# Patient Record
Sex: MALE | Race: WHITE | Employment: OTHER | URBAN - METROPOLITAN AREA
[De-identification: names, ages, dates, MRNs, and addresses within clinical notes are randomized per-mention and may not be internally consistent; named-entity substitution may affect disease eponyms.]

---

## 2020-01-01 ENCOUNTER — HOME CARE VISIT (OUTPATIENT)
Dept: HOSPICE | Facility: HOSPICE | Age: 80
End: 2020-01-01
Payer: MEDICARE

## 2020-01-01 ENCOUNTER — APPOINTMENT (OUTPATIENT)
Dept: GENERAL RADIOLOGY | Age: 80
DRG: 177 | End: 2020-01-01
Attending: HOSPITALIST
Payer: MEDICARE

## 2020-01-01 ENCOUNTER — HOSPICE ADMISSION (OUTPATIENT)
Dept: HOSPICE | Facility: HOSPICE | Age: 80
End: 2020-01-01
Payer: MEDICARE

## 2020-01-01 ENCOUNTER — APPOINTMENT (OUTPATIENT)
Dept: GENERAL RADIOLOGY | Age: 80
DRG: 177 | End: 2020-01-01
Attending: FAMILY MEDICINE
Payer: MEDICARE

## 2020-01-01 ENCOUNTER — APPOINTMENT (OUTPATIENT)
Dept: GENERAL RADIOLOGY | Age: 80
DRG: 177 | End: 2020-01-01
Attending: INTERNAL MEDICINE
Payer: MEDICARE

## 2020-01-01 ENCOUNTER — APPOINTMENT (OUTPATIENT)
Dept: NON INVASIVE DIAGNOSTICS | Age: 80
DRG: 177 | End: 2020-01-01
Attending: FAMILY MEDICINE
Payer: MEDICARE

## 2020-01-01 ENCOUNTER — APPOINTMENT (OUTPATIENT)
Dept: CT IMAGING | Age: 80
DRG: 177 | End: 2020-01-01
Attending: EMERGENCY MEDICINE
Payer: MEDICARE

## 2020-01-01 ENCOUNTER — HOME CARE VISIT (OUTPATIENT)
Dept: SCHEDULING | Facility: HOME HEALTH | Age: 80
End: 2020-01-01
Payer: MEDICARE

## 2020-01-01 ENCOUNTER — HOSPITAL ENCOUNTER (INPATIENT)
Dept: CT IMAGING | Age: 80
Discharge: HOME | DRG: 177 | End: 2020-09-30
Attending: INTERNAL MEDICINE
Payer: MEDICARE

## 2020-01-01 ENCOUNTER — HOSPITAL ENCOUNTER (INPATIENT)
Age: 80
LOS: 10 days | Discharge: HOSPICE | DRG: 177 | End: 2020-10-09
Attending: EMERGENCY MEDICINE | Admitting: FAMILY MEDICINE
Payer: MEDICARE

## 2020-01-01 ENCOUNTER — APPOINTMENT (OUTPATIENT)
Dept: GENERAL RADIOLOGY | Age: 80
DRG: 177 | End: 2020-01-01
Attending: EMERGENCY MEDICINE
Payer: MEDICARE

## 2020-01-01 VITALS
SYSTOLIC BLOOD PRESSURE: 98 MMHG | BODY MASS INDEX: 24.23 KG/M2 | TEMPERATURE: 97.9 F | HEART RATE: 83 BPM | WEIGHT: 159.9 LBS | DIASTOLIC BLOOD PRESSURE: 42 MMHG | HEIGHT: 68 IN | RESPIRATION RATE: 20 BRPM | OXYGEN SATURATION: 97 %

## 2020-01-01 VITALS
DIASTOLIC BLOOD PRESSURE: 77 MMHG | HEART RATE: 92 BPM | TEMPERATURE: 97.2 F | OXYGEN SATURATION: 98 % | SYSTOLIC BLOOD PRESSURE: 133 MMHG | RESPIRATION RATE: 20 BRPM

## 2020-01-01 VITALS
DIASTOLIC BLOOD PRESSURE: 80 MMHG | OXYGEN SATURATION: 95 % | WEIGHT: 153 LBS | RESPIRATION RATE: 20 BRPM | BODY MASS INDEX: 23.19 KG/M2 | TEMPERATURE: 97.7 F | HEART RATE: 114 BPM | HEIGHT: 68 IN | SYSTOLIC BLOOD PRESSURE: 150 MMHG

## 2020-01-01 VITALS
DIASTOLIC BLOOD PRESSURE: 87 MMHG | RESPIRATION RATE: 30 BRPM | SYSTOLIC BLOOD PRESSURE: 139 MMHG | TEMPERATURE: 99.8 F | HEART RATE: 93 BPM | OXYGEN SATURATION: 98 %

## 2020-01-01 VITALS
TEMPERATURE: 97 F | RESPIRATION RATE: 22 BRPM | SYSTOLIC BLOOD PRESSURE: 128 MMHG | DIASTOLIC BLOOD PRESSURE: 78 MMHG | OXYGEN SATURATION: 100 % | HEART RATE: 90 BPM

## 2020-01-01 DIAGNOSIS — F03.91 DEMENTIA WITH BEHAVIORAL DISTURBANCE, UNSPECIFIED DEMENTIA TYPE: ICD-10-CM

## 2020-01-01 DIAGNOSIS — J96.01 ACUTE RESPIRATORY FAILURE WITH HYPOXIA AND HYPERCAPNIA (HCC): ICD-10-CM

## 2020-01-01 DIAGNOSIS — Z20.822 SUSPECTED 2019 NOVEL CORONAVIRUS INFECTION: ICD-10-CM

## 2020-01-01 DIAGNOSIS — G93.40 ENCEPHALOPATHY ACUTE: ICD-10-CM

## 2020-01-01 DIAGNOSIS — J96.02 ACUTE RESPIRATORY FAILURE WITH HYPOXIA AND HYPERCAPNIA (HCC): ICD-10-CM

## 2020-01-01 DIAGNOSIS — Z71.89 ADVANCED CARE PLANNING/COUNSELING DISCUSSION: ICD-10-CM

## 2020-01-01 DIAGNOSIS — Z77.110 EXACERBATION OF COPD ASSOCIATED WITH VOLCANIC SMOG EXPOSURE (HCC): ICD-10-CM

## 2020-01-01 DIAGNOSIS — J44.1 EXACERBATION OF COPD ASSOCIATED WITH VOLCANIC SMOG EXPOSURE (HCC): ICD-10-CM

## 2020-01-01 DIAGNOSIS — R06.02 SOB (SHORTNESS OF BREATH): ICD-10-CM

## 2020-01-01 DIAGNOSIS — R09.02 HYPOXIA: ICD-10-CM

## 2020-01-01 DIAGNOSIS — J44.1 CHRONIC OBSTRUCTIVE PULMONARY DISEASE WITH ACUTE EXACERBATION (HCC): ICD-10-CM

## 2020-01-01 DIAGNOSIS — F10.10 ALCOHOL ABUSE: ICD-10-CM

## 2020-01-01 DIAGNOSIS — Z99.81 SUPPLEMENTAL OXYGEN DEPENDENT: ICD-10-CM

## 2020-01-01 DIAGNOSIS — F17.200 TOBACCO DEPENDENCE: ICD-10-CM

## 2020-01-01 DIAGNOSIS — R06.03 RESPIRATORY DISTRESS: Primary | ICD-10-CM

## 2020-01-01 LAB
ALBUMIN SERPL-MCNC: 3.5 G/DL (ref 3.4–5)
ALBUMIN SERPL-MCNC: 4.2 G/DL (ref 3.4–5)
ALBUMIN SERPL-MCNC: 4.9 G/DL (ref 3.4–5)
ALBUMIN SERPL-MCNC: 5 G/DL (ref 3.4–5)
ALBUMIN/GLOB SERPL: 0.9 {RATIO} (ref 0.8–1.7)
ALBUMIN/GLOB SERPL: 1.2 {RATIO} (ref 0.8–1.7)
ALBUMIN/GLOB SERPL: 1.6 {RATIO} (ref 0.8–1.7)
ALBUMIN/GLOB SERPL: 1.9 {RATIO} (ref 0.8–1.7)
ALP SERPL-CCNC: 113 U/L (ref 45–117)
ALP SERPL-CCNC: 60 U/L (ref 45–117)
ALP SERPL-CCNC: 71 U/L (ref 45–117)
ALP SERPL-CCNC: 85 U/L (ref 45–117)
ALT SERPL-CCNC: 14 U/L (ref 16–61)
ALT SERPL-CCNC: 16 U/L (ref 16–61)
ALT SERPL-CCNC: 17 U/L (ref 16–61)
ALT SERPL-CCNC: 19 U/L (ref 16–61)
AMPHET UR QL SCN: NEGATIVE
ANION GAP SERPL CALC-SCNC: 5 MMOL/L (ref 3–18)
ANION GAP SERPL CALC-SCNC: 7 MMOL/L (ref 3–18)
ANION GAP SERPL CALC-SCNC: 8 MMOL/L (ref 3–18)
ANION GAP SERPL CALC-SCNC: 9 MMOL/L (ref 3–18)
ANION GAP SERPL CALC-SCNC: 9 MMOL/L (ref 3–18)
APPEARANCE UR: CLEAR
APTT PPP: 33 SEC (ref 23–36.4)
ARTERIAL PATENCY WRIST A: ABNORMAL
ARTERIAL PATENCY WRIST A: YES
ARTERIAL PATENCY WRIST A: YES
AST SERPL-CCNC: 12 U/L (ref 10–38)
AST SERPL-CCNC: 14 U/L (ref 10–38)
AST SERPL-CCNC: 38 U/L (ref 10–38)
AST SERPL-CCNC: 44 U/L (ref 10–38)
ATRIAL RATE: 93 BPM
AV VELOCITY RATIO: 0.55
AV VTI RATIO: 0.6
BACTERIA SPEC CULT: NORMAL
BACTERIA URNS QL MICRO: 0 /HPF
BARBITURATES UR QL SCN: NEGATIVE
BASE EXCESS BLD CALC-SCNC: 1 MMOL/L
BASE EXCESS BLD CALC-SCNC: 1 MMOL/L
BASE EXCESS BLD CALC-SCNC: 6 MMOL/L
BASOPHILS # BLD: 0 K/UL (ref 0–0.1)
BASOPHILS NFR BLD: 0 % (ref 0–2)
BDY SITE: ABNORMAL
BENZODIAZ UR QL: NEGATIVE
BILIRUB SERPL-MCNC: 0.3 MG/DL (ref 0.2–1)
BILIRUB SERPL-MCNC: 0.5 MG/DL (ref 0.2–1)
BILIRUB SERPL-MCNC: 0.5 MG/DL (ref 0.2–1)
BILIRUB SERPL-MCNC: 0.8 MG/DL (ref 0.2–1)
BILIRUB UR QL: NEGATIVE
BNP SERPL-MCNC: 1571 PG/ML (ref 0–1800)
BODY TEMPERATURE: 98.1
BODY TEMPERATURE: 98.6
BUN SERPL-MCNC: 15 MG/DL (ref 7–18)
BUN SERPL-MCNC: 21 MG/DL (ref 7–18)
BUN SERPL-MCNC: 21 MG/DL (ref 7–18)
BUN SERPL-MCNC: 31 MG/DL (ref 7–18)
BUN SERPL-MCNC: 35 MG/DL (ref 7–18)
BUN SERPL-MCNC: 35 MG/DL (ref 7–18)
BUN SERPL-MCNC: 36 MG/DL (ref 7–18)
BUN SERPL-MCNC: 36 MG/DL (ref 7–18)
BUN SERPL-MCNC: 41 MG/DL (ref 7–18)
BUN/CREAT SERPL: 21 (ref 12–20)
BUN/CREAT SERPL: 25 (ref 12–20)
BUN/CREAT SERPL: 32 (ref 12–20)
BUN/CREAT SERPL: 37 (ref 12–20)
BUN/CREAT SERPL: 42 (ref 12–20)
BUN/CREAT SERPL: 42 (ref 12–20)
BUN/CREAT SERPL: 44 (ref 12–20)
BUN/CREAT SERPL: 45 (ref 12–20)
BUN/CREAT SERPL: 45 (ref 12–20)
CALCIUM SERPL-MCNC: 8.7 MG/DL (ref 8.5–10.1)
CALCIUM SERPL-MCNC: 8.8 MG/DL (ref 8.5–10.1)
CALCIUM SERPL-MCNC: 8.8 MG/DL (ref 8.5–10.1)
CALCIUM SERPL-MCNC: 8.9 MG/DL (ref 8.5–10.1)
CALCIUM SERPL-MCNC: 9.1 MG/DL (ref 8.5–10.1)
CALCIUM SERPL-MCNC: 9.1 MG/DL (ref 8.5–10.1)
CALCIUM SERPL-MCNC: 9.2 MG/DL (ref 8.5–10.1)
CALCIUM SERPL-MCNC: 9.4 MG/DL (ref 8.5–10.1)
CALCIUM SERPL-MCNC: 9.5 MG/DL (ref 8.5–10.1)
CALCULATED P AXIS, ECG09: 50 DEGREES
CALCULATED R AXIS, ECG10: -4 DEGREES
CALCULATED T AXIS, ECG11: 53 DEGREES
CANNABINOIDS UR QL SCN: NEGATIVE
CHLORIDE SERPL-SCNC: 105 MMOL/L (ref 100–111)
CHLORIDE SERPL-SCNC: 105 MMOL/L (ref 100–111)
CHLORIDE SERPL-SCNC: 107 MMOL/L (ref 100–111)
CHLORIDE SERPL-SCNC: 108 MMOL/L (ref 100–111)
CHLORIDE SERPL-SCNC: 108 MMOL/L (ref 100–111)
CHLORIDE SERPL-SCNC: 111 MMOL/L (ref 100–111)
CHLORIDE SERPL-SCNC: 116 MMOL/L (ref 100–111)
CHLORIDE SERPL-SCNC: 120 MMOL/L (ref 100–111)
CHLORIDE SERPL-SCNC: 121 MMOL/L (ref 100–111)
CK MB CFR SERPL CALC: 2.3 % (ref 0–4)
CK MB CFR SERPL CALC: 4.1 % (ref 0–4)
CK MB SERPL-MCNC: 2.6 NG/ML (ref 5–25)
CK MB SERPL-MCNC: 2.9 NG/ML (ref 5–25)
CK SERPL-CCNC: 112 U/L (ref 39–308)
CK SERPL-CCNC: 71 U/L (ref 39–308)
CO2 SERPL-SCNC: 27 MMOL/L (ref 21–32)
CO2 SERPL-SCNC: 28 MMOL/L (ref 21–32)
CO2 SERPL-SCNC: 28 MMOL/L (ref 21–32)
CO2 SERPL-SCNC: 29 MMOL/L (ref 21–32)
CO2 SERPL-SCNC: 30 MMOL/L (ref 21–32)
CO2 SERPL-SCNC: 30 MMOL/L (ref 21–32)
CO2 SERPL-SCNC: 31 MMOL/L (ref 21–32)
COCAINE UR QL SCN: NEGATIVE
COLOR UR: YELLOW
COVID-19 RAPID TEST, COVR: NOT DETECTED
CREAT SERPL-MCNC: 0.65 MG/DL (ref 0.6–1.3)
CREAT SERPL-MCNC: 0.72 MG/DL (ref 0.6–1.3)
CREAT SERPL-MCNC: 0.74 MG/DL (ref 0.6–1.3)
CREAT SERPL-MCNC: 0.78 MG/DL (ref 0.6–1.3)
CREAT SERPL-MCNC: 0.82 MG/DL (ref 0.6–1.3)
CREAT SERPL-MCNC: 0.83 MG/DL (ref 0.6–1.3)
CREAT SERPL-MCNC: 0.83 MG/DL (ref 0.6–1.3)
CREAT SERPL-MCNC: 0.91 MG/DL (ref 0.6–1.3)
CREAT SERPL-MCNC: 0.98 MG/DL (ref 0.6–1.3)
DIAGNOSIS, 93000: NORMAL
DIFFERENTIAL METHOD BLD: ABNORMAL
ECHO AO ASC DIAM: 0 CM
ECHO AV ANNULUS DIAM: 3.31 CM
ECHO AV AREA PEAK VELOCITY: 1.7 CM2
ECHO AV AREA VTI: 1.9 CM2
ECHO AV AREA/BSA PEAK VELOCITY: 0.9 CM2/M2
ECHO AV AREA/BSA VTI: 1 CM2/M2
ECHO AV MEAN GRADIENT: 3.8 MMHG
ECHO AV MEAN VELOCITY: 0.88 M/S
ECHO AV PEAK GRADIENT: 8.3 MMHG
ECHO AV PEAK VELOCITY: 143.69 CM/S
ECHO AV VTI: 22.74 CM
ECHO IVC PROX: 1.73 CM
ECHO LA AREA 4C: 13.1 CM2
ECHO LA MAJOR AXIS: 2.81 CM
ECHO LA MINOR AXIS: 1.46 CM
ECHO LA VOL 4C: 25.73 ML (ref 18–58)
ECHO LA VOLUME INDEX A4C: 13.39 ML/M2 (ref 16–28)
ECHO LV E' LATERAL VELOCITY: 9 CM/S
ECHO LV E' SEPTAL VELOCITY: 4 CM/S
ECHO LV EDV A4C: 106.7 ML
ECHO LV EDV INDEX A4C: 55.5 ML/M2
ECHO LV EDV TEICHHOLZ: 0.59 ML
ECHO LV EJECTION FRACTION A4C: 48 %
ECHO LV ESV A4C: 55.9 ML
ECHO LV ESV INDEX A4C: 29.1 ML/M2
ECHO LV ESV TEICHHOLZ: 0.31 ML
ECHO LV INTERNAL DIMENSION DIASTOLIC: 4.69 CM (ref 4.2–5.9)
ECHO LV INTERNAL DIMENSION SYSTOLIC: 3.59 CM
ECHO LV IVSD: 0.9 CM (ref 0.6–1)
ECHO LV MASS 2D: 142.2 G (ref 88–224)
ECHO LV MASS INDEX 2D: 74 G/M2 (ref 49–115)
ECHO LV POSTERIOR WALL DIASTOLIC: 0.9 CM (ref 0.6–1)
ECHO LV POSTERIOR WALL SYSTOLIC: 0 CM
ECHO LVOT CARDIAC OUTPUT: 4.2 L/MIN
ECHO LVOT DIAM: 2.01 CM
ECHO LVOT PEAK GRADIENT: 2.5 MMHG
ECHO LVOT PEAK VELOCITY: 79.04 CM/S
ECHO LVOT SV: 42.4 ML
ECHO LVOT VTI: 13.43 CM
ECHO MV A VELOCITY: 125 CM/S
ECHO MV E VELOCITY: 78 CM/S
ECHO MV E/A RATIO: 0.62
ECHO MV E/E' LATERAL: 8.67
ECHO MV E/E' RATIO (AVERAGED): 14.08
ECHO MV E/E' SEPTAL: 19.5
ECHO RV TAPSE: 1.87 CM (ref 1.5–2)
ECHO RVOT DIAMETER: 0 CM
EOSINOPHIL # BLD: 0 K/UL (ref 0–0.4)
EOSINOPHIL NFR BLD: 0 % (ref 0–5)
EPITH CASTS URNS QL MICRO: 0 /LPF (ref 0–5)
ERYTHROCYTE [DISTWIDTH] IN BLOOD BY AUTOMATED COUNT: 14.3 % (ref 11.6–14.5)
ERYTHROCYTE [DISTWIDTH] IN BLOOD BY AUTOMATED COUNT: 14.4 % (ref 11.6–14.5)
ERYTHROCYTE [DISTWIDTH] IN BLOOD BY AUTOMATED COUNT: 14.4 % (ref 11.6–14.5)
ERYTHROCYTE [DISTWIDTH] IN BLOOD BY AUTOMATED COUNT: 14.5 % (ref 11.6–14.5)
ERYTHROCYTE [DISTWIDTH] IN BLOOD BY AUTOMATED COUNT: 14.6 % (ref 11.6–14.5)
EST. AVERAGE GLUCOSE BLD GHB EST-MCNC: 151 MG/DL
GAS FLOW.O2 O2 DELIVERY SYS: ABNORMAL L/MIN
GAS FLOW.O2 SETTING OXYMISER: 14 L/M
GAS FLOW.O2 SETTING OXYMISER: 2 L/M
GAS FLOW.O2 SETTING OXYMISER: 5 L/M
GLOBULIN SER CALC-MCNC: 2.7 G/DL (ref 2–4)
GLOBULIN SER CALC-MCNC: 3 G/DL (ref 2–4)
GLOBULIN SER CALC-MCNC: 3.4 G/DL (ref 2–4)
GLOBULIN SER CALC-MCNC: 4.1 G/DL (ref 2–4)
GLUCOSE BLD STRIP.AUTO-MCNC: 101 MG/DL (ref 70–110)
GLUCOSE BLD STRIP.AUTO-MCNC: 104 MG/DL (ref 70–110)
GLUCOSE BLD STRIP.AUTO-MCNC: 112 MG/DL (ref 70–110)
GLUCOSE BLD STRIP.AUTO-MCNC: 112 MG/DL (ref 70–110)
GLUCOSE BLD STRIP.AUTO-MCNC: 113 MG/DL (ref 70–110)
GLUCOSE BLD STRIP.AUTO-MCNC: 113 MG/DL (ref 70–110)
GLUCOSE BLD STRIP.AUTO-MCNC: 114 MG/DL (ref 70–110)
GLUCOSE BLD STRIP.AUTO-MCNC: 114 MG/DL (ref 70–110)
GLUCOSE BLD STRIP.AUTO-MCNC: 121 MG/DL (ref 70–110)
GLUCOSE BLD STRIP.AUTO-MCNC: 122 MG/DL (ref 70–110)
GLUCOSE BLD STRIP.AUTO-MCNC: 125 MG/DL (ref 70–110)
GLUCOSE BLD STRIP.AUTO-MCNC: 127 MG/DL (ref 70–110)
GLUCOSE BLD STRIP.AUTO-MCNC: 128 MG/DL (ref 70–110)
GLUCOSE BLD STRIP.AUTO-MCNC: 135 MG/DL (ref 70–110)
GLUCOSE BLD STRIP.AUTO-MCNC: 138 MG/DL (ref 70–110)
GLUCOSE BLD STRIP.AUTO-MCNC: 149 MG/DL (ref 70–110)
GLUCOSE BLD STRIP.AUTO-MCNC: 153 MG/DL (ref 70–110)
GLUCOSE BLD STRIP.AUTO-MCNC: 159 MG/DL (ref 70–110)
GLUCOSE BLD STRIP.AUTO-MCNC: 160 MG/DL (ref 70–110)
GLUCOSE BLD STRIP.AUTO-MCNC: 162 MG/DL (ref 70–110)
GLUCOSE BLD STRIP.AUTO-MCNC: 166 MG/DL (ref 70–110)
GLUCOSE BLD STRIP.AUTO-MCNC: 169 MG/DL (ref 70–110)
GLUCOSE BLD STRIP.AUTO-MCNC: 178 MG/DL (ref 70–110)
GLUCOSE BLD STRIP.AUTO-MCNC: 179 MG/DL (ref 70–110)
GLUCOSE BLD STRIP.AUTO-MCNC: 207 MG/DL (ref 70–110)
GLUCOSE BLD STRIP.AUTO-MCNC: 307 MG/DL (ref 70–110)
GLUCOSE BLD STRIP.AUTO-MCNC: 82 MG/DL (ref 70–110)
GLUCOSE BLD STRIP.AUTO-MCNC: 90 MG/DL (ref 70–110)
GLUCOSE SERPL-MCNC: 119 MG/DL (ref 74–99)
GLUCOSE SERPL-MCNC: 129 MG/DL (ref 74–99)
GLUCOSE SERPL-MCNC: 139 MG/DL (ref 74–99)
GLUCOSE SERPL-MCNC: 160 MG/DL (ref 74–99)
GLUCOSE SERPL-MCNC: 163 MG/DL (ref 74–99)
GLUCOSE SERPL-MCNC: 189 MG/DL (ref 74–99)
GLUCOSE SERPL-MCNC: 94 MG/DL (ref 74–99)
GLUCOSE UR STRIP.AUTO-MCNC: NEGATIVE MG/DL
HBA1C MFR BLD: 6.9 % (ref 4.2–5.6)
HCO3 BLD-SCNC: 25.8 MMOL/L (ref 22–26)
HCO3 BLD-SCNC: 25.8 MMOL/L (ref 22–26)
HCO3 BLD-SCNC: 31.1 MMOL/L (ref 22–26)
HCT VFR BLD AUTO: 34.3 % (ref 36–48)
HCT VFR BLD AUTO: 37.3 % (ref 36–48)
HCT VFR BLD AUTO: 38.9 % (ref 36–48)
HCT VFR BLD AUTO: 39.9 % (ref 36–48)
HCT VFR BLD AUTO: 40.3 % (ref 36–48)
HCT VFR BLD AUTO: 41.1 % (ref 36–48)
HCT VFR BLD AUTO: 43 % (ref 36–48)
HCT VFR BLD AUTO: 43.9 % (ref 36–48)
HCT VFR BLD AUTO: 45.4 % (ref 36–48)
HDSCOM,HDSCOM: NORMAL
HGB BLD-MCNC: 10.8 G/DL (ref 13–16)
HGB BLD-MCNC: 11.9 G/DL (ref 13–16)
HGB BLD-MCNC: 12.3 G/DL (ref 13–16)
HGB BLD-MCNC: 12.5 G/DL (ref 13–16)
HGB BLD-MCNC: 13 G/DL (ref 13–16)
HGB BLD-MCNC: 13.2 G/DL (ref 13–16)
HGB BLD-MCNC: 13.3 G/DL (ref 13–16)
HGB BLD-MCNC: 13.7 G/DL (ref 13–16)
HGB BLD-MCNC: 14.1 G/DL (ref 13–16)
HGB UR QL STRIP: ABNORMAL
INR PPP: 1 (ref 0.8–1.2)
KETONES UR QL STRIP.AUTO: NEGATIVE MG/DL
LACTATE SERPL-SCNC: 0.8 MMOL/L (ref 0.4–2)
LEUKOCYTE ESTERASE UR QL STRIP.AUTO: NEGATIVE
LVFS 2D: 23.49 %
LVOT MG: 0.95 MMHG
LVOT MV: 0.42 CM/S
LVSV (MOD SINGLE 4C): 26.18 ML
LVSV (TEICH): 24.68 ML
LYMPHOCYTES # BLD: 1 K/UL (ref 0.9–3.6)
LYMPHOCYTES # BLD: 1.1 K/UL (ref 0.9–3.6)
LYMPHOCYTES # BLD: 1.2 K/UL (ref 0.9–3.6)
LYMPHOCYTES # BLD: 1.3 K/UL (ref 0.9–3.6)
LYMPHOCYTES # BLD: 1.4 K/UL (ref 0.9–3.6)
LYMPHOCYTES # BLD: 1.6 K/UL (ref 0.9–3.6)
LYMPHOCYTES NFR BLD: 10 % (ref 21–52)
LYMPHOCYTES NFR BLD: 10 % (ref 21–52)
LYMPHOCYTES NFR BLD: 11 % (ref 21–52)
LYMPHOCYTES NFR BLD: 6 % (ref 21–52)
LYMPHOCYTES NFR BLD: 8 % (ref 21–52)
LYMPHOCYTES NFR BLD: 8 % (ref 21–52)
MCH RBC QN AUTO: 29.8 PG (ref 24–34)
MCH RBC QN AUTO: 30 PG (ref 24–34)
MCH RBC QN AUTO: 30.1 PG (ref 24–34)
MCH RBC QN AUTO: 30.2 PG (ref 24–34)
MCH RBC QN AUTO: 30.2 PG (ref 24–34)
MCH RBC QN AUTO: 30.3 PG (ref 24–34)
MCH RBC QN AUTO: 30.6 PG (ref 24–34)
MCH RBC QN AUTO: 31 PG (ref 24–34)
MCH RBC QN AUTO: 31.4 PG (ref 24–34)
MCHC RBC AUTO-ENTMCNC: 30.4 G/DL (ref 31–37)
MCHC RBC AUTO-ENTMCNC: 30.7 G/DL (ref 31–37)
MCHC RBC AUTO-ENTMCNC: 31.1 G/DL (ref 31–37)
MCHC RBC AUTO-ENTMCNC: 31.2 G/DL (ref 31–37)
MCHC RBC AUTO-ENTMCNC: 31.5 G/DL (ref 31–37)
MCHC RBC AUTO-ENTMCNC: 31.6 G/DL (ref 31–37)
MCHC RBC AUTO-ENTMCNC: 31.9 G/DL (ref 31–37)
MCHC RBC AUTO-ENTMCNC: 32.3 G/DL (ref 31–37)
MCHC RBC AUTO-ENTMCNC: 33.3 G/DL (ref 31–37)
MCV RBC AUTO: 94.1 FL (ref 74–97)
MCV RBC AUTO: 95.1 FL (ref 74–97)
MCV RBC AUTO: 95.8 FL (ref 74–97)
MCV RBC AUTO: 95.9 FL (ref 74–97)
MCV RBC AUTO: 96.2 FL (ref 74–97)
MCV RBC AUTO: 96.7 FL (ref 74–97)
MCV RBC AUTO: 97.6 FL (ref 74–97)
MCV RBC AUTO: 97.7 FL (ref 74–97)
MCV RBC AUTO: 97.9 FL (ref 74–97)
METHADONE UR QL: NEGATIVE
MONOCYTES # BLD: 0.6 K/UL (ref 0.05–1.2)
MONOCYTES # BLD: 0.7 K/UL (ref 0.05–1.2)
MONOCYTES # BLD: 0.7 K/UL (ref 0.05–1.2)
MONOCYTES # BLD: 0.8 K/UL (ref 0.05–1.2)
MONOCYTES # BLD: 0.9 K/UL (ref 0.05–1.2)
MONOCYTES # BLD: 1.3 K/UL (ref 0.05–1.2)
MONOCYTES NFR BLD: 4 % (ref 3–10)
MONOCYTES NFR BLD: 5 % (ref 3–10)
MONOCYTES NFR BLD: 5 % (ref 3–10)
MONOCYTES NFR BLD: 7 % (ref 3–10)
MONOCYTES NFR BLD: 7 % (ref 3–10)
MONOCYTES NFR BLD: 8 % (ref 3–10)
NEUTS SEG # BLD: 11.2 K/UL (ref 1.8–8)
NEUTS SEG # BLD: 11.6 K/UL (ref 1.8–8)
NEUTS SEG # BLD: 12.9 K/UL (ref 1.8–8)
NEUTS SEG # BLD: 13.1 K/UL (ref 1.8–8)
NEUTS SEG # BLD: 13.5 K/UL (ref 1.8–8)
NEUTS SEG # BLD: 9.8 K/UL (ref 1.8–8)
NEUTS SEG NFR BLD: 82 % (ref 40–73)
NEUTS SEG NFR BLD: 82 % (ref 40–73)
NEUTS SEG NFR BLD: 83 % (ref 40–73)
NEUTS SEG NFR BLD: 87 % (ref 40–73)
NEUTS SEG NFR BLD: 88 % (ref 40–73)
NEUTS SEG NFR BLD: 89 % (ref 40–73)
NITRITE UR QL STRIP.AUTO: NEGATIVE
O2/TOTAL GAS SETTING VFR VENT: 0.28 %
O2/TOTAL GAS SETTING VFR VENT: 0.4 %
O2/TOTAL GAS SETTING VFR VENT: 100 %
OPIATES UR QL: NEGATIVE
P-R INTERVAL, ECG05: 144 MS
PCO2 BLD: 41.1 MMHG (ref 35–45)
PCO2 BLD: 43.4 MMHG (ref 35–45)
PCO2 BLD: 54 MMHG (ref 35–45)
PCP UR QL: NEGATIVE
PH BLD: 7.37 [PH] (ref 7.35–7.45)
PH BLD: 7.38 [PH] (ref 7.35–7.45)
PH BLD: 7.4 [PH] (ref 7.35–7.45)
PH UR STRIP: 7 [PH] (ref 5–8)
PLATELET # BLD AUTO: 266 K/UL (ref 135–420)
PLATELET # BLD AUTO: 266 K/UL (ref 135–420)
PLATELET # BLD AUTO: 273 K/UL (ref 135–420)
PLATELET # BLD AUTO: 293 K/UL (ref 135–420)
PLATELET # BLD AUTO: 316 K/UL (ref 135–420)
PLATELET # BLD AUTO: 319 K/UL (ref 135–420)
PLATELET # BLD AUTO: 334 K/UL (ref 135–420)
PLATELET # BLD AUTO: 356 K/UL (ref 135–420)
PLATELET # BLD AUTO: 374 K/UL (ref 135–420)
PMV BLD AUTO: 10 FL (ref 9.2–11.8)
PMV BLD AUTO: 10.4 FL (ref 9.2–11.8)
PMV BLD AUTO: 10.4 FL (ref 9.2–11.8)
PMV BLD AUTO: 8.8 FL (ref 9.2–11.8)
PMV BLD AUTO: 9 FL (ref 9.2–11.8)
PMV BLD AUTO: 9 FL (ref 9.2–11.8)
PMV BLD AUTO: 9.4 FL (ref 9.2–11.8)
PMV BLD AUTO: 9.6 FL (ref 9.2–11.8)
PMV BLD AUTO: 9.7 FL (ref 9.2–11.8)
PO2 BLD: 193 MMHG (ref 80–100)
PO2 BLD: 62 MMHG (ref 80–100)
PO2 BLD: 74 MMHG (ref 80–100)
POTASSIUM SERPL-SCNC: 3.3 MMOL/L (ref 3.5–5.5)
POTASSIUM SERPL-SCNC: 3.4 MMOL/L (ref 3.5–5.5)
POTASSIUM SERPL-SCNC: 3.5 MMOL/L (ref 3.5–5.5)
POTASSIUM SERPL-SCNC: 3.7 MMOL/L (ref 3.5–5.5)
POTASSIUM SERPL-SCNC: 3.7 MMOL/L (ref 3.5–5.5)
POTASSIUM SERPL-SCNC: 3.8 MMOL/L (ref 3.5–5.5)
POTASSIUM SERPL-SCNC: 3.8 MMOL/L (ref 3.5–5.5)
POTASSIUM SERPL-SCNC: 3.9 MMOL/L (ref 3.5–5.5)
POTASSIUM SERPL-SCNC: 4 MMOL/L (ref 3.5–5.5)
PROT SERPL-MCNC: 7.6 G/DL (ref 6.4–8.2)
PROT SERPL-MCNC: 7.6 G/DL (ref 6.4–8.2)
PROT SERPL-MCNC: 7.7 G/DL (ref 6.4–8.2)
PROT SERPL-MCNC: 7.9 G/DL (ref 6.4–8.2)
PROT UR STRIP-MCNC: NEGATIVE MG/DL
PROTHROMBIN TIME: 13.5 SEC (ref 11.5–15.2)
Q-T INTERVAL, ECG07: 368 MS
QRS DURATION, ECG06: 92 MS
QTC CALCULATION (BEZET), ECG08: 457 MS
RBC # BLD AUTO: 3.58 M/UL (ref 4.7–5.5)
RBC # BLD AUTO: 3.89 M/UL (ref 4.7–5.5)
RBC # BLD AUTO: 4.09 M/UL (ref 4.7–5.5)
RBC # BLD AUTO: 4.19 M/UL (ref 4.7–5.5)
RBC # BLD AUTO: 4.2 M/UL (ref 4.7–5.5)
RBC # BLD AUTO: 4.24 M/UL (ref 4.7–5.5)
RBC # BLD AUTO: 4.4 M/UL (ref 4.7–5.5)
RBC # BLD AUTO: 4.54 M/UL (ref 4.7–5.5)
RBC # BLD AUTO: 4.65 M/UL (ref 4.7–5.5)
RBC #/AREA URNS HPF: ABNORMAL /HPF (ref 0–5)
SAO2 % BLD: 100 % (ref 92–97)
SAO2 % BLD: 91 % (ref 92–97)
SAO2 % BLD: 95 % (ref 92–97)
SARS-COV-2, COV2NT: NOT DETECTED
SERVICE CMNT-IMP: ABNORMAL
SERVICE CMNT-IMP: NORMAL
SODIUM SERPL-SCNC: 141 MMOL/L (ref 136–145)
SODIUM SERPL-SCNC: 143 MMOL/L (ref 136–145)
SODIUM SERPL-SCNC: 144 MMOL/L (ref 136–145)
SODIUM SERPL-SCNC: 145 MMOL/L (ref 136–145)
SODIUM SERPL-SCNC: 146 MMOL/L (ref 136–145)
SODIUM SERPL-SCNC: 149 MMOL/L (ref 136–145)
SODIUM SERPL-SCNC: 152 MMOL/L (ref 136–145)
SODIUM SERPL-SCNC: 153 MMOL/L (ref 136–145)
SODIUM SERPL-SCNC: 153 MMOL/L (ref 136–145)
SOURCE, COVRS: NORMAL
SOURCE, COVRS: NORMAL
SP GR UR REFRACTOMETRY: 1.01 (ref 1–1.03)
SPECIMEN TYPE, XMCV1T: NORMAL
SPECIMEN TYPE, XMCV1T: NORMAL
SPECIMEN TYPE: ABNORMAL
TOTAL RESP. RATE, ITRR: 26
TOTAL RESP. RATE, ITRR: 28
TOTAL RESP. RATE, ITRR: 32
TROPONIN I SERPL-MCNC: 0.02 NG/ML (ref 0–0.04)
TROPONIN I SERPL-MCNC: 0.02 NG/ML (ref 0–0.04)
UROBILINOGEN UR QL STRIP.AUTO: 0.2 EU/DL (ref 0.2–1)
VENTRICULAR RATE, ECG03: 93 BPM
WBC # BLD AUTO: 12 K/UL (ref 4.6–13.2)
WBC # BLD AUTO: 13.3 K/UL (ref 4.6–13.2)
WBC # BLD AUTO: 13.5 K/UL (ref 4.6–13.2)
WBC # BLD AUTO: 14.1 K/UL (ref 4.6–13.2)
WBC # BLD AUTO: 15 K/UL (ref 4.6–13.2)
WBC # BLD AUTO: 15.2 K/UL (ref 4.6–13.2)
WBC # BLD AUTO: 15.7 K/UL (ref 4.6–13.2)
WBC # BLD AUTO: 16.1 K/UL (ref 4.6–13.2)
WBC # BLD AUTO: 18.1 K/UL (ref 4.6–13.2)
WBC URNS QL MICRO: ABNORMAL /HPF (ref 0–5)

## 2020-01-01 PROCEDURE — 74011250636 HC RX REV CODE- 250/636: Performed by: HOSPITALIST

## 2020-01-01 PROCEDURE — 77010033711 HC HIGH FLOW OXYGEN

## 2020-01-01 PROCEDURE — 99221 1ST HOSP IP/OBS SF/LOW 40: CPT | Performed by: NURSE PRACTITIONER

## 2020-01-01 PROCEDURE — 74011250637 HC RX REV CODE- 250/637: Performed by: FAMILY MEDICINE

## 2020-01-01 PROCEDURE — 74011250636 HC RX REV CODE- 250/636: Performed by: FAMILY MEDICINE

## 2020-01-01 PROCEDURE — 74011000250 HC RX REV CODE- 250: Performed by: FAMILY MEDICINE

## 2020-01-01 PROCEDURE — 80048 BASIC METABOLIC PNL TOTAL CA: CPT

## 2020-01-01 PROCEDURE — 74011000636 HC RX REV CODE- 636: Performed by: EMERGENCY MEDICINE

## 2020-01-01 PROCEDURE — 94640 AIRWAY INHALATION TREATMENT: CPT

## 2020-01-01 PROCEDURE — 82962 GLUCOSE BLOOD TEST: CPT

## 2020-01-01 PROCEDURE — 74011000258 HC RX REV CODE- 258: Performed by: HOSPITALIST

## 2020-01-01 PROCEDURE — 94760 N-INVAS EAR/PLS OXIMETRY 1: CPT

## 2020-01-01 PROCEDURE — 65660000000 HC RM CCU STEPDOWN

## 2020-01-01 PROCEDURE — 85610 PROTHROMBIN TIME: CPT

## 2020-01-01 PROCEDURE — 83880 ASSAY OF NATRIURETIC PEPTIDE: CPT

## 2020-01-01 PROCEDURE — 74011000250 HC RX REV CODE- 250: Performed by: INTERNAL MEDICINE

## 2020-01-01 PROCEDURE — 97161 PT EVAL LOW COMPLEX 20 MIN: CPT

## 2020-01-01 PROCEDURE — 85027 COMPLETE CBC AUTOMATED: CPT

## 2020-01-01 PROCEDURE — 80307 DRUG TEST PRSMV CHEM ANLYZR: CPT

## 2020-01-01 PROCEDURE — 92526 ORAL FUNCTION THERAPY: CPT

## 2020-01-01 PROCEDURE — 83036 HEMOGLOBIN GLYCOSYLATED A1C: CPT

## 2020-01-01 PROCEDURE — 36600 WITHDRAWAL OF ARTERIAL BLOOD: CPT

## 2020-01-01 PROCEDURE — 74011250637 HC RX REV CODE- 250/637: Performed by: NURSE PRACTITIONER

## 2020-01-01 PROCEDURE — 85730 THROMBOPLASTIN TIME PARTIAL: CPT

## 2020-01-01 PROCEDURE — 82803 BLOOD GASES ANY COMBINATION: CPT

## 2020-01-01 PROCEDURE — T4541 LARGE DISPOSABLE UNDERPAD: HCPCS

## 2020-01-01 PROCEDURE — 82550 ASSAY OF CK (CPK): CPT

## 2020-01-01 PROCEDURE — G0155 HHCP-SVS OF CSW,EA 15 MIN: HCPCS

## 2020-01-01 PROCEDURE — 74011250636 HC RX REV CODE- 250/636: Performed by: INTERNAL MEDICINE

## 2020-01-01 PROCEDURE — 99233 SBSQ HOSP IP/OBS HIGH 50: CPT | Performed by: NURSE PRACTITIONER

## 2020-01-01 PROCEDURE — 77010033678 HC OXYGEN DAILY

## 2020-01-01 PROCEDURE — 74011250636 HC RX REV CODE- 250/636

## 2020-01-01 PROCEDURE — 93005 ELECTROCARDIOGRAM TRACING: CPT

## 2020-01-01 PROCEDURE — 74011636637 HC RX REV CODE- 636/637: Performed by: HOSPITALIST

## 2020-01-01 PROCEDURE — C9113 INJ PANTOPRAZOLE SODIUM, VIA: HCPCS | Performed by: INTERNAL MEDICINE

## 2020-01-01 PROCEDURE — 96375 TX/PRO/DX INJ NEW DRUG ADDON: CPT

## 2020-01-01 PROCEDURE — 71275 CT ANGIOGRAPHY CHEST: CPT

## 2020-01-01 PROCEDURE — 0651 HSPC ROUTINE HOME CARE

## 2020-01-01 PROCEDURE — HOSPICE MEDICATION HC HH HOSPICE MEDICATION

## 2020-01-01 PROCEDURE — 99232 SBSQ HOSP IP/OBS MODERATE 35: CPT | Performed by: NURSE PRACTITIONER

## 2020-01-01 PROCEDURE — C8929 TTE W OR WO FOL WCON,DOPPLER: HCPCS

## 2020-01-01 PROCEDURE — 81001 URINALYSIS AUTO W/SCOPE: CPT

## 2020-01-01 PROCEDURE — 36415 COLL VENOUS BLD VENIPUNCTURE: CPT

## 2020-01-01 PROCEDURE — 70450 CT HEAD/BRAIN W/O DYE: CPT

## 2020-01-01 PROCEDURE — 97167 OT EVAL HIGH COMPLEX 60 MIN: CPT

## 2020-01-01 PROCEDURE — 80053 COMPREHEN METABOLIC PANEL: CPT

## 2020-01-01 PROCEDURE — 87635 SARS-COV-2 COVID-19 AMP PRB: CPT

## 2020-01-01 PROCEDURE — G0299 HHS/HOSPICE OF RN EA 15 MIN: HCPCS

## 2020-01-01 PROCEDURE — 99285 EMERGENCY DEPT VISIT HI MDM: CPT

## 2020-01-01 PROCEDURE — 97116 GAIT TRAINING THERAPY: CPT

## 2020-01-01 PROCEDURE — 65270000029 HC RM PRIVATE

## 2020-01-01 PROCEDURE — 76450000000

## 2020-01-01 PROCEDURE — 85025 COMPLETE CBC W/AUTO DIFF WBC: CPT

## 2020-01-01 PROCEDURE — P9047 ALBUMIN (HUMAN), 25%, 50ML: HCPCS | Performed by: FAMILY MEDICINE

## 2020-01-01 PROCEDURE — 74011000250 HC RX REV CODE- 250: Performed by: EMERGENCY MEDICINE

## 2020-01-01 PROCEDURE — A9270 NON-COVERED ITEM OR SERVICE: HCPCS

## 2020-01-01 PROCEDURE — 83605 ASSAY OF LACTIC ACID: CPT

## 2020-01-01 PROCEDURE — 97530 THERAPEUTIC ACTIVITIES: CPT

## 2020-01-01 PROCEDURE — 71045 X-RAY EXAM CHEST 1 VIEW: CPT

## 2020-01-01 PROCEDURE — 74011636637 HC RX REV CODE- 636/637: Performed by: INTERNAL MEDICINE

## 2020-01-01 PROCEDURE — 77030005513 HC CATH URETH FOL11 MDII -B

## 2020-01-01 PROCEDURE — 77030013140 HC MSK NEB VYRM -A

## 2020-01-01 PROCEDURE — 96365 THER/PROPH/DIAG IV INF INIT: CPT

## 2020-01-01 PROCEDURE — 92610 EVALUATE SWALLOWING FUNCTION: CPT

## 2020-01-01 PROCEDURE — 3336500001 HSPC ELECTION

## 2020-01-01 PROCEDURE — 3331090004 HSPC SERVICE INTENSITY ADD-ON

## 2020-01-01 PROCEDURE — 74011250636 HC RX REV CODE- 250/636: Performed by: EMERGENCY MEDICINE

## 2020-01-01 PROCEDURE — G0156 HHCP-SVS OF AIDE,EA 15 MIN: HCPCS

## 2020-01-01 PROCEDURE — 97535 SELF CARE MNGMENT TRAINING: CPT

## 2020-01-01 PROCEDURE — A9286 ANY HYGIENIC ITEM, DEVICE: HCPCS

## 2020-01-01 PROCEDURE — 94762 N-INVAS EAR/PLS OXIMTRY CONT: CPT

## 2020-01-01 PROCEDURE — 74011250637 HC RX REV CODE- 250/637: Performed by: INTERNAL MEDICINE

## 2020-01-01 PROCEDURE — 65610000006 HC RM INTENSIVE CARE

## 2020-01-01 PROCEDURE — 77030040831 HC BAG URINE DRNG MDII -A

## 2020-01-01 PROCEDURE — 87086 URINE CULTURE/COLONY COUNT: CPT

## 2020-01-01 RX ORDER — PANTOPRAZOLE SODIUM 40 MG/1
40 TABLET, DELAYED RELEASE ORAL DAILY
Status: DISCONTINUED | OUTPATIENT
Start: 2020-01-01 | End: 2020-01-01 | Stop reason: HOSPADM

## 2020-01-01 RX ORDER — SODIUM CHLORIDE 450 MG/100ML
50 INJECTION, SOLUTION INTRAVENOUS CONTINUOUS
Status: DISCONTINUED | OUTPATIENT
Start: 2020-01-01 | End: 2020-01-01

## 2020-01-01 RX ORDER — POTASSIUM CHLORIDE 750 MG/1
10 TABLET, EXTENDED RELEASE ORAL DAILY
COMMUNITY
End: 2020-01-01

## 2020-01-01 RX ORDER — LORAZEPAM 2 MG/ML
1 INJECTION INTRAMUSCULAR
Status: DISCONTINUED | OUTPATIENT
Start: 2020-01-01 | End: 2020-01-01

## 2020-01-01 RX ORDER — HALOPERIDOL 5 MG/ML
2 INJECTION INTRAMUSCULAR ONCE
Status: COMPLETED | OUTPATIENT
Start: 2020-01-01 | End: 2020-01-01

## 2020-01-01 RX ORDER — POTASSIUM CHLORIDE 7.45 MG/ML
10 INJECTION INTRAVENOUS
Status: DISPENSED | OUTPATIENT
Start: 2020-01-01 | End: 2020-01-01

## 2020-01-01 RX ORDER — IPRATROPIUM BROMIDE AND ALBUTEROL SULFATE 2.5; .5 MG/3ML; MG/3ML
3 SOLUTION RESPIRATORY (INHALATION)
Status: COMPLETED | OUTPATIENT
Start: 2020-01-01 | End: 2020-01-01

## 2020-01-01 RX ORDER — FUROSEMIDE 40 MG/1
40 TABLET ORAL DAILY
COMMUNITY
End: 2020-01-01

## 2020-01-01 RX ORDER — LORAZEPAM 2 MG/ML
0.5 CONCENTRATE ORAL
Status: DISCONTINUED | OUTPATIENT
Start: 2020-01-01 | End: 2020-01-01 | Stop reason: HOSPADM

## 2020-01-01 RX ORDER — ACETAMINOPHEN 325 MG/1
650 TABLET ORAL
Status: DISCONTINUED | OUTPATIENT
Start: 2020-01-01 | End: 2020-01-01 | Stop reason: HOSPADM

## 2020-01-01 RX ORDER — LORAZEPAM 1 MG/1
2 TABLET ORAL
Status: DISCONTINUED | OUTPATIENT
Start: 2020-01-01 | End: 2020-01-01

## 2020-01-01 RX ORDER — PHENYTOIN SODIUM 100 MG/1
100 CAPSULE, EXTENDED RELEASE ORAL 2 TIMES DAILY
Status: DISCONTINUED | OUTPATIENT
Start: 2020-01-01 | End: 2020-01-01 | Stop reason: ALTCHOICE

## 2020-01-01 RX ORDER — MAGNESIUM SULFATE HEPTAHYDRATE 40 MG/ML
2 INJECTION, SOLUTION INTRAVENOUS ONCE
Status: COMPLETED | OUTPATIENT
Start: 2020-01-01 | End: 2020-01-01

## 2020-01-01 RX ORDER — MORPHINE SULFATE 100 MG/5ML
5 SOLUTION ORAL
Status: DISCONTINUED | OUTPATIENT
Start: 2020-01-01 | End: 2020-01-01 | Stop reason: HOSPADM

## 2020-01-01 RX ORDER — ALBUTEROL SULFATE 90 UG/1
2 AEROSOL, METERED RESPIRATORY (INHALATION)
Status: DISCONTINUED | OUTPATIENT
Start: 2020-01-01 | End: 2020-01-01 | Stop reason: CLARIF

## 2020-01-01 RX ORDER — ALBUMIN HUMAN 250 G/1000ML
25 SOLUTION INTRAVENOUS EVERY 6 HOURS
Status: DISCONTINUED | OUTPATIENT
Start: 2020-01-01 | End: 2020-01-01

## 2020-01-01 RX ORDER — FUROSEMIDE 10 MG/ML
20 INJECTION INTRAMUSCULAR; INTRAVENOUS 2 TIMES DAILY
Status: COMPLETED | OUTPATIENT
Start: 2020-01-01 | End: 2020-01-01

## 2020-01-01 RX ORDER — DOXYCYCLINE 100 MG/1
100 TABLET ORAL 2 TIMES DAILY
COMMUNITY
End: 2020-01-01

## 2020-01-01 RX ORDER — SODIUM CHLORIDE 450 MG/100ML
100 INJECTION, SOLUTION INTRAVENOUS CONTINUOUS
Status: DISCONTINUED | OUTPATIENT
Start: 2020-01-01 | End: 2020-01-01

## 2020-01-01 RX ORDER — ALBUTEROL SULFATE 0.83 MG/ML
2.5 SOLUTION RESPIRATORY (INHALATION)
Status: DISCONTINUED | OUTPATIENT
Start: 2020-01-01 | End: 2020-01-01 | Stop reason: HOSPADM

## 2020-01-01 RX ORDER — LORAZEPAM 2 MG/ML
2 INJECTION INTRAMUSCULAR
Status: DISCONTINUED | OUTPATIENT
Start: 2020-01-01 | End: 2020-01-01

## 2020-01-01 RX ORDER — QUETIAPINE FUMARATE 25 MG/1
25 TABLET, FILM COATED ORAL
Status: DISCONTINUED | OUTPATIENT
Start: 2020-01-01 | End: 2020-01-01 | Stop reason: HOSPADM

## 2020-01-01 RX ORDER — POLYETHYLENE GLYCOL 3350 17 G/17G
17 POWDER, FOR SOLUTION ORAL DAILY PRN
Status: DISCONTINUED | OUTPATIENT
Start: 2020-01-01 | End: 2020-01-01 | Stop reason: HOSPADM

## 2020-01-01 RX ORDER — IPRATROPIUM BROMIDE 0.5 MG/2.5ML
0.5 SOLUTION RESPIRATORY (INHALATION)
Status: DISCONTINUED | OUTPATIENT
Start: 2020-01-01 | End: 2020-01-01 | Stop reason: HOSPADM

## 2020-01-01 RX ORDER — SODIUM CHLORIDE 0.9 % (FLUSH) 0.9 %
5-40 SYRINGE (ML) INJECTION EVERY 8 HOURS
Status: DISCONTINUED | OUTPATIENT
Start: 2020-01-01 | End: 2020-01-01

## 2020-01-01 RX ORDER — ONDANSETRON 2 MG/ML
4 INJECTION INTRAMUSCULAR; INTRAVENOUS
Status: DISCONTINUED | OUTPATIENT
Start: 2020-01-01 | End: 2020-01-01 | Stop reason: HOSPADM

## 2020-01-01 RX ORDER — ACETAMINOPHEN 650 MG/1
650 SUPPOSITORY RECTAL
Status: DISCONTINUED | OUTPATIENT
Start: 2020-01-01 | End: 2020-01-01 | Stop reason: HOSPADM

## 2020-01-01 RX ORDER — PHENYTOIN SODIUM 100 MG/1
100 CAPSULE, EXTENDED RELEASE ORAL 2 TIMES DAILY
COMMUNITY
Start: 2020-01-01

## 2020-01-01 RX ORDER — IBUPROFEN 200 MG
1 TABLET ORAL DAILY
Status: DISCONTINUED | OUTPATIENT
Start: 2020-01-01 | End: 2020-01-01 | Stop reason: HOSPADM

## 2020-01-01 RX ORDER — FUROSEMIDE 10 MG/ML
40 INJECTION INTRAMUSCULAR; INTRAVENOUS ONCE
Status: COMPLETED | OUTPATIENT
Start: 2020-01-01 | End: 2020-01-01

## 2020-01-01 RX ORDER — LORAZEPAM 1 MG/1
1 TABLET ORAL
Status: DISCONTINUED | OUTPATIENT
Start: 2020-01-01 | End: 2020-01-01

## 2020-01-01 RX ORDER — ENOXAPARIN SODIUM 100 MG/ML
40 INJECTION SUBCUTANEOUS DAILY
Status: DISCONTINUED | OUTPATIENT
Start: 2020-01-01 | End: 2020-01-01

## 2020-01-01 RX ORDER — DEXTROSE MONOHYDRATE 100 MG/ML
125-250 INJECTION, SOLUTION INTRAVENOUS AS NEEDED
Status: DISCONTINUED | OUTPATIENT
Start: 2020-01-01 | End: 2020-01-01

## 2020-01-01 RX ORDER — LORAZEPAM 2 MG/ML
2 INJECTION INTRAMUSCULAR ONCE
Status: COMPLETED | OUTPATIENT
Start: 2020-01-01 | End: 2020-01-01

## 2020-01-01 RX ORDER — INSULIN GLARGINE 100 [IU]/ML
10 INJECTION, SOLUTION SUBCUTANEOUS DAILY
Status: DISCONTINUED | OUTPATIENT
Start: 2020-01-01 | End: 2020-01-01

## 2020-01-01 RX ORDER — ATORVASTATIN CALCIUM 20 MG/1
40 TABLET, FILM COATED ORAL DAILY
Status: DISCONTINUED | OUTPATIENT
Start: 2020-01-01 | End: 2020-01-01

## 2020-01-01 RX ORDER — CLOPIDOGREL BISULFATE 75 MG/1
75 TABLET ORAL DAILY
COMMUNITY
Start: 2020-01-01

## 2020-01-01 RX ORDER — SODIUM CHLORIDE 0.9 % (FLUSH) 0.9 %
5-40 SYRINGE (ML) INJECTION AS NEEDED
Status: DISCONTINUED | OUTPATIENT
Start: 2020-01-01 | End: 2020-01-01

## 2020-01-01 RX ORDER — FUROSEMIDE 10 MG/ML
40 INJECTION INTRAMUSCULAR; INTRAVENOUS
Status: DISCONTINUED | OUTPATIENT
Start: 2020-01-01 | End: 2020-01-01

## 2020-01-01 RX ORDER — IPRATROPIUM BROMIDE AND ALBUTEROL SULFATE 2.5; .5 MG/3ML; MG/3ML
3 SOLUTION RESPIRATORY (INHALATION)
Status: DISCONTINUED | OUTPATIENT
Start: 2020-01-01 | End: 2020-01-01 | Stop reason: HOSPADM

## 2020-01-01 RX ORDER — MAGNESIUM SULFATE 100 %
4 CRYSTALS MISCELLANEOUS AS NEEDED
Status: DISCONTINUED | OUTPATIENT
Start: 2020-01-01 | End: 2020-01-01

## 2020-01-01 RX ORDER — FUROSEMIDE 10 MG/ML
20 INJECTION INTRAMUSCULAR; INTRAVENOUS ONCE
Status: COMPLETED | OUTPATIENT
Start: 2020-01-01 | End: 2020-01-01

## 2020-01-01 RX ORDER — PHENYTOIN SODIUM 50 MG/ML
100 INJECTION, SOLUTION INTRAMUSCULAR; INTRAVENOUS 2 TIMES DAILY
Status: DISCONTINUED | OUTPATIENT
Start: 2020-01-01 | End: 2020-01-01

## 2020-01-01 RX ORDER — TAMSULOSIN HYDROCHLORIDE 0.4 MG/1
0.4 CAPSULE ORAL DAILY
COMMUNITY
Start: 2020-01-01

## 2020-01-01 RX ORDER — TAMSULOSIN HYDROCHLORIDE 0.4 MG/1
0.4 CAPSULE ORAL DAILY
Status: DISCONTINUED | OUTPATIENT
Start: 2020-01-01 | End: 2020-01-01 | Stop reason: HOSPADM

## 2020-01-01 RX ORDER — HALOPERIDOL 5 MG/ML
3 INJECTION INTRAMUSCULAR
Status: DISCONTINUED | OUTPATIENT
Start: 2020-01-01 | End: 2020-01-01

## 2020-01-01 RX ORDER — CLOPIDOGREL BISULFATE 75 MG/1
75 TABLET ORAL DAILY
Status: DISCONTINUED | OUTPATIENT
Start: 2020-01-01 | End: 2020-01-01 | Stop reason: HOSPADM

## 2020-01-01 RX ORDER — LORAZEPAM 2 MG/ML
INJECTION INTRAMUSCULAR
Status: COMPLETED
Start: 2020-01-01 | End: 2020-01-01

## 2020-01-01 RX ORDER — LORAZEPAM 2 MG/ML
3 INJECTION INTRAMUSCULAR
Status: DISCONTINUED | OUTPATIENT
Start: 2020-01-01 | End: 2020-01-01

## 2020-01-01 RX ORDER — BUDESONIDE 0.5 MG/2ML
500 INHALANT ORAL
Status: DISCONTINUED | OUTPATIENT
Start: 2020-01-01 | End: 2020-01-01 | Stop reason: HOSPADM

## 2020-01-01 RX ORDER — PHENYTOIN SODIUM 100 MG/1
100 CAPSULE, EXTENDED RELEASE ORAL 2 TIMES DAILY
Status: DISCONTINUED | OUTPATIENT
Start: 2020-01-01 | End: 2020-01-01 | Stop reason: HOSPADM

## 2020-01-01 RX ORDER — DIPHENHYDRAMINE HYDROCHLORIDE 50 MG/ML
12.5 INJECTION, SOLUTION INTRAMUSCULAR; INTRAVENOUS
Status: DISCONTINUED | OUTPATIENT
Start: 2020-01-01 | End: 2020-01-01

## 2020-01-01 RX ORDER — INSULIN LISPRO 100 [IU]/ML
INJECTION, SOLUTION INTRAVENOUS; SUBCUTANEOUS EVERY 6 HOURS
Status: DISCONTINUED | OUTPATIENT
Start: 2020-01-01 | End: 2020-01-01

## 2020-01-01 RX ORDER — PROMETHAZINE HYDROCHLORIDE 25 MG/1
12.5 TABLET ORAL
Status: DISCONTINUED | OUTPATIENT
Start: 2020-01-01 | End: 2020-01-01 | Stop reason: HOSPADM

## 2020-01-01 RX ORDER — ALBUTEROL SULFATE 0.83 MG/ML
2.5 SOLUTION RESPIRATORY (INHALATION)
Status: COMPLETED | OUTPATIENT
Start: 2020-01-01 | End: 2020-01-01

## 2020-01-01 RX ADMIN — METHYLPREDNISOLONE SODIUM SUCCINATE 20 MG: 40 INJECTION, POWDER, FOR SOLUTION INTRAMUSCULAR; INTRAVENOUS at 23:39

## 2020-01-01 RX ADMIN — PHENYTOIN SODIUM 100 MG: 50 INJECTION INTRAMUSCULAR; INTRAVENOUS at 21:15

## 2020-01-01 RX ADMIN — PIPERACILLIN AND TAZOBACTAM 3.38 G: 3; .375 INJECTION, POWDER, LYOPHILIZED, FOR SOLUTION INTRAVENOUS at 05:37

## 2020-01-01 RX ADMIN — BUDESONIDE 500 MCG: 0.5 INHALANT RESPIRATORY (INHALATION) at 07:15

## 2020-01-01 RX ADMIN — FUROSEMIDE 20 MG: 10 INJECTION, SOLUTION INTRAMUSCULAR; INTRAVENOUS at 20:16

## 2020-01-01 RX ADMIN — BUDESONIDE 500 MCG: 0.5 INHALANT RESPIRATORY (INHALATION) at 20:10

## 2020-01-01 RX ADMIN — ALBUMIN (HUMAN) 25 G: 0.25 INJECTION, SOLUTION INTRAVENOUS at 19:35

## 2020-01-01 RX ADMIN — INSULIN LISPRO 8 UNITS: 100 INJECTION, SOLUTION INTRAVENOUS; SUBCUTANEOUS at 12:47

## 2020-01-01 RX ADMIN — PHENYTOIN SODIUM 100 MG: 50 INJECTION INTRAMUSCULAR; INTRAVENOUS at 22:32

## 2020-01-01 RX ADMIN — PIPERACILLIN AND TAZOBACTAM 3.38 G: 3; .375 INJECTION, POWDER, LYOPHILIZED, FOR SOLUTION INTRAVENOUS at 08:45

## 2020-01-01 RX ADMIN — HALOPERIDOL LACTATE 3 MG: 5 INJECTION, SOLUTION INTRAMUSCULAR at 06:30

## 2020-01-01 RX ADMIN — FUROSEMIDE 40 MG: 10 INJECTION, SOLUTION INTRAMUSCULAR; INTRAVENOUS at 08:23

## 2020-01-01 RX ADMIN — PHENYTOIN SODIUM 100 MG: 50 INJECTION INTRAMUSCULAR; INTRAVENOUS at 23:39

## 2020-01-01 RX ADMIN — INSULIN GLARGINE 10 UNITS: 100 INJECTION, SOLUTION SUBCUTANEOUS at 09:48

## 2020-01-01 RX ADMIN — Medication 10 ML: at 20:57

## 2020-01-01 RX ADMIN — PHENYTOIN SODIUM 100 MG: 50 INJECTION INTRAMUSCULAR; INTRAVENOUS at 12:59

## 2020-01-01 RX ADMIN — PIPERACILLIN AND TAZOBACTAM 3.38 G: 3; .375 INJECTION, POWDER, LYOPHILIZED, FOR SOLUTION INTRAVENOUS at 22:45

## 2020-01-01 RX ADMIN — METHYLPREDNISOLONE SODIUM SUCCINATE 125 MG: 125 INJECTION, POWDER, FOR SOLUTION INTRAMUSCULAR; INTRAVENOUS at 21:37

## 2020-01-01 RX ADMIN — Medication 10 ML: at 05:38

## 2020-01-01 RX ADMIN — IPRATROPIUM BROMIDE AND ALBUTEROL SULFATE 3 ML: .5; 3 SOLUTION RESPIRATORY (INHALATION) at 07:15

## 2020-01-01 RX ADMIN — PIPERACILLIN AND TAZOBACTAM 3.38 G: 3; .375 INJECTION, POWDER, LYOPHILIZED, FOR SOLUTION INTRAVENOUS at 17:24

## 2020-01-01 RX ADMIN — PIPERACILLIN AND TAZOBACTAM 3.38 G: 3; .375 INJECTION, POWDER, LYOPHILIZED, FOR SOLUTION INTRAVENOUS at 12:20

## 2020-01-01 RX ADMIN — INSULIN LISPRO 2 UNITS: 100 INJECTION, SOLUTION INTRAVENOUS; SUBCUTANEOUS at 05:22

## 2020-01-01 RX ADMIN — IPRATROPIUM BROMIDE AND ALBUTEROL SULFATE 3 ML: .5; 3 SOLUTION RESPIRATORY (INHALATION) at 07:19

## 2020-01-01 RX ADMIN — PIPERACILLIN AND TAZOBACTAM 3.38 G: 3; .375 INJECTION, POWDER, LYOPHILIZED, FOR SOLUTION INTRAVENOUS at 22:32

## 2020-01-01 RX ADMIN — NITROGLYCERIN 0.5 INCH: 20 OINTMENT TOPICAL at 09:44

## 2020-01-01 RX ADMIN — METHYLPREDNISOLONE SODIUM SUCCINATE 40 MG: 40 INJECTION, POWDER, FOR SOLUTION INTRAMUSCULAR; INTRAVENOUS at 06:01

## 2020-01-01 RX ADMIN — PHENYTOIN SODIUM 100 MG: 50 INJECTION INTRAMUSCULAR; INTRAVENOUS at 22:44

## 2020-01-01 RX ADMIN — INSULIN GLARGINE 10 UNITS: 100 INJECTION, SOLUTION SUBCUTANEOUS at 12:46

## 2020-01-01 RX ADMIN — FOLIC ACID: 5 INJECTION, SOLUTION INTRAMUSCULAR; INTRAVENOUS; SUBCUTANEOUS at 09:26

## 2020-01-01 RX ADMIN — PHENYTOIN SODIUM 100 MG: 50 INJECTION INTRAMUSCULAR; INTRAVENOUS at 13:00

## 2020-01-01 RX ADMIN — Medication 10 ML: at 18:02

## 2020-01-01 RX ADMIN — IPRATROPIUM BROMIDE AND ALBUTEROL SULFATE 3 ML: .5; 3 SOLUTION RESPIRATORY (INHALATION) at 16:29

## 2020-01-01 RX ADMIN — METHYLPREDNISOLONE SODIUM SUCCINATE 20 MG: 40 INJECTION, POWDER, FOR SOLUTION INTRAMUSCULAR; INTRAVENOUS at 22:34

## 2020-01-01 RX ADMIN — IPRATROPIUM BROMIDE AND ALBUTEROL SULFATE 3 ML: .5; 3 SOLUTION RESPIRATORY (INHALATION) at 07:53

## 2020-01-01 RX ADMIN — BUDESONIDE 500 MCG: 0.5 INHALANT RESPIRATORY (INHALATION) at 07:53

## 2020-01-01 RX ADMIN — METHYLPREDNISOLONE SODIUM SUCCINATE 20 MG: 40 INJECTION, POWDER, FOR SOLUTION INTRAMUSCULAR; INTRAVENOUS at 13:00

## 2020-01-01 RX ADMIN — PIPERACILLIN AND TAZOBACTAM 3.38 G: 3; .375 INJECTION, POWDER, LYOPHILIZED, FOR SOLUTION INTRAVENOUS at 04:32

## 2020-01-01 RX ADMIN — PIPERACILLIN AND TAZOBACTAM 3.38 G: 3; .375 INJECTION, POWDER, LYOPHILIZED, FOR SOLUTION INTRAVENOUS at 16:52

## 2020-01-01 RX ADMIN — METHYLPREDNISOLONE SODIUM SUCCINATE 40 MG: 40 INJECTION, POWDER, FOR SOLUTION INTRAMUSCULAR; INTRAVENOUS at 05:04

## 2020-01-01 RX ADMIN — HALOPERIDOL LACTATE 3 MG: 5 INJECTION, SOLUTION INTRAMUSCULAR at 20:56

## 2020-01-01 RX ADMIN — NITROGLYCERIN 0.5 INCH: 20 OINTMENT TOPICAL at 21:15

## 2020-01-01 RX ADMIN — ALBUMIN (HUMAN) 25 G: 0.25 INJECTION, SOLUTION INTRAVENOUS at 23:34

## 2020-01-01 RX ADMIN — PIPERACILLIN AND TAZOBACTAM 3.38 G: 3; .375 INJECTION, POWDER, LYOPHILIZED, FOR SOLUTION INTRAVENOUS at 16:19

## 2020-01-01 RX ADMIN — Medication 10 ML: at 05:49

## 2020-01-01 RX ADMIN — PIPERACILLIN AND TAZOBACTAM 3.38 G: 3; .375 INJECTION, POWDER, LYOPHILIZED, FOR SOLUTION INTRAVENOUS at 19:29

## 2020-01-01 RX ADMIN — METHYLPREDNISOLONE SODIUM SUCCINATE 20 MG: 40 INJECTION, POWDER, FOR SOLUTION INTRAMUSCULAR; INTRAVENOUS at 12:59

## 2020-01-01 RX ADMIN — IPRATROPIUM BROMIDE AND ALBUTEROL SULFATE 3 ML: .5; 3 SOLUTION RESPIRATORY (INHALATION) at 15:19

## 2020-01-01 RX ADMIN — CLOPIDOGREL BISULFATE 75 MG: 75 TABLET ORAL at 10:42

## 2020-01-01 RX ADMIN — INSULIN LISPRO 4 UNITS: 100 INJECTION, SOLUTION INTRAVENOUS; SUBCUTANEOUS at 06:00

## 2020-01-01 RX ADMIN — PIPERACILLIN AND TAZOBACTAM 3.38 G: 3; .375 INJECTION, POWDER, LYOPHILIZED, FOR SOLUTION INTRAVENOUS at 06:12

## 2020-01-01 RX ADMIN — PIPERACILLIN AND TAZOBACTAM 3.38 G: 3; .375 INJECTION, POWDER, LYOPHILIZED, FOR SOLUTION INTRAVENOUS at 17:20

## 2020-01-01 RX ADMIN — PIPERACILLIN AND TAZOBACTAM 3.38 G: 3; .375 INJECTION, POWDER, LYOPHILIZED, FOR SOLUTION INTRAVENOUS at 18:02

## 2020-01-01 RX ADMIN — ENOXAPARIN SODIUM 40 MG: 40 INJECTION SUBCUTANEOUS at 08:32

## 2020-01-01 RX ADMIN — HALOPERIDOL LACTATE 3 MG: 5 INJECTION, SOLUTION INTRAMUSCULAR at 08:35

## 2020-01-01 RX ADMIN — PIPERACILLIN AND TAZOBACTAM 3.38 G: 3; .375 INJECTION, POWDER, LYOPHILIZED, FOR SOLUTION INTRAVENOUS at 17:09

## 2020-01-01 RX ADMIN — SODIUM CHLORIDE 40 MG: 9 INJECTION INTRAMUSCULAR; INTRAVENOUS; SUBCUTANEOUS at 17:00

## 2020-01-01 RX ADMIN — LORAZEPAM 0.5 MG: 2 SOLUTION, CONCENTRATE ORAL at 23:41

## 2020-01-01 RX ADMIN — ALBUMIN (HUMAN) 25 G: 0.25 INJECTION, SOLUTION INTRAVENOUS at 05:56

## 2020-01-01 RX ADMIN — PHENYTOIN SODIUM 100 MG: 50 INJECTION INTRAMUSCULAR; INTRAVENOUS at 08:23

## 2020-01-01 RX ADMIN — SODIUM CHLORIDE 100 ML/HR: 450 INJECTION, SOLUTION INTRAVENOUS at 01:02

## 2020-01-01 RX ADMIN — PIPERACILLIN AND TAZOBACTAM 3.38 G: 3; .375 INJECTION, POWDER, LYOPHILIZED, FOR SOLUTION INTRAVENOUS at 00:03

## 2020-01-01 RX ADMIN — ALBUMIN (HUMAN) 25 G: 0.25 INJECTION, SOLUTION INTRAVENOUS at 18:06

## 2020-01-01 RX ADMIN — METHYLPREDNISOLONE SODIUM SUCCINATE 40 MG: 40 INJECTION, POWDER, FOR SOLUTION INTRAMUSCULAR; INTRAVENOUS at 08:22

## 2020-01-01 RX ADMIN — METHYLPREDNISOLONE SODIUM SUCCINATE 40 MG: 40 INJECTION, POWDER, FOR SOLUTION INTRAMUSCULAR; INTRAVENOUS at 22:44

## 2020-01-01 RX ADMIN — METHYLPREDNISOLONE SODIUM SUCCINATE 80 MG: 125 INJECTION, POWDER, FOR SOLUTION INTRAMUSCULAR; INTRAVENOUS at 18:06

## 2020-01-01 RX ADMIN — METHYLPREDNISOLONE SODIUM SUCCINATE 40 MG: 125 INJECTION, POWDER, FOR SOLUTION INTRAMUSCULAR; INTRAVENOUS at 12:48

## 2020-01-01 RX ADMIN — NITROGLYCERIN 0.5 INCH: 20 OINTMENT TOPICAL at 10:36

## 2020-01-01 RX ADMIN — PHENYTOIN SODIUM 100 MG: 50 INJECTION INTRAMUSCULAR; INTRAVENOUS at 20:12

## 2020-01-01 RX ADMIN — SODIUM CHLORIDE 40 MG: 9 INJECTION INTRAMUSCULAR; INTRAVENOUS; SUBCUTANEOUS at 19:28

## 2020-01-01 RX ADMIN — BUDESONIDE 500 MCG: 0.5 INHALANT RESPIRATORY (INHALATION) at 19:53

## 2020-01-01 RX ADMIN — INSULIN LISPRO 2 UNITS: 100 INJECTION, SOLUTION INTRAVENOUS; SUBCUTANEOUS at 12:38

## 2020-01-01 RX ADMIN — METHYLPREDNISOLONE SODIUM SUCCINATE 20 MG: 40 INJECTION, POWDER, FOR SOLUTION INTRAMUSCULAR; INTRAVENOUS at 22:30

## 2020-01-01 RX ADMIN — ENOXAPARIN SODIUM 40 MG: 40 INJECTION SUBCUTANEOUS at 08:25

## 2020-01-01 RX ADMIN — IPRATROPIUM BROMIDE AND ALBUTEROL SULFATE 3 ML: .5; 3 SOLUTION RESPIRATORY (INHALATION) at 21:37

## 2020-01-01 RX ADMIN — DIPHENHYDRAMINE HYDROCHLORIDE 12.5 MG: 50 INJECTION, SOLUTION INTRAMUSCULAR; INTRAVENOUS at 02:20

## 2020-01-01 RX ADMIN — BUDESONIDE 500 MCG: 0.5 INHALANT RESPIRATORY (INHALATION) at 07:23

## 2020-01-01 RX ADMIN — INSULIN LISPRO 2 UNITS: 100 INJECTION, SOLUTION INTRAVENOUS; SUBCUTANEOUS at 00:38

## 2020-01-01 RX ADMIN — Medication 10 ML: at 17:11

## 2020-01-01 RX ADMIN — IPRATROPIUM BROMIDE AND ALBUTEROL SULFATE 3 ML: .5; 3 SOLUTION RESPIRATORY (INHALATION) at 19:37

## 2020-01-01 RX ADMIN — LORAZEPAM 2 MG: 2 INJECTION INTRAMUSCULAR; INTRAVENOUS at 04:48

## 2020-01-01 RX ADMIN — INSULIN GLARGINE 10 UNITS: 100 INJECTION, SOLUTION SUBCUTANEOUS at 08:32

## 2020-01-01 RX ADMIN — NITROGLYCERIN 0.5 INCH: 20 OINTMENT TOPICAL at 20:56

## 2020-01-01 RX ADMIN — METHYLPREDNISOLONE SODIUM SUCCINATE 80 MG: 125 INJECTION, POWDER, FOR SOLUTION INTRAMUSCULAR; INTRAVENOUS at 05:56

## 2020-01-01 RX ADMIN — PIPERACILLIN AND TAZOBACTAM 3.38 G: 3; .375 INJECTION, POWDER, LYOPHILIZED, FOR SOLUTION INTRAVENOUS at 05:04

## 2020-01-01 RX ADMIN — PIPERACILLIN AND TAZOBACTAM 3.38 G: 3; .375 INJECTION, POWDER, LYOPHILIZED, FOR SOLUTION INTRAVENOUS at 11:03

## 2020-01-01 RX ADMIN — IPRATROPIUM BROMIDE AND ALBUTEROL SULFATE 3 ML: .5; 3 SOLUTION RESPIRATORY (INHALATION) at 07:32

## 2020-01-01 RX ADMIN — IPRATROPIUM BROMIDE AND ALBUTEROL SULFATE 3 ML: .5; 3 SOLUTION RESPIRATORY (INHALATION) at 11:15

## 2020-01-01 RX ADMIN — INSULIN GLARGINE 10 UNITS: 100 INJECTION, SOLUTION SUBCUTANEOUS at 08:23

## 2020-01-01 RX ADMIN — BUDESONIDE 500 MCG: 0.5 INHALANT RESPIRATORY (INHALATION) at 19:37

## 2020-01-01 RX ADMIN — PHENYTOIN SODIUM 100 MG: 50 INJECTION INTRAMUSCULAR; INTRAVENOUS at 12:11

## 2020-01-01 RX ADMIN — FOLIC ACID: 5 INJECTION, SOLUTION INTRAMUSCULAR; INTRAVENOUS; SUBCUTANEOUS at 10:54

## 2020-01-01 RX ADMIN — QUETIAPINE FUMARATE 25 MG: 25 TABLET ORAL at 22:00

## 2020-01-01 RX ADMIN — IPRATROPIUM BROMIDE AND ALBUTEROL SULFATE 3 ML: .5; 3 SOLUTION RESPIRATORY (INHALATION) at 11:07

## 2020-01-01 RX ADMIN — ALBUTEROL SULFATE 2 PUFF: 90 AEROSOL, METERED RESPIRATORY (INHALATION) at 03:01

## 2020-01-01 RX ADMIN — PHENYTOIN SODIUM 100 MG: 50 INJECTION INTRAMUSCULAR; INTRAVENOUS at 08:31

## 2020-01-01 RX ADMIN — DIPHENHYDRAMINE HYDROCHLORIDE 12.5 MG: 50 INJECTION, SOLUTION INTRAMUSCULAR; INTRAVENOUS at 19:58

## 2020-01-01 RX ADMIN — POTASSIUM CHLORIDE 10 MEQ: 10 INJECTION, SOLUTION INTRAVENOUS at 09:00

## 2020-01-01 RX ADMIN — LORAZEPAM 2 MG: 2 INJECTION INTRAMUSCULAR at 14:44

## 2020-01-01 RX ADMIN — ENOXAPARIN SODIUM 40 MG: 40 INJECTION SUBCUTANEOUS at 08:49

## 2020-01-01 RX ADMIN — DIPHENHYDRAMINE HYDROCHLORIDE 12.5 MG: 50 INJECTION, SOLUTION INTRAMUSCULAR; INTRAVENOUS at 04:44

## 2020-01-01 RX ADMIN — IPRATROPIUM BROMIDE AND ALBUTEROL SULFATE 3 ML: .5; 3 SOLUTION RESPIRATORY (INHALATION) at 16:12

## 2020-01-01 RX ADMIN — PIPERACILLIN AND TAZOBACTAM 3.38 G: 3; .375 INJECTION, POWDER, LYOPHILIZED, FOR SOLUTION INTRAVENOUS at 05:56

## 2020-01-01 RX ADMIN — QUETIAPINE FUMARATE 25 MG: 25 TABLET ORAL at 22:24

## 2020-01-01 RX ADMIN — PHENYTOIN SODIUM 100 MG: 100 CAPSULE ORAL at 22:29

## 2020-01-01 RX ADMIN — BUDESONIDE 500 MCG: 0.5 INHALANT RESPIRATORY (INHALATION) at 07:32

## 2020-01-01 RX ADMIN — PHENYTOIN SODIUM 100 MG: 100 CAPSULE ORAL at 08:51

## 2020-01-01 RX ADMIN — IPRATROPIUM BROMIDE AND ALBUTEROL SULFATE 3 ML: .5; 3 SOLUTION RESPIRATORY (INHALATION) at 02:40

## 2020-01-01 RX ADMIN — IPRATROPIUM BROMIDE AND ALBUTEROL SULFATE 3 ML: .5; 3 SOLUTION RESPIRATORY (INHALATION) at 15:21

## 2020-01-01 RX ADMIN — PIPERACILLIN AND TAZOBACTAM 3.38 G: 3; .375 INJECTION, POWDER, LYOPHILIZED, FOR SOLUTION INTRAVENOUS at 22:36

## 2020-01-01 RX ADMIN — IOPAMIDOL 80 ML: 755 INJECTION, SOLUTION INTRAVENOUS at 23:19

## 2020-01-01 RX ADMIN — IPRATROPIUM BROMIDE AND ALBUTEROL SULFATE 3 ML: .5; 3 SOLUTION RESPIRATORY (INHALATION) at 19:53

## 2020-01-01 RX ADMIN — METHYLPREDNISOLONE SODIUM SUCCINATE 80 MG: 125 INJECTION, POWDER, FOR SOLUTION INTRAMUSCULAR; INTRAVENOUS at 00:03

## 2020-01-01 RX ADMIN — MAGNESIUM SULFATE 2 G: 2 INJECTION INTRAVENOUS at 21:36

## 2020-01-01 RX ADMIN — BUDESONIDE 500 MCG: 0.5 INHALANT RESPIRATORY (INHALATION) at 20:29

## 2020-01-01 RX ADMIN — BUDESONIDE 500 MCG: 0.5 INHALANT RESPIRATORY (INHALATION) at 19:58

## 2020-01-01 RX ADMIN — ALBUTEROL SULFATE 2.5 MG: 2.5 SOLUTION RESPIRATORY (INHALATION) at 06:21

## 2020-01-01 RX ADMIN — IPRATROPIUM BROMIDE AND ALBUTEROL SULFATE 3 ML: .5; 3 SOLUTION RESPIRATORY (INHALATION) at 11:12

## 2020-01-01 RX ADMIN — TAMSULOSIN HYDROCHLORIDE 0.4 MG: 0.4 CAPSULE ORAL at 08:52

## 2020-01-01 RX ADMIN — AZITHROMYCIN MONOHYDRATE 500 MG: 500 INJECTION, POWDER, LYOPHILIZED, FOR SOLUTION INTRAVENOUS at 01:08

## 2020-01-01 RX ADMIN — FOLIC ACID: 5 INJECTION, SOLUTION INTRAMUSCULAR; INTRAVENOUS; SUBCUTANEOUS at 14:17

## 2020-01-01 RX ADMIN — ENOXAPARIN SODIUM 40 MG: 40 INJECTION SUBCUTANEOUS at 08:47

## 2020-01-01 RX ADMIN — IPRATROPIUM BROMIDE AND ALBUTEROL SULFATE 3 ML: .5; 3 SOLUTION RESPIRATORY (INHALATION) at 16:11

## 2020-01-01 RX ADMIN — HALOPERIDOL LACTATE 3 MG: 5 INJECTION, SOLUTION INTRAMUSCULAR at 22:15

## 2020-01-01 RX ADMIN — IPRATROPIUM BROMIDE AND ALBUTEROL SULFATE 3 ML: .5; 3 SOLUTION RESPIRATORY (INHALATION) at 11:21

## 2020-01-01 RX ADMIN — PIPERACILLIN AND TAZOBACTAM 3.38 G: 3; .375 INJECTION, POWDER, LYOPHILIZED, FOR SOLUTION INTRAVENOUS at 04:19

## 2020-01-01 RX ADMIN — FUROSEMIDE 20 MG: 10 INJECTION, SOLUTION INTRAMUSCULAR; INTRAVENOUS at 12:13

## 2020-01-01 RX ADMIN — PHENYTOIN SODIUM 100 MG: 50 INJECTION INTRAMUSCULAR; INTRAVENOUS at 22:25

## 2020-01-01 RX ADMIN — FUROSEMIDE 40 MG: 10 INJECTION, SOLUTION INTRAMUSCULAR; INTRAVENOUS at 03:05

## 2020-01-01 RX ADMIN — IPRATROPIUM BROMIDE AND ALBUTEROL SULFATE 3 ML: .5; 3 SOLUTION RESPIRATORY (INHALATION) at 07:27

## 2020-01-01 RX ADMIN — IPRATROPIUM BROMIDE AND ALBUTEROL SULFATE 3 ML: .5; 3 SOLUTION RESPIRATORY (INHALATION) at 20:16

## 2020-01-01 RX ADMIN — ALBUMIN (HUMAN) 25 G: 0.25 INJECTION, SOLUTION INTRAVENOUS at 12:38

## 2020-01-01 RX ADMIN — ACETAMINOPHEN 650 MG: 650 SUPPOSITORY RECTAL at 02:04

## 2020-01-01 RX ADMIN — IPRATROPIUM BROMIDE AND ALBUTEROL SULFATE 3 ML: .5; 3 SOLUTION RESPIRATORY (INHALATION) at 11:23

## 2020-01-01 RX ADMIN — CLOPIDOGREL BISULFATE 75 MG: 75 TABLET ORAL at 08:52

## 2020-01-01 RX ADMIN — PIPERACILLIN AND TAZOBACTAM 3.38 G: 3; .375 INJECTION, POWDER, LYOPHILIZED, FOR SOLUTION INTRAVENOUS at 22:49

## 2020-01-01 RX ADMIN — LORAZEPAM 2 MG: 2 INJECTION INTRAMUSCULAR at 22:25

## 2020-01-01 RX ADMIN — Medication 10 ML: at 06:00

## 2020-01-01 RX ADMIN — SODIUM CHLORIDE 50 ML/HR: 450 INJECTION, SOLUTION INTRAVENOUS at 10:54

## 2020-01-01 RX ADMIN — PIPERACILLIN AND TAZOBACTAM 3.38 G: 3; .375 INJECTION, POWDER, LYOPHILIZED, FOR SOLUTION INTRAVENOUS at 22:06

## 2020-01-01 RX ADMIN — IPRATROPIUM BROMIDE AND ALBUTEROL SULFATE 3 ML: .5; 3 SOLUTION RESPIRATORY (INHALATION) at 11:35

## 2020-01-01 RX ADMIN — ENOXAPARIN SODIUM 40 MG: 40 INJECTION SUBCUTANEOUS at 09:44

## 2020-01-01 RX ADMIN — Medication 10 ML: at 14:00

## 2020-01-01 RX ADMIN — PANTOPRAZOLE SODIUM 40 MG: 40 TABLET, DELAYED RELEASE ORAL at 08:52

## 2020-01-01 RX ADMIN — Medication 10 ML: at 17:24

## 2020-01-01 RX ADMIN — ALBUMIN (HUMAN) 25 G: 0.25 INJECTION, SOLUTION INTRAVENOUS at 02:21

## 2020-01-01 RX ADMIN — LORAZEPAM 1 MG: 2 INJECTION INTRAMUSCULAR at 16:27

## 2020-01-01 RX ADMIN — BUDESONIDE 500 MCG: 0.5 INHALANT RESPIRATORY (INHALATION) at 21:04

## 2020-01-01 RX ADMIN — IPRATROPIUM BROMIDE AND ALBUTEROL SULFATE 3 ML: .5; 3 SOLUTION RESPIRATORY (INHALATION) at 07:33

## 2020-01-01 RX ADMIN — BUDESONIDE 500 MCG: 0.5 INHALANT RESPIRATORY (INHALATION) at 07:19

## 2020-01-01 RX ADMIN — METHYLPREDNISOLONE SODIUM SUCCINATE 40 MG: 125 INJECTION, POWDER, FOR SOLUTION INTRAMUSCULAR; INTRAVENOUS at 06:44

## 2020-01-01 RX ADMIN — FUROSEMIDE 40 MG: 10 INJECTION, SOLUTION INTRAMUSCULAR; INTRAVENOUS at 06:08

## 2020-01-01 RX ADMIN — METHYLPREDNISOLONE SODIUM SUCCINATE 40 MG: 40 INJECTION, POWDER, FOR SOLUTION INTRAMUSCULAR; INTRAVENOUS at 16:52

## 2020-01-01 RX ADMIN — ALBUMIN (HUMAN) 25 G: 0.25 INJECTION, SOLUTION INTRAVENOUS at 12:56

## 2020-01-01 RX ADMIN — ENOXAPARIN SODIUM 40 MG: 40 INJECTION SUBCUTANEOUS at 10:32

## 2020-01-01 RX ADMIN — FOLIC ACID: 5 INJECTION, SOLUTION INTRAMUSCULAR; INTRAVENOUS; SUBCUTANEOUS at 12:59

## 2020-01-01 RX ADMIN — BUDESONIDE 500 MCG: 0.5 INHALANT RESPIRATORY (INHALATION) at 20:16

## 2020-01-01 RX ADMIN — IPRATROPIUM BROMIDE AND ALBUTEROL SULFATE 3 ML: .5; 3 SOLUTION RESPIRATORY (INHALATION) at 11:50

## 2020-01-01 RX ADMIN — METHYLPREDNISOLONE SODIUM SUCCINATE 40 MG: 125 INJECTION, POWDER, FOR SOLUTION INTRAMUSCULAR; INTRAVENOUS at 19:38

## 2020-01-01 RX ADMIN — HALOPERIDOL LACTATE 3 MG: 5 INJECTION, SOLUTION INTRAMUSCULAR at 19:44

## 2020-01-01 RX ADMIN — BUDESONIDE 500 MCG: 0.5 INHALANT RESPIRATORY (INHALATION) at 07:33

## 2020-01-01 RX ADMIN — IPRATROPIUM BROMIDE AND ALBUTEROL SULFATE 3 ML: .5; 3 SOLUTION RESPIRATORY (INHALATION) at 20:29

## 2020-01-01 RX ADMIN — IPRATROPIUM BROMIDE AND ALBUTEROL SULFATE 3 ML: .5; 3 SOLUTION RESPIRATORY (INHALATION) at 15:23

## 2020-01-01 RX ADMIN — FUROSEMIDE 20 MG: 10 INJECTION, SOLUTION INTRAMUSCULAR; INTRAVENOUS at 01:51

## 2020-01-01 RX ADMIN — IPRATROPIUM BROMIDE AND ALBUTEROL SULFATE 3 ML: .5; 3 SOLUTION RESPIRATORY (INHALATION) at 16:01

## 2020-01-01 RX ADMIN — ALBUTEROL SULFATE 2.5 MG: 2.5 SOLUTION RESPIRATORY (INHALATION) at 01:16

## 2020-01-01 RX ADMIN — HALOPERIDOL LACTATE 3 MG: 5 INJECTION, SOLUTION INTRAMUSCULAR at 23:55

## 2020-01-01 RX ADMIN — INSULIN LISPRO 2 UNITS: 100 INJECTION, SOLUTION INTRAVENOUS; SUBCUTANEOUS at 19:41

## 2020-01-01 RX ADMIN — ALBUMIN (HUMAN) 25 G: 0.25 INJECTION, SOLUTION INTRAVENOUS at 19:37

## 2020-01-01 RX ADMIN — IPRATROPIUM BROMIDE AND ALBUTEROL SULFATE 3 ML: .5; 3 SOLUTION RESPIRATORY (INHALATION) at 07:05

## 2020-01-01 RX ADMIN — PHENYTOIN SODIUM 100 MG: 50 INJECTION INTRAMUSCULAR; INTRAVENOUS at 10:31

## 2020-01-01 RX ADMIN — IPRATROPIUM BROMIDE AND ALBUTEROL SULFATE 3 ML: .5; 3 SOLUTION RESPIRATORY (INHALATION) at 21:03

## 2020-01-01 RX ADMIN — PHENYTOIN SODIUM 100 MG: 50 INJECTION INTRAMUSCULAR; INTRAVENOUS at 20:16

## 2020-01-01 RX ADMIN — METHYLPREDNISOLONE SODIUM SUCCINATE 40 MG: 40 INJECTION, POWDER, FOR SOLUTION INTRAMUSCULAR; INTRAVENOUS at 21:06

## 2020-01-01 RX ADMIN — NITROGLYCERIN 0.5 INCH: 20 OINTMENT TOPICAL at 22:45

## 2020-01-01 RX ADMIN — IPRATROPIUM BROMIDE AND ALBUTEROL SULFATE 3 ML: .5; 3 SOLUTION RESPIRATORY (INHALATION) at 19:58

## 2020-01-01 RX ADMIN — PIPERACILLIN AND TAZOBACTAM 3.38 G: 3; .375 INJECTION, POWDER, LYOPHILIZED, FOR SOLUTION INTRAVENOUS at 19:34

## 2020-01-01 RX ADMIN — HALOPERIDOL LACTATE 3 MG: 5 INJECTION, SOLUTION INTRAMUSCULAR at 00:23

## 2020-01-01 RX ADMIN — SODIUM CHLORIDE 40 MG: 9 INJECTION INTRAMUSCULAR; INTRAVENOUS; SUBCUTANEOUS at 18:02

## 2020-01-01 RX ADMIN — PIPERACILLIN AND TAZOBACTAM 3.38 G: 3; .375 INJECTION, POWDER, LYOPHILIZED, FOR SOLUTION INTRAVENOUS at 13:01

## 2020-01-01 RX ADMIN — BUDESONIDE 500 MCG: 0.5 INHALANT RESPIRATORY (INHALATION) at 07:27

## 2020-01-01 RX ADMIN — METHYLPREDNISOLONE SODIUM SUCCINATE 80 MG: 125 INJECTION, POWDER, FOR SOLUTION INTRAMUSCULAR; INTRAVENOUS at 12:30

## 2020-01-01 RX ADMIN — BUDESONIDE 500 MCG: 0.5 INHALANT RESPIRATORY (INHALATION) at 11:49

## 2020-01-01 RX ADMIN — IPRATROPIUM BROMIDE AND ALBUTEROL SULFATE 3 ML: .5; 3 SOLUTION RESPIRATORY (INHALATION) at 11:47

## 2020-01-01 RX ADMIN — Medication 10 ML: at 21:06

## 2020-01-01 RX ADMIN — FOLIC ACID: 5 INJECTION, SOLUTION INTRAMUSCULAR; INTRAVENOUS; SUBCUTANEOUS at 16:44

## 2020-01-01 RX ADMIN — SODIUM CHLORIDE 75 ML/HR: 450 INJECTION, SOLUTION INTRAVENOUS at 12:42

## 2020-01-01 RX ADMIN — LORAZEPAM 1 MG: 2 INJECTION INTRAMUSCULAR at 08:31

## 2020-01-01 RX ADMIN — ALBUMIN (HUMAN) 25 G: 0.25 INJECTION, SOLUTION INTRAVENOUS at 06:35

## 2020-01-01 RX ADMIN — FUROSEMIDE 20 MG: 10 INJECTION, SOLUTION INTRAMUSCULAR; INTRAVENOUS at 08:32

## 2020-01-01 RX ADMIN — IPRATROPIUM BROMIDE AND ALBUTEROL SULFATE 3 ML: .5; 3 SOLUTION RESPIRATORY (INHALATION) at 15:55

## 2020-01-01 RX ADMIN — IPRATROPIUM BROMIDE AND ALBUTEROL SULFATE 3 ML: .5; 3 SOLUTION RESPIRATORY (INHALATION) at 07:23

## 2020-01-01 RX ADMIN — NITROGLYCERIN 0.5 INCH: 20 OINTMENT TOPICAL at 10:32

## 2020-01-01 RX ADMIN — Medication 10 ML: at 21:18

## 2020-01-01 RX ADMIN — METHYLPREDNISOLONE SODIUM SUCCINATE 40 MG: 125 INJECTION, POWDER, FOR SOLUTION INTRAMUSCULAR; INTRAVENOUS at 23:34

## 2020-01-01 RX ADMIN — FUROSEMIDE 20 MG: 10 INJECTION, SOLUTION INTRAMUSCULAR; INTRAVENOUS at 20:12

## 2020-01-01 RX ADMIN — IPRATROPIUM BROMIDE AND ALBUTEROL SULFATE 3 ML: .5; 3 SOLUTION RESPIRATORY (INHALATION) at 15:28

## 2020-01-01 RX ADMIN — PIPERACILLIN AND TAZOBACTAM 3.38 G: 3; .375 INJECTION, POWDER, LYOPHILIZED, FOR SOLUTION INTRAVENOUS at 04:13

## 2020-01-01 RX ADMIN — Medication 10 ML: at 14:18

## 2020-01-01 RX ADMIN — PIPERACILLIN AND TAZOBACTAM 3.38 G: 3; .375 INJECTION, POWDER, LYOPHILIZED, FOR SOLUTION INTRAVENOUS at 10:30

## 2020-01-01 RX ADMIN — METHYLPREDNISOLONE SODIUM SUCCINATE 125 MG: 125 INJECTION, POWDER, FOR SOLUTION INTRAMUSCULAR; INTRAVENOUS at 06:41

## 2020-01-01 RX ADMIN — ALBUMIN (HUMAN) 25 G: 0.25 INJECTION, SOLUTION INTRAVENOUS at 12:29

## 2020-01-01 RX ADMIN — POTASSIUM CHLORIDE 10 MEQ: 10 INJECTION, SOLUTION INTRAVENOUS at 09:39

## 2020-01-01 RX ADMIN — NITROGLYCERIN 0.5 INCH: 20 OINTMENT TOPICAL at 08:48

## 2020-01-01 RX ADMIN — DIPHENHYDRAMINE HYDROCHLORIDE 12.5 MG: 50 INJECTION, SOLUTION INTRAMUSCULAR; INTRAVENOUS at 22:00

## 2020-01-01 RX ADMIN — METHYLPREDNISOLONE SODIUM SUCCINATE 40 MG: 40 INJECTION, POWDER, FOR SOLUTION INTRAMUSCULAR; INTRAVENOUS at 15:54

## 2020-01-01 RX ADMIN — PIPERACILLIN AND TAZOBACTAM 3.38 G: 3; .375 INJECTION, POWDER, LYOPHILIZED, FOR SOLUTION INTRAVENOUS at 08:47

## 2020-01-01 RX ADMIN — HALOPERIDOL LACTATE 2 MG: 5 INJECTION, SOLUTION INTRAMUSCULAR at 01:51

## 2020-01-01 RX ADMIN — LORAZEPAM 2 MG: 2 INJECTION INTRAMUSCULAR at 04:48

## 2020-01-01 RX ADMIN — ALBUTEROL SULFATE 2.5 MG: 2.5 SOLUTION RESPIRATORY (INHALATION) at 05:52

## 2020-01-01 RX ADMIN — LORAZEPAM 1 MG: 2 INJECTION INTRAMUSCULAR at 04:04

## 2020-01-01 RX ADMIN — METHYLPREDNISOLONE SODIUM SUCCINATE 40 MG: 125 INJECTION, POWDER, FOR SOLUTION INTRAMUSCULAR; INTRAVENOUS at 12:38

## 2020-01-01 RX ADMIN — PERFLUTREN 1 ML: 6.52 INJECTION, SUSPENSION INTRAVENOUS at 14:05

## 2020-01-01 RX ADMIN — FOLIC ACID: 5 INJECTION, SOLUTION INTRAMUSCULAR; INTRAVENOUS; SUBCUTANEOUS at 09:00

## 2020-01-01 RX ADMIN — CEFTRIAXONE 1 G: 1 INJECTION, POWDER, FOR SOLUTION INTRAMUSCULAR; INTRAVENOUS at 03:14

## 2020-01-01 RX ADMIN — IPRATROPIUM BROMIDE AND ALBUTEROL SULFATE 3 ML: .5; 3 SOLUTION RESPIRATORY (INHALATION) at 11:16

## 2020-01-01 RX ADMIN — LORAZEPAM 1 MG: 2 INJECTION INTRAMUSCULAR at 15:40

## 2020-01-01 RX ADMIN — PIPERACILLIN AND TAZOBACTAM 3.38 G: 3; .375 INJECTION, POWDER, LYOPHILIZED, FOR SOLUTION INTRAVENOUS at 12:00

## 2020-01-01 RX ADMIN — ALBUMIN (HUMAN) 25 G: 0.25 INJECTION, SOLUTION INTRAVENOUS at 05:55

## 2020-01-01 RX ADMIN — PIPERACILLIN AND TAZOBACTAM 3.38 G: 3; .375 INJECTION, POWDER, LYOPHILIZED, FOR SOLUTION INTRAVENOUS at 23:40

## 2020-01-01 RX ADMIN — ENOXAPARIN SODIUM 40 MG: 40 INJECTION SUBCUTANEOUS at 10:36

## 2020-01-01 RX ADMIN — ALBUMIN (HUMAN) 25 G: 0.25 INJECTION, SOLUTION INTRAVENOUS at 00:03

## 2020-01-01 RX ADMIN — IPRATROPIUM BROMIDE AND ALBUTEROL SULFATE 3 ML: .5; 3 SOLUTION RESPIRATORY (INHALATION) at 11:13

## 2020-01-01 RX ADMIN — BUDESONIDE 500 MCG: 0.5 INHALANT RESPIRATORY (INHALATION) at 07:05

## 2020-01-01 RX ADMIN — IPRATROPIUM BROMIDE AND ALBUTEROL SULFATE 3 ML: .5; 3 SOLUTION RESPIRATORY (INHALATION) at 20:09

## 2020-09-28 PROBLEM — F17.200 TOBACCO DEPENDENCE: Status: ACTIVE | Noted: 2020-01-01

## 2020-09-28 PROBLEM — J44.1 CHRONIC OBSTRUCTIVE PULMONARY DISEASE WITH ACUTE EXACERBATION (HCC): Status: ACTIVE | Noted: 2020-01-01

## 2020-09-29 PROBLEM — Z20.822 SUSPECTED 2019 NOVEL CORONAVIRUS INFECTION: Status: ACTIVE | Noted: 2020-01-01

## 2020-09-29 PROBLEM — J44.1 COPD EXACERBATION (HCC): Status: ACTIVE | Noted: 2020-01-01

## 2020-09-29 PROBLEM — F03.90 DEMENTIA (HCC): Status: ACTIVE | Noted: 2020-01-01

## 2020-09-29 PROBLEM — I25.10 CAD (CORONARY ARTERY DISEASE): Status: ACTIVE | Noted: 2020-01-01

## 2020-09-29 PROBLEM — R06.03 ACUTE RESPIRATORY DISTRESS: Status: ACTIVE | Noted: 2020-01-01

## 2020-09-29 PROBLEM — R46.89 COMBATIVE BEHAVIOR: Status: ACTIVE | Noted: 2020-01-01

## 2020-09-29 PROBLEM — R06.03 RESPIRATORY DISTRESS: Status: ACTIVE | Noted: 2020-01-01

## 2020-09-29 PROBLEM — J96.01 ACUTE RESPIRATORY FAILURE WITH HYPOXIA AND HYPERCAPNIA (HCC): Status: ACTIVE | Noted: 2020-01-01

## 2020-09-29 PROBLEM — G47.33 OSA (OBSTRUCTIVE SLEEP APNEA): Status: ACTIVE | Noted: 2020-01-01

## 2020-09-29 PROBLEM — J96.02 ACUTE RESPIRATORY FAILURE WITH HYPOXIA AND HYPERCAPNIA (HCC): Status: ACTIVE | Noted: 2020-01-01

## 2020-09-29 PROBLEM — Z99.81 SUPPLEMENTAL OXYGEN DEPENDENT: Status: ACTIVE | Noted: 2020-01-01

## 2020-09-29 PROBLEM — R41.82 ALTERED MENTAL STATUS: Status: ACTIVE | Noted: 2020-01-01

## 2020-09-29 PROBLEM — R09.02 HYPOXIA: Status: ACTIVE | Noted: 2020-01-01

## 2020-09-29 PROBLEM — F10.10 ALCOHOL ABUSE: Status: ACTIVE | Noted: 2020-01-01

## 2020-09-29 NOTE — ED NOTES
Able to place pulse Ox on patients left small toe, O2 saturations were 87% on 2 liters. O2 increased to 3 Liters raising pulse Ox to 93%.

## 2020-09-29 NOTE — CONSULTS
Pulmonary Specialists Pulmonary, Critical Care, and Sleep Medicine Name: Sherren Lea MRN: [id number] : 1940 Hospital: [facility] [place] Date: 2020  Room: 103/01 [facility] Note Consult requesting physician: Dr. Gris Schultz Reason for Consult: COPD exacerbation IMPRESSION:  
Acute respiratory failure with hypoxia and hypercapnia (HCC) J96.01, J96.02 Chronic obstructive pulmonary disease with acute exacerbation (HCC) J44.1 Altered mental status R41.82 Dementia ([facility] [address] F03.90 · · Patient Active Problem List  
Diagnosis Code  Tobacco dependence F17.200  Chronic obstructive pulmonary disease with acute exacerbation (HCC) J44.1  Supplemental oxygen dependent Z99.81  
 Suspected 2019 novel coronavirus infection Z20.828  
 Hypoxia R09.02  
 CAD (coronary artery disease) I25.10  Combative behavior R46.89  
 Alcohol abuse F10.10  [facility] F03.90  
 BRANDEN (obstructive sleep apnea) G47.33  
 Acute respiratory distress R06.03  
 Acute respiratory failure with hypoxia and hypercapnia (HCC) J96.01, J96.02  
 Altered mental status R41.82  
 
 
 
· Code status: DNR/DNI  
  
RECOMMENDATIONS:  
Respiratory: Hx COPD, chronic hypoxic respiratory failure on home O2, active smoker, BRANDEN intolerant to CPAP. Admitted with acute on chronic hypoxic and hypercapnic respiratory failure, COPD exacerbation. CTA chest: Negative for PE. Bronchial wall thickening and possible basilar atelectasis versus hypoinflation. Patient son made him DNR/DNI. Continue HF NC to keep SPO2 92 to 93%. Could not tolerate BiPAP in ER due to agitation/dementia. At high risk for aspiration. Keep n.p.o.  HOB more than 30 degrees all the time. Keep SPO2 >=92%. HOB 30 degree elevation all the time. Aggressive pulmonary toileting. Aspiration precautions. Incentive spirometry. CVS: Hx MI in 03/2020, not a candidate for CABG, 6 stent placed. Continue Plavix, Nitro-Bid. Start statin if able to take p.o. Check serial cardiac enzymes. Echocardiogram pending. ID:  
Rapid COVID19 negative. COVID19 9/29/2020: Pending. CTA chest with possible bronchitis. Lactic acid normal.  No fever or leukocytosis. Antibiotic: Zosyn for bronchitis. Deescalate antibiotic when appropriate. Hematology/Oncology: No acute issues. Renal: Normal creatinine. Monitor electrolytes, renal function and urine output. GI/: On Flomax for BPH. Endocrine: Monitor BS. Monitor for any hypoglycemia. Neurology: Hx dementia. Admitted with severe agitation, could be due to COPD exacerbation with hypoxic and hypercapnic respiratory failure or worsening dementia. Received Haldol in ER. Can use PRN. If severe agitation, may consider Seroquel. Consider CT head when stable and cooperates. Toxicology: Hx alcohol abuse. Banana bag daily. Continue to follow CIWA scoring and management. Pain/Sedation: No acute issues. Skin/Wound: No acute issues. Electrolytes: Replace electrolytes per ICU electrolyte replacement protocol. IVF: None. Nutrition: N.p.o.  Strict aspiration precautions. Prophylaxis: DVT Prophylaxis: SCD/Lovenox. GI Prophylaxis: Low risk for stress ulcer. Thomasena Dilling Restraints: none Lines/Tubes: PIV Fitzgerald: Placed in ER 9/29/2020. Urine analysis and urine culture ordered. (Medically necessary for strict input/output monitoring in critically ill patient, will remove it when not needed. Fitzgerald bundle followed). Advance Directive/Palliative Care: consulted, discussed with palliative care team as well Will defer respective systems problem management to primary and other respective consultant and follow patient in ICU with primary and other medical team. 
Further recommendations will be based on the patient's response to recommended treatment and results of the investigation ordered. Quality Care: PPI, DVT prophylaxis, HOB elevated, Infection control all reviewed and addressed. Care of plan d/w hospitalist team, RN, RT, MDR. 
D/w patient's son Joao Ulloa at bedside and on the phone, poor prognosis discussed and answered all questions to his satisfaction. High complexity decision making was performed during the evaluation of this patient at high risk for decompensation with multiple organ involvement. Total critical care time spent rendering care exclusive of procedures/family discussion/coordination of care: 65 minutes. Subjective/History of Present Illness:  
 
Patient is a 68 y.o. male with PMHx significant for dementia, CAD, alcohol abuse, tobacco dependence/smoker, BRANDEN intolerant with CPAP, chronic hypoxic respiratory failure on home O2, COPD; admitted with combative behavior likely due to dementia, COPD exacerbation, acute hypoxic and hypercapnic respiratory failure. Patient used to live other state, not able to take care of him and so recently moved to live with his son in [place]. He had MRI in 03/2020, not a candidate for CABG, had 6 stent placed, per history. He still an active smoker and alcohol abuser. Hx BRANDEN but intolerant to CPAP and not using CPAP. He is chronic hypoxic respiratory failure on home O2, but an active smoker. Due to severe agitation and combative behavior with breathing problems, son brought him to the hospital. 
In ER, patient appeared to be in acute COPD exacerbation with hypoxic and hypercapnic respiratory failure on ABG. Initially was placed on BiPAP, which she could not tolerate and so it was changed to HF NC. Repeat COVID19 in ER negative. When I evaluated patient in ER room 1, patient already had received Haldol and was almost unresponsive with respiratory distress and prolonged expiration, on HF NC with SPO2 in 85 to 87% range. Hemodynamically stable. No fever in ER. I called and spoke with son at length from ER and got the detailed medical history. I also discussed with him that to manage patient's care, he ideally needs to be intubated and placed on a ventilator, sedated. Discussed that if he continued to have respiratory distress, he will have respiratory muscle fatigue leading to further respiratory failure, aspiration etc. and can lead to urgent intubation and ventilator need. Also discussed he could have cardiac arrest with respiratory arrest. 
Patient son [facility] wanted to come and visit him before we can make any decision. Patient was transferred to ICU. Son [facility] arrived to ICU where I discussed with him personally at bedside and updated with all above medical condition. Patient's wife  about 18 months ago at home. Patient son mentioned that patient never would like to accept poor quality of life or to live with nursing home. Discussed with son that if patient survives, he may have deconditioning and require at least rehab placement depending on his dementia/agitation and mental status and physical condition at the time of discharge. Patient stated that all his father wanted to probably do is to go home and keep smoking. [facility] also spoke with his brother on the way to hospital, both of them had made decision to make DNR/DNI. [facility] would like to continue current care and give him a chance to see if he turns around/condition improves. If not then he would like to further discuss about goal of care. Palliative care consulted. 2020: 
Patient admitted to ICU in altered mental status/severe agitation along with acute hypoxic and hypercapnic respiratory failure with COPD exacerbation, on HF NC. Patient again seen in ICU room #3 with son at bedside. Patient calm but somnolent. Moving all 4 extremities. Hemodynamically stable. On HF NC with SPO2 improved to 95%. No fever. [facility] was called for this admission in the late morning. I/O last 24 hrs: Intake/Output Summary (Last 24 hours) at 9/29/2020 1106 Last data filed at 9/29/2020 1000 Gross per 24 hour Intake 150 ml Output 1200 ml Net -1050 ml The patient is critically ill and can not provide additional history due to Unable to comprehend History taken from patient's son Brynn Briggs and  EMR Review of Systems: 
ROS not obtained due to patient factor. No Known Allergies Past Medical History:  
Diagnosis Date  CAD (coronary artery disease)  Chronic obstructive pulmonary disease (HCC)   
 on 2 liters at home  Dementia ([address]  BRANDEN (obstructive sleep apnea) Past Surgical History:  
Procedure Laterality Date  CARDIAC SURG PROCEDURE UNLIST    
 stents x6 Social History Tobacco Use  Smoking status: Current Every Day Smoker Packs/day: 2.00  Smokeless tobacco: Never Used Substance Use Topics  Alcohol use: Yes Alcohol/week: 7.0 standard drinks Types: 7 Cans of beer per week Family History Problem Relation Age of Onset  Heart Disease Father Prior to Admission medications Medication Sig Start Date End Date Taking? Authorizing Provider  
clopidogreL (Plavix) 75 mg tab Take 75 mg by mouth daily. Indications: stent placement   Yes Kiko, MD Evelin  
potassium chloride (KLOR-CON) 10 mEq tablet Take 10 mEq by mouth daily. Yes Kiko, MD Evelin  
tamsulosin (Flomax) 0.4 mg capsule Take 0.4 mg by mouth. Indications: enlarged prostate with urination problem   Yes Kiko, MD Evelin  
doxycycline (ADOXA) 100 mg tablet Take 100 mg by mouth two (2) times a day. Yes Evelin Hoover MD  
phenytoin ER (Dilantin Extended) 100 mg ER capsule Take 100 mg by mouth two (2) times a day. Indications: epilepsy   Yes Kiko, MD Evelin  
furosemide (Lasix) 40 mg tablet Take 40 mg by mouth daily. Indications: visible water retention   Yes Kiko, MD Evelin  
 
Current Facility-Administered Medications Medication Dose Route Frequency  sodium chloride (NS) flush 5-40 mL  5-40 mL IntraVENous Q8H  
 enoxaparin (LOVENOX) injection 40 mg  40 mg SubCUTAneous DAILY  nitroglycerin (NITROBID) 2 % ointment 0.5 Inch  0.5 Inch Topical BID  clopidogreL (PLAVIX) tablet 75 mg  75 mg Oral DAILY  phenytoin ER (DILANTIN ER) ER capsule 100 mg  100 mg Oral BID  tamsulosin (FLOMAX) capsule 0.4 mg  0.4 mg Oral DAILY  nicotine (NICODERM CQ) 21 mg/24 hr patch 1 Patch  1 Patch TransDERmal DAILY  albumin human 25% (BUMINATE) solution 25 g  25 g IntraVENous Q6H  
 methylPREDNISolone (PF) (Solu-MEDROL) injection 80 mg  80 mg IntraVENous Q6H  
 0.9% sodium chloride 1,000 mL with mvi, adult no. 4 with vit K 10 mL, thiamine 936 mg, folic acid 1 mg infusion   IntraVENous DAILY  piperacillin-tazobactam (ZOSYN) 3.375 g in 0.9% sodium chloride (MBP/ADV) 100 mL MBP  3.375 g IntraVENous Q8H Objective:  
Vital Signs:   
Visit Vitals BP (!) 149/76 Pulse 100 Temp 97.8 °F (36.6 °C) Resp 26 Ht 5' 8\" (1.727 m) Wt 78.7 kg (173 lb 9.6 oz) SpO2 97% BMI 26.40 kg/m² O2 Device: Heated, Hi flow nasal cannula O2 Flow Rate (L/min): 50 l/min Temp (24hrs), Av.7 °F (36.5 °C), Min:97.3 °F (36.3 °C), Max:98.1 °F (36.7 °C) Intake/Output:  
Last shift:      701 - 1900 In: 100 [I.V.:100] Out: 1200 [Urine:1200] Last 3 shifts: 1901 -  0700 In: 48 [I.V.:50] Out: - Intake/Output Summary (Last 24 hours) at 2020 1106 Last data filed at 2020 1000 Gross per 24 hour Intake 150 ml Output 1200 ml Net -1050 ml Last 3 Recorded Weights in this Encounter  
 20 Weight: 78.7 kg (173 lb 9.6 oz) Recent Labs  
  20 
0756 20 
2137 PHI 7.37 7.40 PCO2I 54.0* 41.1 PO2I 193* 74* HCO3I 31.1* 25.8 FIO2I 100 0.28 Physical Exam:  
 
General/Neurology: Agitated, somnolent. Moving all 4 extremities. Mildly prolonged expiration and respiratory distress. Head:   Normocephalic, without obvious abnormality, atraumatic. Eye:   No scleral icterus, no pallor, no cyanosis. Nose:   No sinus tenderness Throat:  Lips, mucosa, and tongue normal. No oral thrush. Neck:   Supple, symmetric. No lymphadenopathy. Trachea midline Lung:   Reduced air entry bilaterally equal.  Prolonged expiration and accessory muscle use. No wheezing, rhonchi or rales. Heart:   S1 S2 present. No murmur. No JVD. Abdomen:  Soft. NT. ND. +BS. No masses. Extremities:  No pedal edema. No cyanosis. No clubbing. Pulses: 2+ and symmetric in DP. Capillary refill: normal 
Lymphatic:  No cervical or supraclavicular palpable lymphadenopathy. Musculoskeletal: No joint swelling. No tenderness. Skin:   Color, texture, turgor normal. No rashes or lesions. Data:  
   
Recent Results (from the past 24 hour(s)) EKG, 12 LEAD, INITIAL Collection Time: 09/28/20  8:55 PM  
Result Value Ref Range Ventricular Rate 93 BPM  
 Atrial Rate 93 BPM  
 P-R Interval 144 ms QRS Duration 92 ms Q-T Interval 368 ms QTC Calculation (Bezet) 457 ms Calculated P Axis 50 degrees Calculated R Axis -4 degrees Calculated T Axis 53 degrees Diagnosis Poor data quality, interpretation may be adversely affected Normal sinus rhythm Low voltage QRS Cannot rule out Anterior infarct , age undetermined Abnormal ECG No previous ECGs available Confirmed by Hetal Diaz MD. ([id number]) on 9/28/2020 9:54:49 PM 
  
CBC WITH AUTOMATED DIFF Collection Time: 09/28/20  9:02 PM  
Result Value Ref Range WBC 12.0 4.6 - 13.2 K/uL  
 RBC 4.24 (L) 4.70 - 5.50 M/uL  
 HGB 13.3 13.0 - 16.0 g/dL HCT 39.9 36.0 - 48.0 % MCV 94.1 74.0 - 97.0 FL  
 MCH 31.4 24.0 - 34.0 PG  
 MCHC 33.3 31.0 - 37.0 g/dL  
 RDW 14.5 11.6 - 14.5 % PLATELET 482 560 - 553 K/uL MPV 8.8 (L) 9.2 - 11.8 FL  
 NEUTROPHILS 82 (H) 40 - 73 % LYMPHOCYTES 11 (L) 21 - 52 % MONOCYTES 7 3 - 10 % EOSINOPHILS 0 0 - 5 % BASOPHILS 0 0 - 2 %  
 ABS. NEUTROPHILS 9.8 (H) 1.8 - 8.0 K/UL  
 ABS. LYMPHOCYTES 1.3 0.9 - 3.6 K/UL  
 ABS. MONOCYTES 0.8 0.05 - 1.2 K/UL  
 ABS. EOSINOPHILS 0.0 0.0 - 0.4 K/UL  
 ABS. BASOPHILS 0.0 0.0 - 0.1 K/UL  
 DF AUTOMATED PROTHROMBIN TIME + INR Collection Time: 09/28/20  9:02 PM  
Result Value Ref Range Prothrombin time 13.5 11.5 - 15.2 sec INR 1.0 0.8 - 1.2 PTT Collection Time: 09/28/20  9:02 PM  
Result Value Ref Range aPTT 33.0 23.0 - 36.4 SEC METABOLIC PANEL, COMPREHENSIVE Collection Time: 09/28/20  9:02 PM  
Result Value Ref Range Sodium 141 136 - 145 mmol/L Potassium 3.8 3.5 - 5.5 mmol/L Chloride 105 100 - 111 mmol/L  
 CO2 31 21 - 32 mmol/L Anion gap 5 3.0 - 18 mmol/L Glucose 94 74 - 99 mg/dL BUN 15 7.0 - 18 MG/DL Creatinine 0.72 0.6 - 1.3 MG/DL  
 BUN/Creatinine ratio 21 (H) 12 - 20 GFR est AA >60 >60 ml/min/1.73m2 GFR est non-AA >60 >60 ml/min/1.73m2 Calcium 8.8 8.5 - 10.1 MG/DL Bilirubin, total 0.3 0.2 - 1.0 MG/DL  
 ALT (SGPT) 16 16 - 61 U/L  
 AST (SGOT) 12 10 - 38 U/L Alk. phosphatase 113 45 - 117 U/L Protein, total 7.6 6.4 - 8.2 g/dL Albumin 3.5 3.4 - 5.0 g/dL Globulin 4.1 (H) 2.0 - 4.0 g/dL A-G Ratio 0.9 0.8 - 1.7 CARDIAC PANEL,(CK, CKMB & TROPONIN) Collection Time: 09/28/20  9:02 PM  
Result Value Ref Range CK - MB 2.9 <3.6 ng/ml CK-MB Index 4.1 (H) 0.0 - 4.0 % CK 71 39 - 308 U/L Troponin-I, QT 0.02 0.0 - 0.045 NG/ML  
NT-PRO BNP Collection Time: 09/28/20  9:02 PM  
Result Value Ref Range NT pro-BNP 1,571 0 - 1,800 PG/ML  
POC G3 Collection Time: 09/28/20  9:37 PM  
Result Value Ref Range Device: NASAL CANNULA Flow rate (POC) 2 L/M  
 FIO2 (POC) 0.28 % pH (POC) 7.40 7.35 - 7.45    
 pCO2 (POC) 41.1 35.0 - 45.0 MMHG  
 pO2 (POC) 74 (L) 80 - 100 MMHG  
 HCO3 (POC) 25.8 22 - 26 MMOL/L  
 sO2 (POC) 95 92 - 97 % Base excess (POC) 1 mmol/L  Allens test (POC) N/A    
 Total resp. rate 32 Site RIGHT RADIAL Patient temp. 98.1 Specimen type (POC) ARTERIAL Performed by Lexis Martins SARS-COV-2 Collection Time: 09/29/20  1:15 AM  
Result Value Ref Range SARS-CoV-2 PENDING Specimen source Nasopharyngeal    
 Specimen type NP Swab LACTIC ACID Collection Time: 09/29/20  7:41 AM  
Result Value Ref Range Lactic acid 0.8 0.4 - 2.0 MMOL/L  
POC G3 Collection Time: 09/29/20  7:56 AM  
Result Value Ref Range Device: Non rebreather Flow rate (POC) 14 L/M  
 FIO2 (POC) 100 % pH (POC) 7.37 7.35 - 7.45    
 pCO2 (POC) 54.0 (H) 35.0 - 45.0 MMHG  
 pO2 (POC) 193 (H) 80 - 100 MMHG  
 HCO3 (POC) 31.1 (H) 22 - 26 MMOL/L  
 sO2 (POC) 100 (H) 92 - 97 % Base excess (POC) 6 mmol/L Allens test (POC) YES Total resp. rate 26 Site RIGHT RADIAL Specimen type (POC) ARTERIAL Performed by Omar Ni SARS-COV-2 Collection Time: 09/29/20  8:04 AM  
Result Value Ref Range Specimen source Nasopharyngeal    
 COVID-19 rapid test Not detected NOTD Specimen type NP Swab DRUG SCREEN, URINE Collection Time: 09/29/20  8:56 AM  
Result Value Ref Range BENZODIAZEPINES Negative NEG    
 BARBITURATES Negative NEG    
 THC (TH-CANNABINOL) Negative NEG    
 OPIATES Negative NEG    
 PCP(PHENCYCLIDINE) Negative NEG    
 COCAINE Negative NEG    
 AMPHETAMINES Negative NEG METHADONE Negative NEG HDSCOM (NOTE) Chemistry Recent Labs  
  09/28/20 
2102 GLU 94   
K 3.8  CO2 31 BUN 15  
CREA 0.72  
CA 8.8 AGAP 5  
BUCR 21*   
TP 7.6 ALB 3.5 GLOB 4.1* AGRAT 0.9 Lactic Acid Lactic acid Date Value Ref Range Status 09/29/2020 0.8 0.4 - 2.0 MMOL/L Final  
 
Recent Labs  
  09/29/20 
0741 LAC 0.8 Liver Enzymes Protein, total  
Date Value Ref Range Status 09/28/2020 7.6 6.4 - 8.2 g/dL Final  
 
Albumin Date Value Ref Range Status 09/28/2020 3.5 3.4 - 5.0 g/dL Final  
 
Globulin Date Value Ref Range Status 09/28/2020 4.1 (H) 2.0 - 4.0 g/dL Final  
 
A-G Ratio Date Value Ref Range Status 09/28/2020 0.9 0.8 - 1.7   Final  
 
Alk. phosphatase Date Value Ref Range Status 09/28/2020 113 45 - 117 U/L Final  
 
Recent Labs  
  09/28/20 2102 TP 7.6 ALB 3.5 GLOB 4.1* AGRAT 0.9  CBC w/Diff Recent Labs  
  09/28/20 2102 WBC 12.0  
RBC 4.24* HGB 13.3 HCT 39.9  GRANS 82* LYMPH 11* EOS 0 Cardiac Enzymes Lab Results Component Value Date/Time CPK 71 09/28/2020 09:02 PM  
 CKMB 2.9 09/28/2020 09:02 PM  
 CKND1 4.1 (H) 09/28/2020 09:02 PM  
 TROIQ 0.02 09/28/2020 09:02 PM  
  
 
BNP No results found for: BNP, BNPP, XBNPT Coagulation Recent Labs  
  09/28/20 2102 PTP 13.5 INR 1.0 APTT 33.0 Thyroid  No results found for: T4, T3U, TSH, TSHEXT, TSHEXT No results found for: T4  
 
Urinalysis No results found for: COLOR, APPRN, SPGRU, REFSG, NIKKI, PROTU, GLUCU, KETU, BILU, UROU, GLORIA, LEUKU, GLUKE, EPSU, BACTU, WBCU, RBCU, CASTS, UCRY Micro  No results for input(s): SDES, CULT in the last 72 hours. No results for input(s): CULT in the last 72 hours. Culture data during this hospitalization. All Micro Results None Images report reviewed by me: 
CT (Most Recent) (CT chest reviewed by me) Results from [facility] [place] [place] Encounter encounter on 09/28/20 CTA CHEST W OR W WO CONT Narrative EXAM: CTA chest 
 
INDICATION: Respiratory distress. COMPARISON: None TECHNIQUE: Axial CT imaging from the thoracic inlet through the diaphragm with 
intravenous contrast. Coronal and sagittal MIP reformats were generated. Dose 
reduction techniques used: automated exposure control, adjustment of the mAs 
and/or kVp according to patient size, and iterative reconstruction techniques. Digital imaging and communications in Medicine (DICOM) format image data are available to nonaffiliated external healthcare facilities or entities on a 
secure, media free, reciprocally searchable basis with patient authorization for 
at least 12 months after this study. _______________ FINDINGS: 
 
EXAM QUALITY: Non-diagnostic/Adequate/Excellent PULMONARY ARTERIES: No evidence of pulmonary embolism. MEDIASTINUM: Normal heart size. No evidence of right heart strain. Aorta is 
unremarkable. No pericardial effusion. LUNGS: There is bilateral bronchial thickening and lower lung field interstitial 
coarsening and faint lower lung field opacities. PLEURA: Normal. 
 
AIRWAY: Normal. 
 
LYMPH NODES: No enlarged nodes. UPPER ABDOMEN: There is bilateral adrenal gland thickening. OTHER: No acute or aggressive osseous abnormalities identified. _______________ Impression IMPRESSION: 
 
1. No evidence of pulmonary embolism. 2.  Bilateral bronchial thickening possibly related to bronchitis. 3. There is also bilateral lower lung field interstitial coarsening and faint 
lung opacities which may be related to hypoaeration. Edema or developing 
infiltrates considered less likely. CXR reviewed by me: 
XR (Most Recent). CXR  reviewed by me and compared with previous CXR [place] [facility] [place]20 XR CHEST PORT Narrative EXAM:  AP Portable Chest X-ray 1 view INDICATION: Shortness of breath COMPARISON: None 
 
_______________ FINDINGS:  Heart size appears enlarged partly due to AP magnification and 
mediastinal contours are within normal limits for portable radiograph. There are 
increased interstitial lung markings suggesting mild pulmonary edema with 
prominence of the central pulmonary vasculature. There are no pleural effusions. No acute osseous findings. ________________  Impression IMPRESSION: Increased interstitial lung markings and prominence of the central 
 pulmonary vasculature suggesting mild pulmonary edema. Stewart Guy MD 
9/29/2020

## 2020-09-29 NOTE — ED PROVIDER NOTES
EMERGENCY DEPARTMENT HISTORY AND PHYSICAL EXAM 
 
Date: 9/28/2020 Patient Name: Bernadette Beebe History of Presenting Illness Chief Complaint Patient presents with  Shortness of Breath History Provided By: Patient and Patient's Son 
 
Bernadette Beebe is a 68 y.o. male who presents to the emergency department C/O shortness of breath. Patient presents with his son who provides most of the history as the patient is having difficulty in breathing and unable to provide most information. States that the patient recently moved from [place] to this region about a month ago as he was becoming unable to care for himself at home. States that the patient has a history of COPD as well as lifelong smoker currently still smoking. States that the patient does wear 2 L of oxygen at home but only as needed. He also notes a history of some heart problems with 4 stents placed many years ago and is currently on Plavix. Denies any history of congestive heart failure to his knowledge but does note that he will take 40 mg of Lasix if he develops any swelling in his legs. Denies any recent travel or contact with a COVID-19 patient. Denies any cough or fever. States that the shortness of breath is been going on for last 2 days and is worse on exertion and with lying down. States that he has been using his rescue inhaler at home without relief. Lakeshia Robledo PCP: Evelin Hoover MD 
 
Current Facility-Administered Medications Medication Dose Route Frequency Provider Last Rate Last Dose  azithromycin (ZITHROMAX) 500 mg in 0.9% sodium chloride 250 mL (VIAL-MATE)  500 mg IntraVENous NOW Jeffery Jose, DO      
 albuterol (PROVENTIL VENTOLIN) nebulizer solution 2.5 mg  2.5 mg Nebulization NOW John Purvis, DO      
 
Current Outpatient Medications Medication Sig Dispense Refill  clopidogreL (Plavix) 75 mg tab Take 75 mg by mouth daily. Indications: stent placement  potassium chloride (KLOR-CON) 10 mEq tablet Take 10 mEq by mouth daily.  tamsulosin (Flomax) 0.4 mg capsule Take 0.4 mg by mouth. Indications: enlarged prostate with urination problem  doxycycline (ADOXA) 100 mg tablet Take 100 mg by mouth two (2) times a day.  phenytoin ER (Dilantin Extended) 100 mg ER capsule Take 100 mg by mouth two (2) times a day. Indications: epilepsy  furosemide (Lasix) 40 mg tablet Take 40 mg by mouth daily. Indications: visible water retention Past History Past Medical History: 
Past Medical History:  
Diagnosis Date  Chronic obstructive pulmonary disease (HCC)   
 on 2 liters at home Past Surgical History: 
Past Surgical History:  
Procedure Laterality Date  CARDIAC SURG PROCEDURE UNLIST    
 stents x2 Family History: 
History reviewed. No pertinent family history. Social History: 
Social History Tobacco Use  Smoking status: Current Every Day Smoker Packs/day: 2.00  Smokeless tobacco: Never Used Substance Use Topics  Alcohol use: Yes Alcohol/week: 7.0 standard drinks Types: 7 Cans of beer per week  Drug use: Not on file Allergies No Known Allergies Review of Systems Review of Systems Unable to perform ROS: Severe respiratory distress Constitutional: Negative for fever. Respiratory: Positive for chest tightness, shortness of breath and wheezing. Cardiovascular: Negative for chest pain. Gastrointestinal: Negative for abdominal pain, nausea and vomiting. All other systems reviewed and are negative. All other systems reviewed and are negative. Physical Exam  
 
Vitals:  
 09/28/20 2215 09/28/20 2245 09/28/20 2330 09/29/20 0000 BP: (!) 143/74 (!) 142/80 (!) 149/78 (!) 171/87 Pulse: 98 (!) 104 (!) 104 (!) 103 Resp: 27 25 28 29 Temp:      
SpO2:  97%  (!) 89% Weight:      
Height:      
 
Physical Exam 
 
Nursing notes and vital signs reviewed Airway: intact, speaking normally Breathing: Patient in  moderate distress, no cyanosis Circulation: Peripheral pulses equal 
 
Constitutional: Chronically ill-appearing in moderate distress appearing stated age HEENT: 
Head: Normocephalic, Atraumatic Eyes: PERRL, EOMI, No conjunctival injection Ears: external ears normal 
Nose: No rhinorrhea, external nose normal 
Throat: mucous membranes moist 
Neck: symmetric, trachea midline, no obvious swelling, no JVD Cardiovascular: Regular rate and rhythm, no murmurs Lungs: End expiratory wheezes in the entire lung fields bilaterally, no stridor, increased work of breathing with abdominal breathing and accessory muscle use, increased chest excursion bilaterally Abdomen: Soft, non tender, non distended, normoactive bowel sounds, No rigidity, no peritoneal signs Musculoskeletal:  No evidence of obvious deformity to the back, neck or extremities, no LE edema Skin: Warm, dry, No obvious rashes Neuro: Alert and oriented x 3, CN 2-12 intact, normal speech but patient is only able to speak in 1 or 2 word sentences before becoming too short of breath, strength and sensation full and symmetric bilaterally Psychiatric: Normal mood and affect Diagnostic Study Results Labs - Recent Results (from the past 72 hour(s)) EKG, 12 LEAD, INITIAL Collection Time: 09/28/20  8:55 PM  
Result Value Ref Range Ventricular Rate 93 BPM  
 Atrial Rate 93 BPM  
 P-R Interval 144 ms QRS Duration 92 ms Q-T Interval 368 ms QTC Calculation (Bezet) 457 ms Calculated P Axis 50 degrees Calculated R Axis -4 degrees Calculated T Axis 53 degrees Diagnosis Poor data quality, interpretation may be adversely affected Normal sinus rhythm Low voltage QRS Cannot rule out Anterior infarct , age undetermined Abnormal ECG No previous ECGs available Confirmed by Criss Sanz MD. ([id number]) on 9/28/2020 9:54:49 PM 
  
CBC WITH AUTOMATED DIFF  
 Collection Time: 09/28/20  9:02 PM  
Result Value Ref Range WBC 12.0 4.6 - 13.2 K/uL  
 RBC 4.24 (L) 4.70 - 5.50 M/uL  
 HGB 13.3 13.0 - 16.0 g/dL HCT 39.9 36.0 - 48.0 % MCV 94.1 74.0 - 97.0 FL  
 MCH 31.4 24.0 - 34.0 PG  
 MCHC 33.3 31.0 - 37.0 g/dL  
 RDW 14.5 11.6 - 14.5 % PLATELET 611 187 - 264 K/uL MPV 8.8 (L) 9.2 - 11.8 FL  
 NEUTROPHILS 82 (H) 40 - 73 % LYMPHOCYTES 11 (L) 21 - 52 % MONOCYTES 7 3 - 10 % EOSINOPHILS 0 0 - 5 % BASOPHILS 0 0 - 2 %  
 ABS. NEUTROPHILS 9.8 (H) 1.8 - 8.0 K/UL  
 ABS. LYMPHOCYTES 1.3 0.9 - 3.6 K/UL  
 ABS. MONOCYTES 0.8 0.05 - 1.2 K/UL  
 ABS. EOSINOPHILS 0.0 0.0 - 0.4 K/UL  
 ABS. BASOPHILS 0.0 0.0 - 0.1 K/UL  
 DF AUTOMATED PROTHROMBIN TIME + INR Collection Time: 09/28/20  9:02 PM  
Result Value Ref Range Prothrombin time 13.5 11.5 - 15.2 sec INR 1.0 0.8 - 1.2 PTT Collection Time: 09/28/20  9:02 PM  
Result Value Ref Range aPTT 33.0 23.0 - 36.4 SEC METABOLIC PANEL, COMPREHENSIVE Collection Time: 09/28/20  9:02 PM  
Result Value Ref Range Sodium 141 136 - 145 mmol/L Potassium 3.8 3.5 - 5.5 mmol/L Chloride 105 100 - 111 mmol/L  
 CO2 31 21 - 32 mmol/L Anion gap 5 3.0 - 18 mmol/L Glucose 94 74 - 99 mg/dL BUN 15 7.0 - 18 MG/DL Creatinine 0.72 0.6 - 1.3 MG/DL  
 BUN/Creatinine ratio 21 (H) 12 - 20 GFR est AA >60 >60 ml/min/1.73m2 GFR est non-AA >60 >60 ml/min/1.73m2 Calcium 8.8 8.5 - 10.1 MG/DL Bilirubin, total 0.3 0.2 - 1.0 MG/DL  
 ALT (SGPT) 16 16 - 61 U/L  
 AST (SGOT) 12 10 - 38 U/L Alk. phosphatase 113 45 - 117 U/L Protein, total 7.6 6.4 - 8.2 g/dL Albumin 3.5 3.4 - 5.0 g/dL Globulin 4.1 (H) 2.0 - 4.0 g/dL A-G Ratio 0.9 0.8 - 1.7 CARDIAC PANEL,(CK, CKMB & TROPONIN) Collection Time: 09/28/20  9:02 PM  
Result Value Ref Range CK - MB 2.9 <3.6 ng/ml CK-MB Index 4.1 (H) 0.0 - 4.0 % CK 71 39 - 308 U/L  Troponin-I, QT 0.02 0.0 - 0.045 NG/ML  
POC G3  
 Collection Time: 09/28/20  9:37 PM  
Result Value Ref Range Device: NASAL CANNULA Flow rate (POC) 2 L/M  
 FIO2 (POC) 0.28 % pH (POC) 7.40 7.35 - 7.45    
 pCO2 (POC) 41.1 35.0 - 45.0 MMHG  
 pO2 (POC) 74 (L) 80 - 100 MMHG  
 HCO3 (POC) 25.8 22 - 26 MMOL/L  
 sO2 (POC) 95 92 - 97 % Base excess (POC) 1 mmol/L Allens test (POC) N/A Total resp. rate 32 Site RIGHT RADIAL Patient temp. 98.1 Specimen type (POC) ARTERIAL Performed by [facility] Radiologic Studies -  
CTA CHEST W OR W WO CONT Final Result IMPRESSION:  
  
1. No evidence of pulmonary embolism. 2.  Bilateral bronchial thickening possibly related to bronchitis. 3. There is also bilateral lower lung field interstitial coarsening and faint  
lung opacities which may be related to hypoaeration. Edema or developing  
infiltrates considered less likely. XR CHEST PORT Final Result IMPRESSION: Increased interstitial lung markings and prominence of the central  
pulmonary vasculature suggesting mild pulmonary edema. CT Results  (Last 48 hours) 09/28/20 2334  CTA [address] Final result Impression:  IMPRESSION:  
   
1. No evidence of pulmonary embolism. 2.  Bilateral bronchial thickening possibly related to bronchitis. 3. There is also bilateral lower lung field interstitial coarsening and faint  
lung opacities which may be related to hypoaeration. Edema or developing  
infiltrates considered less likely. Narrative:  EXAM: CTA chest  
   
INDICATION: Respiratory distress. COMPARISON: None TECHNIQUE: Axial CT imaging from the thoracic inlet through the diaphragm with  
intravenous contrast. Coronal and sagittal MIP reformats were generated. Dose  
reduction techniques used: automated exposure control, adjustment of the mAs  
and/or kVp according to patient size, and iterative reconstruction techniques. Digital imaging and communications in Medicine (DICOM) format image data are  
available to nonaffiliated external healthcare facilities or entities on a  
secure, media free, reciprocally searchable basis with patient authorization for  
at least 12 months after this study. _______________ FINDINGS:  
   
EXAM QUALITY: Non-diagnostic/Adequate/Excellent PULMONARY ARTERIES: No evidence of pulmonary embolism. MEDIASTINUM: Normal heart size. No evidence of right heart strain. Aorta is  
unremarkable. No pericardial effusion. LUNGS: There is bilateral bronchial thickening and lower lung field interstitial  
coarsening and faint lower lung field opacities. PLEURA: Normal.  
   
AIRWAY: Normal.  
   
LYMPH NODES: No enlarged nodes. UPPER ABDOMEN: There is bilateral adrenal gland thickening. OTHER: No acute or aggressive osseous abnormalities identified. _______________ CXR Results  (Last 48 hours) 09/28/20 2123  XR CHEST PORT Final result Impression:  IMPRESSION: Increased interstitial lung markings and prominence of the central  
pulmonary vasculature suggesting mild pulmonary edema. Narrative:  EXAM:  AP Portable Chest X-ray 1 view INDICATION: Shortness of breath COMPARISON: None  
   
_______________ FINDINGS:  Heart size appears enlarged partly due to AP magnification and  
mediastinal contours are within normal limits for portable radiograph. There are  
increased interstitial lung markings suggesting mild pulmonary edema with  
prominence of the central pulmonary vasculature. There are no pleural effusions. No acute osseous findings. ________________ Medications given in the ED- Medications  
albuterol (PROVENTIL VENTOLIN) nebulizer solution 2.5 mg (has no administration in time range) azithromycin (ZITHROMAX) 500 mg in 0.9% sodium chloride 250 mL (VIAL-MATE) (has no administration in time range)  
albuterol-ipratropium (DUO-NEB) 2.5 MG-0.5 MG/3 ML (3 mL Nebulization Given 9/28/20 2137)  
magnesium sulfate 2 g/50 ml IVPB (premix or compounded) (0 g IntraVENous IV Completed 9/28/20 2230) methylPREDNISolone (PF) (Solu-MEDROL) injection 125 mg (125 mg IntraVENous Given 9/28/20 2137) iopamidoL (ISOVUE-370) 76 % injection  mL (80 mL IntraVENous Given 9/28/20 2319) Medical Decision Making I reviewed the vital signs, available nursing notes, past medical history, past surgical history, family history and social history. Vital Signs interpretation- I have reviewed the patient's vital signs. Pulse Oximetry interpretation - 90% on Room air Cardiac Monitor interpretation: 
Rate: 94 bpm 
Rhythm: sinus EKG interpretation: (Preliminary) EKG interpretation by Dr. Claire Meek 9:16 PM  
rate 93 normal sinus rhythm, left axis deviation,No ST changes, baseline wandering secondary to the patient's work of breathing Records Reviewed: Nursing Notes and Old Medical Records Procedures: 
Procedures ED Course & MDM:  
Risk stratification: Patient appears in significant distress, his symptoms most likely appear as a COPD exacerbation versus potential development of pulmonary edema, versus infection. Will treat his COPD with DuoNeb, magnesium and Solu-Medrol. Will obtain blood work and chest x-ray and apply supplemental oxygen Data review: Patient continuing to have difficulty in his breathing with increased accessory muscle use. Will give another albuterol treatment. Question if his symptoms are due to development of pulmonary edema as his chest x-ray appears as he could be developing some interstitial edema. Although his clinical appearance does not look like CHF as he has no swelling in his lower extremities.   A CTA of his chest was done that showed bilateral bronchial thickening questioning potential infiltrate although no obvious pneumonia or edema. We will give IV azithromycin for treatment of his COPD and test for coronavirus Problem follow up: I discussed with the patient and son at bedside the results of his laboratory findings and imaging studies. I recommended admission For continued treatment of his respiratory distress. They are agreeable to plan. CONSULT NOTE:  
Dr. Wilfredo Deal spoke with Dr. Papa Handley Specialty: internal medicine Discussed pt's hx, disposition, and available diagnostic and imaging results over the telephone. Reviewed care plans. Is agreeable with admission and requests COVID-19 testing. Diagnosis and Disposition Critical Care: 12:31 AM 
I have spent 60 minutes of critical care time involved in lab review, consultations with specialist, family decision-making, and documentation. During this entire length of time I was immediately available to the patient. Critical Care: The reason for providing this level of medical care for this critically ill patient was due a critical illness that impaired one or more vital organ systems such that there was a high probability of imminent or life threatening deterioration in the patients condition. This care involved high complexity decision making to assess, manipulate, and support vital system functions, to treat this degreee vital organ system failure and to prevent further life threatening deterioration of the patients condition. Core Measures: 
For Hospitalized Patients: 
 
1. Hospitalization Decision Time: The decision to hospitalize the patient was made by Wilfredo Deal DO at 12:31 AM on 9/28/2020 2. Aspirin: Aspirin was not given because the patient did not present with a stroke at the time of their Emergency Department evaluation 12:31 AM  Patient is being admitted to the hospital by Dr. Papa Handley.  The results of their tests and reasons for their admission have been discussed with them and/or available family. They convey agreement and understanding for the need to be admitted and for their admission diagnosis. CONDITIONS ON ADMISSION: 
Sepsis is not present at the time of admission. Deep Vein Thrombosis is not present at the time of admission. Thrombosis is not present at the time of admission. Urinary Tract Infection is not present at the time of admission. Pneumonia is not present at the time of admission. MRSA is not present at the time of admission. Wound infection is not present at the time of admission. Pressure Ulcer is not present at the time of admission. CLINICAL IMPRESSION: 
 
1. Respiratory distress 2. Tobacco dependence 3. Chronic obstructive pulmonary disease with acute exacerbation ([facility] [address] 4. Supplemental oxygen dependent 5. Suspected 2019 novel coronavirus infection Please note that this dictation was completed with LetMeGo, the computer voice recognition software. Quite often unanticipated grammatical, syntax, homophones, and other interpretive errors are inadvertently transcribed by the computer software. Please disregard these errors. Please excuse any errors that have escaped final proofreading.

## 2020-09-29 NOTE — ED NOTES
Dr. Loida Lr at bedside called patients adri Barbour Courser laury who patinet lives with to disucss code status [phone]

## 2020-09-29 NOTE — PROGRESS NOTES
contacted by ED for voluntary inpt psych placement. If at any time Patient becomes involuntary- ED will need to contact Police for a paperless ECO (Emergency Custody Order) who will then call [facility] directly to assist with TDO as needed.  will contact all facilities and they will contact ED directly for questions or acceptances. CM does not need to be notified by staff once bed confirmed to ED by facility. Once all facilities have been contacted should a bed not be available through today. CM will resume search in the morning and continue to work toward transition of care. CM contacted and provided MRN  to [facility], for review CM contacted and provided MRN  To [facility], [facility] for review CM faxed clinical information to [facility] for review CM faxed clinical information to [facility] for review CM faxed clinical information to [facility] for review CM faxed clinical information to [facility]  for review CM faxed clinical information to [facility]  for review CM faxed clinical information to [facility] for review CM faxed clinical information to [facility]  for review CM faxed clinical information to [address] [place] [address] for review CM faxed clinical information to Sahil Rawls [address] [place] [address], [address] [place] [address], [facility], Mark Bland  for review CM faxed clinical information to [facility] for review CM faxed clinical information to [facility]  for review CM faxed clinical information to [facility]  for review CM faxed clinical information to  [facility] for review CM faxed clinical information to [facility]   for review CM faxed clinical information to [facility] for review CM faxed clinical information to [facility]  for review CM faxed clinical information to [facility]  for review CM faxed clinical information to [facility] for review CM faxed clinical information to [facility] for review CM faxed clinical information to [facility] for review CM faxed clinical information to [facility] for review CM faxed clinical information to [facility]  for review

## 2020-09-29 NOTE — CONSULTS
Palliative Medicine Consult [facility]: [phone] ([phone]) [facility]: [phone] [facility]: [phone] Patient Name: Karyna Salas YOB: 1940 Date of Initial Consult: 9/29/2020 Reason for Consult: care decisions Requesting Provider: Meliton Leger MD 
Primary Care Physician: Other, MD Evelin 
  
 SUMMARY:  
Karyna Salas is a 68 y.o. male with a past history of MI, CAD with stents, BRANDEN, COPD, chronic respiratory failure with home oxygen, alcohol use syndrome and tobacco dependence who was admitted on 9/28/2020 from home with a diagnosis of exacerbation of COPD. Current medical issues leading to Palliative Medicine involvement include: COPD, dementia and goals of care. PALLIATIVE DIAGNOSES:  
1. Advanced care decisions 2. Exacerbation of COPD 3. Acute on chronic respiratory failure 4. Alcohol abuse 5. Dementia PLAN:  
1. Advanced care decisions: Palliative care team including Saintclair Rand, MSW and this NP met with patient at bedside. He is currently resting on HFNC in the ICU. Patient's son, Carlos A William, had just left after conversing with the team. Patient lives with Carlos A William and his wife. Per conversations with the admitting physician, Carlos A William is the [place]. Will need to obtain copies of the AMD. Carlos A William has also instructed the physicians regarding patient's code status. Will follow for improvement vs decline in health for additional conversations and to obtain POST. GOALS OF CARE: DNR/DNI, no intubation for respiratory distress. 2. Exacerbation of COPD: presented with 2 day history of increasing shortness of breath refractory to rescue inhaler. IV antibiotics & steroids initiated, currently on HFNC. Primary team managing. 3. Acute on chronic respiratory failure: on home oxygen of 2 LPM baseline. Refuses CPAP for BRANDEN and uses supplemental oxygen per concentrator. 4. Alcohol abuse: per history daily beer drinker. CIWA scale observation. 5. Dementia: per history complicated by severe agitation and combative behavior. 6. Initial consult note routed to primary continuity provider 7. Communicated plan of care with: Palliative IDT 
 
GOALS OF CARE: 
Patient/Health Care Proxy Stated Goals: Prolong life TREATMENT PREFERENCES:  
Code Status: DNR/DNI Advance Care Planning: No flowsheet data found. Medical Interventions: Limited additional interventions Other: As far as possible, the palliative care team has discussed with patient/health care proxy about goals of care/treatment preferences for patient. HISTORY:  
 
History obtained from: chart CHIEF COMPLAINT: altered mental status HPI/SUBJECTIVE: The patient is:  
[] Verbal and participatory [x] Non-participatory due to: Altered mental state Clinical Pain Assessment (nonverbal scale for nonverbal patients): Clinical Pain Assessment Severity: 0 Activity (Movement): Lying quietly, normal position Duration: for how long has pt been experiencing pain (e.g., 2 days, 1 month, years) Frequency: how often pain is an issue (e.g., several times per day, once every few days, constant) FUNCTIONAL ASSESSMENT:  
 
Palliative Performance Scale (PPS): PPS: 40 ECOG 
ECOG Status : Completely disabled PSYCHOSOCIAL/SPIRITUAL SCREENING:  
  
Any spiritual / Anabaptist concerns: 
[] Yes /  [x] No 
 
Caregiver Burnout: 
[] Yes /  [] No /  [x] No Caregiver Present Anticipatory grief assessment:  
[x] Normal  / [] Maladaptive REVIEW OF SYSTEMS:  
 
Positive and pertinent negative findings in ROS are noted above in HPI. The following systems were [x] reviewed / [] unable to be reviewed as noted in HPI Other findings are noted below. Systems: constitutional, ears/nose/mouth/throat, respiratory, gastrointestinal, genitourinary, musculoskeletal, integumentary, neurologic, psychiatric, endocrine. Positive findings noted below. Modified ESAS Completed by: provider Pain: 0 PHYSICAL EXAM:  
 
Wt Readings from Last 3 Encounters:  
09/28/20 78.7 kg (173 lb 9.6 oz) Blood pressure (!) 151/69, pulse (!) 103, temperature 98.2 °F (36.8 °C), resp. rate 26, height 5' 8\" (1.727 m), weight 78.7 kg (173 lb 9.6 oz), SpO2 99 %. Pain: 
  
  
  
  
  
  
  
 
 
Constitutional: Elderly male, lying in bed in no apparent distress. Eyes: closed. Respiratory: breathing unlabored Musculoskeletal: no deformity Skin: warm, dry HISTORY:  
 
Principal Problem: 
  Chronic obstructive pulmonary disease with acute exacerbation ([address] (9/28/2020) Active Problems: 
  Tobacco dependence (9/28/2020) Supplemental oxygen dependent (9/29/2020) Suspected 2019 novel coronavirus infection (9/29/2020) Hypoxia (9/29/2020) CAD (coronary artery disease) (9/29/2020) Combative behavior (9/29/2020) Alcohol abuse (9/29/2020) Dementia ([address] (9/29/2020) BRANDEN (obstructive sleep apnea) (9/29/2020) Acute respiratory distress (9/29/2020) Acute respiratory failure with hypoxia and hypercapnia ([address] (9/29/2020) Altered mental status (9/29/2020) Past Medical History:  
Diagnosis Date  CAD (coronary artery disease)  Chronic obstructive pulmonary disease (HCC)   
 on 2 liters at home  Dementia ([address]  BRANDEN (obstructive sleep apnea) Past Surgical History:  
Procedure Laterality Date  CARDIAC SURG PROCEDURE UNLIST    
 stents x6 Family History Problem Relation Age of Onset  Heart Disease Father History reviewed, no pertinent family history. Social History Tobacco Use  Smoking status: Current Every Day Smoker Packs/day: 2.00  Smokeless tobacco: Never Used Substance Use Topics  Alcohol use: Yes Alcohol/week: 7.0 standard drinks Types: 7 Cans of beer per week No Known Allergies Current Facility-Administered Medications Medication Dose Route Frequency  sodium chloride (NS) flush 5-40 mL  5-40 mL IntraVENous Q8H  
 acetaminophen (TYLENOL) tablet 650 mg  650 mg Oral Q6H PRN Or  
 acetaminophen (TYLENOL) suppository 650 mg  650 mg Rectal Q6H PRN  polyethylene glycol (MIRALAX) packet 17 g  17 g Oral DAILY PRN  promethazine (PHENERGAN) tablet 12.5 mg  12.5 mg Oral Q6H PRN Or  
 ondansetron (ZOFRAN) injection 4 mg  4 mg IntraVENous Q6H PRN  
 enoxaparin (LOVENOX) injection 40 mg  40 mg SubCUTAneous DAILY  nitroglycerin (NITROBID) 2 % ointment 0.5 Inch  0.5 Inch Topical BID  clopidogreL (PLAVIX) tablet 75 mg  75 mg Oral DAILY  phenytoin ER (DILANTIN ER) ER capsule 100 mg  100 mg Oral BID  tamsulosin (FLOMAX) capsule 0.4 mg  0.4 mg Oral DAILY  diphenhydrAMINE (BENADRYL) injection 12.5 mg  12.5 mg IntraVENous Q6H PRN  
 albuterol (PROVENTIL HFA, VENTOLIN HFA, PROAIR HFA) inhaler (Keep In Patient Room)  2 Puff Inhalation Q4H PRN  
 ipratropium (ATROVENT HFA) 17 mcg inhaler (Keep In Patient)  2 Puff Inhalation Q4H PRN  
 LORazepam (ATIVAN) tablet 1 mg  1 mg Oral Q1H PRN Or  
 LORazepam (ATIVAN) injection 1 mg  1 mg IntraVENous Q1H PRN  
 LORazepam (ATIVAN) tablet 2 mg  2 mg Oral Q1H PRN Or  
 LORazepam (ATIVAN) injection 2 mg  2 mg IntraVENous Q1H PRN  
 LORazepam (ATIVAN) injection 3 mg  3 mg IntraVENous Q15MIN PRN  
 nicotine (NICODERM CQ) 21 mg/24 hr patch 1 Patch  1 Patch TransDERmal DAILY  albumin human 25% (BUMINATE) solution 25 g  25 g IntraVENous Q6H  
 methylPREDNISolone (PF) (Solu-MEDROL) injection 80 mg  80 mg IntraVENous Q6H  
 0.9% sodium chloride 1,000 mL with mvi, adult no. 4 with vit K 10 mL, thiamine 018 mg, folic acid 1 mg infusion   IntraVENous DAILY  haloperidol lactate (HALDOL) injection 3 mg  3 mg IntraVENous Q4H PRN  piperacillin-tazobactam (ZOSYN) 3.375 g in 0.9% sodium chloride (MBP/ADV) 100 mL MBP  3.375 g IntraVENous Q8H  
 [START ON 9/30/2020] atorvastatin (LIPITOR) tablet 40 mg  40 mg Oral DAILY  
 
 
 LAB AND IMAGING FINDINGS:  
 
Lab Results Component Value Date/Time WBC 12.0 09/28/2020 09:02 PM  
 HGB 13.3 09/28/2020 09:02 PM  
 PLATELET 960 29/84/7884 09:02 PM  
 
Lab Results Component Value Date/Time Sodium 141 09/28/2020 09:02 PM  
 Potassium 3.8 09/28/2020 09:02 PM  
 Chloride 105 09/28/2020 09:02 PM  
 CO2 31 09/28/2020 09:02 PM  
 BUN 15 09/28/2020 09:02 PM  
 Creatinine 0.72 09/28/2020 09:02 PM  
 Calcium 8.8 09/28/2020 09:02 PM  
  
Lab Results Component Value Date/Time Alk. phosphatase 113 09/28/2020 09:02 PM  
 Protein, total 7.6 09/28/2020 09:02 PM  
 Albumin 3.5 09/28/2020 09:02 PM  
 Globulin 4.1 (H) 09/28/2020 09:02 PM  
 
Lab Results Component Value Date/Time INR 1.0 09/28/2020 09:02 PM  
 Prothrombin time 13.5 09/28/2020 09:02 PM  
 aPTT 33.0 09/28/2020 09:02 PM  
  
No results found for: IRON, FE, TIBC, IBCT, PSAT, FERR No results found for: PH, PCO2, PO2 No components found for: [place] Lab Results Component Value Date/Time CK 71 09/28/2020 09:02 PM  
 CK - MB 2.9 09/28/2020 09:02 PM  
  
 
   
 
Total time: 30 minutes Counseling / coordination time, spent as noted above > 50% counseling / coordination: 20 minutes with patient, RN Prolonged service was provided for  []30 min   []75 min in face to face time in the presence of the patient, spent as noted above. Time Start:  
Time End:  
Note: this can only be billed with 82621 (initial) or 90976 (follow up). If multiple start / stop times, list each separately.

## 2020-09-29 NOTE — H&P
History & Physical 
 
Patient: Juani William MRN: [id number]  CSN: [id number] YOB: 1940  Age: 68 y.o. Sex: male DOA: 9/28/2020 Primary Care Provider:  Kiko, MD Evelin 
 
 
Assessment/Plan Patient Active Problem List  
Diagnosis Code  Tobacco dependence F17.200  Chronic obstructive pulmonary disease with acute exacerbation (HCC) J44.1  Supplemental oxygen dependent Z99.81  
 Suspected 2019 novel coronavirus infection Z20.828  
 Hypoxia R09.02  
 CAD (coronary artery disease) I25.10  Combative behavior R46.89  
 Alcohol abuse F10.10  Dementia ([facility] F03.90  
 BRANDEN (obstructive sleep apnea) G47.33  
 
67 y/o male with COPD (2L supplemental oxygen dependent), BRANDEN, CAD, alcohol abuse, and tobacco dependence is admitted with a COPD exacerbation, hypoxia, and possible COVID-19 infection. He is also very combative in the hospital and requires large amounts of sedatives. COPD exacerbation with hypoxia -albuterol and atrovent HFA every 4 hours as needed 
-titrate supplemental oxygen to maintain O2 sats between 92-96% 
-rocephin and azithromycin to treat possible community acquired pneumonia 
-solumedrol 125 mg every 8 hours Combative behavior likely due to dementia-very problematic in the hospital 
-will need rapid COVID test so he can have a 1:1 sitter 
-will need placement in a nursing home as his son/wife cannot take care of him Suspected COVID-19-doubt that he has it 
-requesting rapid test as he cannot be in the [facility] safely due to agitation/combative behavior BRANDEN-refuses CPAP and has an oxygen concentrator for nighttime Supplemental oxygen dependent-on 2L at home (baseline) CAD-6 stents placed 
-echocardiogram 
-trend troponins -albumin for IV hydration in case he may have CHF 
-lasix 20 mg IV x1 to see if that improves respiratory status Alcohol abuse 
-CIWA scale in place Tobacco dependence 
-nicotine patch ordered Full code-I discussed the option of making him DNR with his son (HARRIET) Bright Camejo. He wishes to think about it. Palliative care consult placed. Prophylaxis: SCDs, protonix IV, heparin SQ Estimated length of stay : 3-4 nights Sadia Perez MD 
Nocturnist 
---------------------------------------------------------------------------------------------------------------------------------------------------------------------------- 
CC: shortness of breath HPI:  
 
Keny Swanson is a 68 y.o. male with COPD on 2L home oxygen, BRANDEN (on oxygen concentrator at night) and severe CAD with 6 stents placed in March 2020 who presents with worsening shortness of breath for 2 days that is refractory to his rescue inhaler. His son is at bedside and provides all of the history as the patient is tachypneic but also has dementia and can't provide any history. His son reports that he was living in [place] until last month when he moved in with him and his wife. He had a heart attack in March and was not a candidate for a CABG but 6 stents were placed. He was hospitalized for an extended period of time which was complicated by severe agitation and very combative behavior that has continued since then. His son says that he does not do well in hospitals at all and is very difficult to sedate. He and his wife want to place him in a nursing home following this hospitalization because they can't take care of him in this state. He says that he urinates all over the house and is agitated and wakes up at night frequently. He currently smokes 2 packs a day. His son denies any known CHF but he is taking lasix. Past Medical History:  
Diagnosis Date  CAD (coronary artery disease)  Chronic obstructive pulmonary disease (HCC)   
 on 2 liters at home  Dementia [address] [address] [facility]  BRANDEN (obstructive sleep apnea) Past Surgical History:  
Procedure Laterality Date  CARDIAC SURG PROCEDURE UNLIST    
 stents x6 Family History Problem Relation Age of Onset  Heart Disease Father Social History Socioeconomic History  Marital status:  Spouse name: Not on file  Number of children: Not on file  Years of education: Not on file  Highest education level: Not on file Tobacco Use  Smoking status: Current Every Day Smoker Packs/day: 2.00  Smokeless tobacco: Never Used Substance and Sexual Activity  Alcohol use: Yes Alcohol/week: 7.0 standard drinks Types: 7 Cans of beer per week Other Topics Concern Prior to Admission medications Medication Sig Start Date End Date Taking? Authorizing Provider  
clopidogreL (Plavix) 75 mg tab Take 75 mg by mouth daily. Indications: stent placement   Yes Other, MD Evelin  
potassium chloride (KLOR-CON) 10 mEq tablet Take 10 mEq by mouth daily. Yes Other, MD Evelin  
tamsulosin (Flomax) 0.4 mg capsule Take 0.4 mg by mouth. Indications: enlarged prostate with urination problem   Yes Other, MD Evelin  
doxycycline (ADOXA) 100 mg tablet Take 100 mg by mouth two (2) times a day. Yes Other, MD Evelin  
phenytoin ER (Dilantin Extended) 100 mg ER capsule Take 100 mg by mouth two (2) times a day. Indications: epilepsy   Yes Other, MD Evelin  
furosemide (Lasix) 40 mg tablet Take 40 mg by mouth daily. Indications: visible water retention   Yes Other, MD Evelin  
 
 
No Known Allergies Review of Systems Unable to obtain due to pt condition Physical Exam:  
 
Physical Exam: 
Visit Vitals /70 Pulse (!) 113 Temp 97.3 °F (36.3 °C) Resp (!) 33 Ht 5' 8\" (1.727 m) Wt 78.7 kg (173 lb 9.6 oz) SpO2 (!) 87% BMI 26.40 kg/m² O2 Flow Rate (L/min): 2 l/min O2 Device: Nasal cannula Temp (24hrs), Av.7 °F (36.5 °C), Min:97.3 °F (36.3 °C), Max:98.1 °F (36.7 °C) 
  1901 -  0700 In: 48 [I.V.:50] Out: -    No intake/output data recorded.  
 
General:  Awake, agitated and combative, tachypneic and using accessory muscles to breathe. A&Ox2 (person, year) Head:  Normocephalic, without obvious abnormality, atraumatic. Eyes:  Conjunctivae/corneas clear, sclera anicteric. Neck: Supple, symmetrical, trachea midline. Lungs:   Slight wheezing in all lung fields Heart:  Regular rate and rhythm, S1, S2 normal, no murmur, click, rub or gallop. Abdomen: Soft, non-tender. Bowel sounds normal. No masses,  No organomegaly. Extremities: Extremities normal, atraumatic, no cyanosis or edema. Capillary refill normal.  
Pulses: 2+ and symmetric all extremities. Skin: Skin color pink, turgor normal. No rashes or lesions Neurologic: No focal motor or sensory deficit. Labs Reviewed: All lab results for the last 24 hours reviewed. Recent Results (from the past 24 hour(s)) EKG, 12 LEAD, INITIAL Collection Time: 09/28/20  8:55 PM  
Result Value Ref Range Ventricular Rate 93 BPM  
 Atrial Rate 93 BPM  
 P-R Interval 144 ms QRS Duration 92 ms Q-T Interval 368 ms QTC Calculation (Bezet) 457 ms Calculated P Axis 50 degrees Calculated R Axis -4 degrees Calculated T Axis 53 degrees Diagnosis Poor data quality, interpretation may be adversely affected Normal sinus rhythm Low voltage QRS Cannot rule out Anterior infarct , age undetermined Abnormal ECG No previous ECGs available Confirmed by Abril Prabhakar MD. ([id number]) on 9/28/2020 9:54:49 PM 
  
CBC WITH AUTOMATED DIFF Collection Time: 09/28/20  9:02 PM  
Result Value Ref Range WBC 12.0 4.6 - 13.2 K/uL  
 RBC 4.24 (L) 4.70 - 5.50 M/uL  
 HGB 13.3 13.0 - 16.0 g/dL HCT 39.9 36.0 - 48.0 % MCV 94.1 74.0 - 97.0 FL  
 MCH 31.4 24.0 - 34.0 PG  
 MCHC 33.3 31.0 - 37.0 g/dL  
 RDW 14.5 11.6 - 14.5 % PLATELET 594 904 - 585 K/uL MPV 8.8 (L) 9.2 - 11.8 FL  
 NEUTROPHILS 82 (H) 40 - 73 % LYMPHOCYTES 11 (L) 21 - 52 % MONOCYTES 7 3 - 10 % EOSINOPHILS 0 0 - 5 % BASOPHILS 0 0 - 2 %  
 ABS. NEUTROPHILS 9.8 (H) 1.8 - 8.0 K/UL ABS. LYMPHOCYTES 1.3 0.9 - 3.6 K/UL  
 ABS. MONOCYTES 0.8 0.05 - 1.2 K/UL  
 ABS. EOSINOPHILS 0.0 0.0 - 0.4 K/UL  
 ABS. BASOPHILS 0.0 0.0 - 0.1 K/UL  
 DF AUTOMATED PROTHROMBIN TIME + INR Collection Time: 09/28/20  9:02 PM  
Result Value Ref Range Prothrombin time 13.5 11.5 - 15.2 sec INR 1.0 0.8 - 1.2 PTT Collection Time: 09/28/20  9:02 PM  
Result Value Ref Range aPTT 33.0 23.0 - 36.4 SEC METABOLIC PANEL, COMPREHENSIVE Collection Time: 09/28/20  9:02 PM  
Result Value Ref Range Sodium 141 136 - 145 mmol/L Potassium 3.8 3.5 - 5.5 mmol/L Chloride 105 100 - 111 mmol/L  
 CO2 31 21 - 32 mmol/L Anion gap 5 3.0 - 18 mmol/L Glucose 94 74 - 99 mg/dL BUN 15 7.0 - 18 MG/DL Creatinine 0.72 0.6 - 1.3 MG/DL  
 BUN/Creatinine ratio 21 (H) 12 - 20 GFR est AA >60 >60 ml/min/1.73m2 GFR est non-AA >60 >60 ml/min/1.73m2 Calcium 8.8 8.5 - 10.1 MG/DL Bilirubin, total 0.3 0.2 - 1.0 MG/DL  
 ALT (SGPT) 16 16 - 61 U/L  
 AST (SGOT) 12 10 - 38 U/L Alk. phosphatase 113 45 - 117 U/L Protein, total 7.6 6.4 - 8.2 g/dL Albumin 3.5 3.4 - 5.0 g/dL Globulin 4.1 (H) 2.0 - 4.0 g/dL A-G Ratio 0.9 0.8 - 1.7 CARDIAC PANEL,(CK, CKMB & TROPONIN) Collection Time: 09/28/20  9:02 PM  
Result Value Ref Range CK - MB 2.9 <3.6 ng/ml CK-MB Index 4.1 (H) 0.0 - 4.0 % CK 71 39 - 308 U/L Troponin-I, QT 0.02 0.0 - 0.045 NG/ML  
NT-PRO BNP Collection Time: 09/28/20  9:02 PM  
Result Value Ref Range NT pro-BNP 1,571 0 - 1,800 PG/ML  
POC G3 Collection Time: 09/28/20  9:37 PM  
Result Value Ref Range Device: NASAL CANNULA Flow rate (POC) 2 L/M  
 FIO2 (POC) 0.28 % pH (POC) 7.40 7.35 - 7.45    
 pCO2 (POC) 41.1 35.0 - 45.0 MMHG  
 pO2 (POC) 74 (L) 80 - 100 MMHG  
 HCO3 (POC) 25.8 22 - 26 MMOL/L  
 sO2 (POC) 95 92 - 97 % Base excess (POC) 1 mmol/L Allens test (POC) N/A Total resp. rate 32 Site RIGHT RADIAL Patient temp. 98.1 Specimen type (POC) ARTERIAL Performed by Kathrin Chappell SARS-COV-2 Collection Time: 09/29/20  1:15 AM  
Result Value Ref Range SARS-CoV-2 PENDING Specimen source Nasopharyngeal    
 Specimen type NP Swab Results CTA CHEST W OR W WO CONT (Accession [id number]) (Order [id number]) Allergies     
No Known Allergies Exam Information Status  Exam Begun   Exam Ended Final [99]  9/28/2020  23:17  9/28/2020  23:34 Result Information Status: Final result (Exam End: 9/28/2020 23:34)  Provider Status: Open Study Result EXAM: CTA chest 
  
INDICATION: Respiratory distress. 
  
COMPARISON: None 
  
TECHNIQUE: Axial CT imaging from the thoracic inlet through the diaphragm with 
intravenous contrast. Coronal and sagittal MIP reformats were generated. Dose 
reduction techniques used: automated exposure control, adjustment of the mAs 
and/or kVp according to patient size, and iterative reconstruction techniques. Digital imaging and communications in Medicine (DICOM) format image data are 
available to nonaffiliated external healthcare facilities or entities on a 
secure, media free, reciprocally searchable basis with patient authorization for 
at least 12 months after this study. 
  
_______________ 
  
FINDINGS: 
  
EXAM QUALITY: Non-diagnostic/Adequate/Excellent 
  
PULMONARY ARTERIES: No evidence of pulmonary embolism. 
  
MEDIASTINUM: Normal heart size. No evidence of right heart strain. Aorta is 
unremarkable. No pericardial effusion. 
  
LUNGS: There is bilateral bronchial thickening and lower lung field interstitial 
coarsening and faint lower lung field opacities. 
  
PLEURA: Normal. 
  
AIRWAY: Normal. 
  
LYMPH NODES: No enlarged nodes. 
  
UPPER ABDOMEN: There is bilateral adrenal gland thickening. 
  
OTHER: No acute or aggressive osseous abnormalities identified. 
  
_______________ 
  
IMPRESSION IMPRESSION: 
  
1. No evidence of pulmonary embolism. 2.  Bilateral bronchial thickening possibly related to bronchitis. 3. There is also bilateral lower lung field interstitial coarsening and faint 
lung opacities which may be related to hypoaeration. Edema or developing 
infiltrates considered less likely. 
   
 
Results XR CHEST PORT (Accession [id number]) (Order [id number]) Allergies     
No Known Allergies Exam Information Status  Exam Begun   Exam Ended Final [99]  9/28/2020  21:07  9/28/2020  21:23 Result Information Status: Final result (Exam End: 9/28/2020 21:23)  Provider Status: Open Study Result EXAM:  AP Portable Chest X-ray 1 view  
  INDICATION: Shortness of breath 
  
COMPARISON: None 
  
_______________ 
  
FINDINGS:  Heart size appears enlarged partly due to AP magnification and 
mediastinal contours are within normal limits for portable radiograph. There are 
increased interstitial lung markings suggesting mild pulmonary edema with 
prominence of the central pulmonary vasculature. There are no pleural effusions. No acute osseous findings. 
  
________________ 
  
  
IMPRESSION IMPRESSION: Increased interstitial lung markings and prominence of the central 
pulmonary vasculature suggesting mild pulmonary edema.

## 2020-09-29 NOTE — ED NOTES
Pt was agitated (sundowning) and removed his nasal cannula, refused to leave oxygen in until he calmed down about 4 minutes later when it was placed back on and accepted by the patient

## 2020-09-29 NOTE — PROGRESS NOTES
Reason for Admission:   C/o SOB 
               
RUR Score:  15 PCP: First and Last name:  Non listed on Facesheet Name of Practice:  
 Are you a current patient: Yes/No:  
 Approximate date of last visit:  
 Can you participate in a virtual visit if needed: Do you (patient/family) have any concerns for transition/discharge? Plan for utilizing home health:    
 
Current Advanced Directive/Advance Care Plan:  Pt listed as DNR Transition of Care Plan:  Chart reviewed, per ED notes pt arrived to ED with c/o SOB, Son reports pt recently moved to area from [place], pt uses home 02 @2L prn, while in ED RRT was called pt was placed on High Flow 02,pt admitted to ICU for medical management  Hx includes COPD,MI,CAD,BRANDEN,alcohol abuse,dementia, palliative on case, noted cm consult for LTC placement, at this time pt not medically stable cm will cont to review for improvement,will allow family time to take everything in and will meet at another time but will remain available if needed contact number Mable Moise. Care Management Interventions PCP Verified by CM: Yes 
Palliative Care Criteria Met (RRAT>21 & CHF Dx)?: Yes Current Support Network: Other

## 2020-09-29 NOTE — ED NOTES
Dr. Gris Schultz and rapid response called.  at bedside and respiratory placed patient on high flow o2.

## 2020-09-29 NOTE — PROGRESS NOTES
[id number] patient arrived from ED Via stretcher 
 
0945 Bedside shift change report received from ALEXANDRO Caban RN. Report included the following information SBAR, ED Summary, MAR, Recent Results and Cardiac Rhythm SA.  
 
 
1800 patient still AMS, unable to complete required admission documentation

## 2020-09-29 NOTE — PROGRESS NOTES
Called by nurse and ER doctor that patient deteriorating Arrived at bedside He is delerious and wheezing Elderly WM with hypertension but sats on %, not cyanotic, arouses to voice and moves all extremities Received sedatives for combativeness already ABG done, Pc02 54, 7.34 PH Ordered second dose lasix, as CXR had shown edema, give steroids, and rapid covid approved/done Admit to ICU, nursing made aware, they have beds, and also intensivist/pulmonologist consult-notified High flow in place for him, low threshold for intubation Send UDS, haldol PRN Restraints to protect lines and 02 RRT cancelled by me as appropriate nursing and resp therapist in room, bloodwork just sent in ER, and ABG being done On tele monitor Fitzgerald insertion Discussed with nursing in ER where he is holding

## 2020-09-29 NOTE — ED NOTES
Renetta Weinstein Ser cell [phone], work is [phone] and ask for Hugo Rich when calling work number

## 2020-09-29 NOTE — ED NOTES
This RN went into room to obtain lactic acid. Patient noted to be tachypneic with increased work of breathing. Patient with decreased responsiveness with sternal rub. Patient noted to have nasal cannula in mouth. Patient repositioned in the bed and nasal cannula repositioned. Patient agitated upon touching patient. O2 sat noted to be 84% and pulse ox applied to the finger.

## 2020-09-29 NOTE — PROGRESS NOTES
Speech-Language Pathology 0910, 1150 ST attempt x2 to complete b/s swallow evaluation. Patient is on high flow NC at this time, and is not arousable. RR currently at 33. D/w RN, Angel Hernández, patient is not safe/appropriate for PO attempts at this time to complete exam.  ST f/u as patient able. Thank you. MILTON Morgan.Ed, [id number] [facility]

## 2020-09-30 NOTE — PROGRESS NOTES
9110- Bedside and Verbal shift change report given to Dung Quinn RN (oncoming nurse) by Claude Bond RN (offgoing nurse). Report included the following information SBAR, Kardex, MAR and Recent Results. 1015- Patient to stat CT of head without contrast  
 
1635- TRANSFER - OUT REPORT: 
 
Verbal report given to (name) on uLcie Wynn  being transferred to (unit) for routine progression of care Report consisted of patients Situation, Background, Assessment and  
Recommendations(SBAR). Information from the following report(s) SBAR, Kardex, [facility] and Recent Results was reviewed with the receiving nurse. Lines:  
Peripheral IV 09/28/20 Right Antecubital (Active) Site Assessment Clean, dry, & intact 09/30/20 0349 Phlebitis Assessment 0 09/30/20 0349 Infiltration Assessment 0 09/30/20 0349 Dressing Status Clean, dry, & intact 09/30/20 0349 Dressing Type Transparent 09/30/20 0494 Hub Color/Line Status Capped 09/30/20 0349 Action Taken Open ports on tubing capped 09/29/20 1000 Alcohol Cap Used Yes 09/29/20 1000 Peripheral IV 09/29/20 Left;Upper Arm (Active) Site Assessment Clean, dry, & intact 09/30/20 0349 Phlebitis Assessment 0 09/30/20 0349 Infiltration Assessment 0 09/30/20 0349 Dressing Status Clean, dry, & intact 09/30/20 0349 Dressing Type Transparent 09/30/20 4290 Hub Color/Line Status Infusing 09/30/20 0349 Opportunity for questions and clarification was provided. Patient transported with: 
 Monitor Registered Nurse

## 2020-09-30 NOTE — PROGRESS NOTES
Problem: Self Care Deficits Care Plan (Adult) Goal: *Acute Goals and Plan of Care (Insert Text) Description: Initial Occupational Therapy Goals (9/30/2020) Within 7 day(s): 1. Patient will perform grooming seated EOB with setup x 10 minutes for increased independence with ADLs. 2. Patient will perform UB dressing with setup/Abdulaziz for increased independence with ADLs. 3. Patient will perform LB dressing with Abdulaziz & A/E PRN for increased independence with ADLs. 4. Patient will perform all aspects of toileting with minimal A for increased independence in ADLs 5. Patient will independently apply energy conservation techniques with 1 verbal cue(s) for increased independence with ADLs. 6. Patient will utilize good body mechanics during ADLs with 1 verbal cue(s). 7. Patient will perform functional transfer with minimal assist in preparation for ADLs. Outcome: Progressing Towards Goal 
  
OCCUPATIONAL THERAPY EVALUATION Patient: Kita Oneal (50 y.o. male) Date: 9/30/2020 Primary Diagnosis: COPD exacerbation ([facility] [address] [J44.1] Acute respiratory distress [R06.03] Precautions:  Aspiration, Fall, DNR 
PLOF: Patient reports independent w/o AD, but pt is not fully oriented; chart review reports pt living w/ son and recently moving here from [place]; using 2L home O2 
 
ASSESSMENT : 
Based on the objective data described below, the patient presents with significant decline. Pt was aggressive last night w/ nursing staff w/ h/o dementia and ETOH abuse, but no notations of CIWA. Pt was pleasant and cooperative. Pt requiring increased time to process commands. Reports he is in the hospital. Pt soft-spoken. Pt able to wash face and assist w/ sock donning, but limited by decreased ROM. S/p gentle ROM, improvement in functional reach to BLE's. Early termination d/t pt being transported to CT.  
 
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 Atrophy with chronic ischemic changes] Education: Patient instructed on home safety, body mechanics for optimal respiratory effort, importance of asking for assist w/o attempts to get OOB unassisted, Energy Conservation/Work Simplification Techniques, adaptive strategies and adaptive dressing techniques including clothing modifications with patient verbalizing understanding at this time. Patient will benefit from skilled intervention to address the above impairments. Patient's rehabilitation potential is considered to be Good Factors which may influence rehabilitation potential include:  
[]             None noted [x]             Mental ability/status [x]             Medical condition []             Home/family situation and support systems [x]             Safety awareness []             Pain tolerance/management 
[]             Other: PLAN : 
Recommendations and Planned Interventions:  
[x]               Self Care Training                  [x]      Therapeutic Activities [x]               Functional Mobility Training   [x]      Cognitive Retraining 
[x]               Therapeutic Exercises           [x]      Endurance Activities [x]               Balance Training                    [x]      Neuromuscular Re-Education [x]               Visual/Perceptual Training     [x]      Home Safety Training 
[x]               Patient Education                   [x]      Family Training/Education []               Other (comment): Frequency/Duration: Patient will be followed by occupational therapy 1-2 times per day/2-4 days per week to address goals. Discharge Recommendations: Rehab Further Equipment Recommendations for Discharge: To Be Determined (TBD) at next level of care SUBJECTIVE:  
Patient stated hospital. OBJECTIVE DATA SUMMARY:  
 
Past Medical History:  
Diagnosis Date  CAD (coronary artery disease)  Chronic obstructive pulmonary disease (HCC)   
 on 2 liters at home  Dementia ([facility] [address]  BRANDEN (obstructive sleep apnea) Past Surgical History:  
Procedure Laterality Date  CARDIAC SURG PROCEDURE UNLIST    
 stents x6 Barriers to Learning/Limitations: yes;  cognitive and sensory deficits-vision/hearing/speech Compensate with: visual, verbal, tactile, kinesthetic cues/model Home Situation:  
Home Situation Living Alone: No 
Support Systems: Child(noreen)(son) Current DME Used/Available at Home: Oxygen, portable(2L) [x]  Right hand dominant   []  Left hand dominant Cognitive/Behavioral Status: 
Neurologic State: Alert;Confused Orientation Level: Disoriented to situation;Disoriented to time;Oriented to person;Oriented to place Cognition: Decreased attention/concentration; Follows commands;Memory loss;Poor safety awareness Safety/Judgement: Awareness of environment;Decreased awareness of need for assistance;Decreased awareness of need for safety;Decreased insight into deficits Skin: thin, dry Edema: minimal foot edema Vision/Perceptual:   
   Appears grossly [facility] Coordination: BUE Coordination: Generally decreased, functional 
Fine Motor Skills-Upper: Left Intact; Right Intact Gross Motor Skills-Upper: Left Intact; Right Intact Balance: 
Sitting: Impaired Strength: BUE Strength: Generally decreased, functional 
 
Tone & Sensation: BUE Tone: Normal 
Sensation: Intact Range of Motion: BUE 
AROM: Generally decreased, functional 
PROM: Generally decreased, functional 
 
Functional Mobility and Transfers for ADLs: 
Bed Mobility: 
Supine to Sit: Moderate assistance ADL Assessment:  
Oral Facial Hygiene/Grooming: Contact guard assistance;Minimum assistance; Additional time Bathing: Maximum assistance Upper Body Dressing: Moderate assistance Lower Body Dressing: Maximum assistance; Additional time Toileting: Maximum assistance; Total assistance ADL Intervention: Grooming Washing Face: Contact guard assistance;Minimum assistance Lower Body Dressing Assistance Socks: Maximum assistance; Compensatory technique training Cognitive Retraining Problem Solving: Inductive reason; Identifying the task; Identifying the problem;General alternative solution;Deductive reason; Awareness of environment Executive Functions: Executing cognitive plans;Managing time;Regulating behavior Organizing/Sequencing: Breaking task down;Prioritizing Attention to Task: Distractibility; Single task Following Commands: Awareness of environment Safety/Judgement: Awareness of environment;Decreased awareness of need for assistance;Decreased awareness of need for safety;Decreased insight into deficits Cues: Tactile cues provided;Verbal cues provided;Visual cues provided Therapeutic Exercise: 
Gentle PROM in prep for functional reach for LE dressing Pain: 
Pain level pre-treatment: 2/10  FACES Pain level post-treatment: 2/10 Pain Intervention(s): Medication provided by Nursing (see MAR); Rest, Ice, Repositioning Response to intervention: Nurse notified, See doc flow sheet Please refer to the flowsheet for vital signs taken during this treatment. After treatment:  
[] Patient left in no apparent distress sitting up in chair 
[x] Patient left in no apparent distress in bed 
[x] Call bell left within reach [x] Nursing notified/Yuridia [x] Caregiver present/transport 
[] Bed alarm activated COMMUNICATION/EDUCATION:  
[x] Role of Occupational Therapy in the acute care setting 
[x] Home safety education was provided and the patient/caregiver indicated understanding. [x] Patient/family have participated as able in goal setting and plan of care. [x] Patient/family agree to work toward stated goals and plan of care. [] Patient understands intent and goals of therapy, but is neutral about his/her participation. [] Patient is unable to participate in goal setting and plan of care. Thank you for this referral. 
Milagros Martins Serum, OTR/L, CSRS, CDCS, CFPS Time Calculation: 14 mins Eval Complexity: History: HIGH Complexity : Extensive review of history including physical, cognitive and psychosocial history ; Examination: HIGH Complexity : 5 or more performance deficits relating to physical, cognitive , or psychosocial skils that result in activity limitations and / or participation restrictions; Decision Making:HIGH Complexity : Patient presents with comorbidities that affect occupational performance. Signifigant modification of tasks or assistance (eg, physical or verbal) with assessment (s) is necessary to enable patient to complete evaluation

## 2020-09-30 NOTE — PROGRESS NOTES
Hospitalist Progress Note Patient: Twylla Severe MRN: [id number]  CSN: [id number] YOB: 1940  Age: 68 y.o. Sex: male DOA: 9/28/2020 LOS:  LOS: 1 day Assessment/Plan Patient Active Problem List  
Diagnosis Code  Tobacco dependence F17.200  Chronic obstructive pulmonary disease with acute exacerbation (HCC) J44.1  Supplemental oxygen dependent Z99.81  
 Suspected 2019 novel coronavirus infection Z20.828  
 Hypoxia R09.02  
 CAD (coronary artery disease) I25.10  Combative behavior R46.89  
 Alcohol abuse F10.10  Dementia ([facility] [address] F03.90  
 BRANDEN (obstructive sleep apnea) G47.33  
 Acute respiratory distress R06.03  
 Acute respiratory failure with hypoxia and hypercapnia (HCC) J96.01, J96.02  
 Altered mental status R41.82  
  
 
 
 
67 y/o male with COPD (2L supplemental oxygen dependent), BRANDEN, CAD, alcohol abuse, and tobacco dependence is admitted with a COPD exacerbation, hypoxia. He required to be started on high flow oxygen and ICU admission. He is now on oxygen by nasal cannula, answering to questions. COVID 19 rapid test negative. Acute on chronic respiratory failure with hypoxia- 
Secondary to COPD exacerbation, now improved, he is on oxygen by nasal cannula. CTA chest negative for PE, bronchial wall thickening and possible basilar atelectasis versus hypoinflation. Could not tolerate BiPAP in ER due to sedation/dementia. COPD exacerbation- 
New with IV Solu-Medrol, duo nebs, Pulmicort. Continue with empiric antibiotics. Dysphagia- 
Seen by SLP, recommend n.p.o., for MBS. CAD- No chest pain, history of stent placement. Echo with EF of 55 to 63%, grade 1 diastolic dysfunction. Medications on hold secondary to dysphagia and n.p.o. Alcohol use- 
Continue with CIWA protocol and banana bag. Tobacco abuse- 
Nicotine patch Continue with Dilantin, change dose to IV. Elevated blood sugar secondary to steroid use, continue with sliding scale insulin. DVT prophylaxis with Lovenox, Disposition : TBD Review of systems General: No fevers or chills. Cardiovascular: No chest pain or pressure. No palpitations. Pulmonary: see above. Gastrointestinal: No nausea, vomiting. Physical Exam: 
General: Awake, cooperative, no acute distress   
HEENT: NC, Atraumatic. PERRLA, anicteric sclerae. Lungs: CTA Bilaterally. No Wheezing/Rhonchi/Rales. Heart:  S1 S2,  No murmur, No Rubs, No Gallops Abdomen: Soft, Non distended, Non tender.  +Bowel sounds, Extremities: No c/c/e Psych:   Not anxious or agitated. Neurologic:  No acute neurological deficit. Vital signs/Intake and Output: 
Visit Vitals BP (!) 144/75 (BP 1 Location: Left arm, BP Patient Position: At rest) Pulse 93 Temp 97.6 °F (36.4 °C) Resp 24 Ht 5' 8\" (1.727 m) Wt 78.5 kg (173 lb) SpO2 97% BMI 26.30 kg/m² Current Shift:  No intake/output data recorded. Last three shifts:  09/29 0701 - 09/30 1900 In: 1060 [I.V.:1060] Out: 2750 [Urine:2750] Labs: Results:  
   
Chemistry Recent Labs  
  09/30/20 0445 09/28/20 2102 * 94  141  
K 4.0 3.8  105 CO2 30 31 BUN 21* 15  
CREA 0.83 0.72  
CA 8.8 8.8 AGAP 8 5 BUCR 25* 21* AP 85 113  
TP 7.6 7.6 ALB 4.2 3.5 GLOB 3.4 4.1* AGRAT 1.2 0.9  
  
CBC w/Diff Recent Labs  
  09/30/20 0445 09/28/20 2102 WBC 16.1* 12.0  
RBC 4.09* 4.24* HGB 12.3* 13.3 HCT 38.9 39.9  374 GRANS  --  82* LYMPH  --  11* EOS  --  0 Cardiac Enzymes Recent Labs  
  09/29/20 
1504 09/28/20 2102  71 CKND1 2.3 4.1* Coagulation Recent Labs  
  09/28/20 
2102 PTP 13.5 INR 1.0 APTT 33.0 Lipid Panel No results found for: CHOL, CHOLPOCT, CHOLX, CHLST, CHOLV, 251016, HDL, HDLP, LDL, LDLC, DLDLP, [id number], VLDLC, VLDL, TGLX, TRIGL, TRIGP, TGLPOCT, CHHD, CHHDX BNP No results for input(s): BNPP in the last 72 hours. Liver Enzymes Recent Labs  
  09/30/20 
0445 TP 7.6 ALB 4.2 AP 85 Thyroid Studies No results found for: T4, T3U, TSH, TSHEXT, TSHEXT Procedures/imaging: see electronic medical records for all procedures/Xrays and details which were not copied into this note but were reviewed prior to creation of Plan

## 2020-09-30 NOTE — PROGRESS NOTES
Problem: Dysphagia (Adult) Goal: *Acute Goals and Plan of Care (Insert Text) Description: Recommendations: 
Diet: NPO Meds: Non-oral 
Aspiration Precautions Oral Care TID Goals:  Patient will: 1. Tolerate PO trials with 0 s/s overt distress in 4/5 trials 2. Utilize compensatory swallow strategies/maneuvers (decrease bite/sip, size/rate, alt. liq/sol) with min cues in 4/5 trials 3. Perform oral-motor/laryngeal exercises to increase oropharyngeal swallow function with min cues 4. Complete an objective swallow study (i.e., MBSS) to assess swallow integrity, r/o aspiration, and determine of safest LRD, min A Outcome: Progressing Towards Goal 
 
SPEECH LANGUAGE PATHOLOGY BEDSIDE SWALLOW EVALUATION Patient: Neelima Irvin (12 y.o. male) Date: 9/30/2020 Primary Diagnosis: COPD exacerbation ([facility] [address] [J44.1] Acute respiratory distress [R06.03] Precautions:  Aspiration, Fall, DNR 
PLOF: Independent ASSESSMENT : 
Based on the objective data described below, the patient presents with mild-moderate oral and suspected severe pharyngeal dysphagia. A&Ox2. Oral-motor exam revealed pt edentulous and with lingual and labial weakness. Patient presented with ice chip trials x2; demo delayed oral bolus manipulation, decreased hyolaryngeal elevation, immediate wet cough, facial redness, and increased RR. No further trials presented due to high risk of aspiration and current respiratory status. Recommend patient remain NPO with strict aspiration precautions. Consider pleasure feeding vs alternate source of nutrition/hydration. D/w RN, Denia Gillespie and Dr. Madison Patricio. SLP will continue to follow as indicated. Patient will benefit from skilled intervention to address the above impairments. Patient's rehabilitation potential is considered to be Guarded Factors which may influence rehabilitation potential include:  
[]            None noted [x]            Mental ability/status [x]            Medical condition []            Home/family situation and support systems [x]            Safety awareness 
[]            Pain tolerance/management []            Other: PLAN : 
Recommendations and Planned Interventions: NPO Frequency/Duration: Patient will be followed by speech-language pathology 1-2 times per day/4-7 days per week to address goals. Discharge Recommendations: To Be Determined SUBJECTIVE:  
Patient stated Come back soon. OBJECTIVE:  
 
Past Medical History:  
Diagnosis Date CAD (coronary artery disease) Chronic obstructive pulmonary disease (HCC)   
 on 2 liters at home Dementia ([address] BRANDEN (obstructive sleep apnea) Past Surgical History:  
Procedure Laterality Date CARDIAC SURG PROCEDURE UNLIST    
 stents x6 Home Situation:  
Home Situation Living Alone: No 
Support Systems: Child(noreen)(son) Current DME Used/Available at Home: Oxygen, portable(2L) Diet prior to admission: Regular/thin Current Diet:  NPO Cognitive and Communication Status: 
Neurologic State: Alert Orientation Level: Oriented to person, Oriented to place Cognition: Follows commands Perception: Appears intact Perseveration: No perseveration noted Safety/Judgement: Awareness of environment Oral Assessment: 
Oral Assessment Labial: No impairment Dentition: Edentulous Oral Hygiene: [address] Lingual: Decreased rate;Decreased strength Velum: No impairment Mandible: No impairment P.O. Trials: 
Patient Position: TBN 50 Vocal quality prior to P.O.: Low volume Consistency Presented: Ice chips How Presented: SLP-fed/presented;Spoon Bolus Acceptance: No impairment Bolus Formation/Control: Impaired Type of Impairment: Delayed;Mastication Propulsion: Delayed (# of seconds) Oral Residue: None Initiation of Swallow: Delayed (# of seconds) Laryngeal Elevation: Weak Aspiration Signs/Symptoms: Change vocal quality; Increase in RR;Strong cough; Facial redness Pharyngeal Phase Characteristics: Altered vocal quality; Audible swallow; Poor endurance Effective Modifications: None Cues for Modifications: Moderate Oral Phase Severity: Mild-moderate Pharyngeal Phase Severity : Severe PAIN: 
Pain level pre-treatment: 0/10 Pain level post-treatment: 0/10 After treatment:  
[]            Patient left in no apparent distress sitting up in chair 
[x]            Patient left in no apparent distress in bed 
[x]            Call bell left within reach [x]            Nursing notified []            Family present 
[]            Caregiver present 
[]            Bed alarm activated COMMUNICATION/EDUCATION:  
[x]            Aspiration precautions; swallow safety; compensatory techniques. [x]            Patient/family have participated as able in goal setting and plan of care. []            Patient/family agree to work toward stated goals and plan of care. []            Patient understands intent and goals of therapy; neutral about participation. []            Patient unable to participate in goal setting/plan of care; educ ongoing with interdisciplinary staff 
[]         Posted safety precautions in patient's room. Thank you for this referral, Jonathan Han SLP Time Calculation: 11 mins

## 2020-09-30 NOTE — PROGRESS NOTES
2002- Bedside and Verbal shift change report given to [address] (oncoming nurse) by Janice Bateman (offgoing nurse). Report included the following information SBAR, Kardex, Intake/Output, MAR and Recent Results. 0030- attempted to apply condom catheter unsuccessfully due to inability to stay secure 
 
0209- Pt states \"I am having difficulty breathing\", pt O2 sat 88%, lungs crackles and diminished bilaterally, labored breathing, HOB at 30 degrees and on NC @ 4 L. NC titrated to 5L w, O2 sat now 91%. Respiratory paged for PRN Duo-Neb tx 
 
0473- Duo-Neb tx completed. Pt exhibiting labored breathing, lungs crackles and diminished, resp rate 35, O2 91% on 5L via NC. Pt states \"It helped a little\". Dr. Pop Madera notified 
 
4178- Orders received for ABG draw, portable chest XR, 40 mg Lasix IV, and to transfer pt to tele.

## 2020-09-30 NOTE — PROGRESS NOTES
1930- Report and care received, assessment completed per flow sheet. Drowsy, confused to situation and time. Soft bilateral wrist restraints in place to protect integrity of lines/tubes, flow sheet in progress. Tolerating high flow nasal cannula. 2056- Agitated, throwing pillow and legs over the rail. Haldol administered per orders. 2130- NAD, resting with eyes closed. 0000- Reassessment completed and without change. NAD. 9456- Reassessment completed and without change.

## 2020-09-30 NOTE — PROGRESS NOTES
Pulmonary Specialists Pulmonary, Critical Care, and Sleep Medicine Name: Megan Miur MRN: [id number] : 1940 Hospital: [facility] [place] Date: 2020  Room: 103/[address] [place] [address] Note Consult requesting physician: Dr. Bea Chapman Reason for Consult: COPD exacerbation IMPRESSION:  
Acute respiratory failure with hypoxia and hypercapnia (HCC) J96.01, J96.02 Chronic obstructive pulmonary disease with acute exacerbation (HCC) J44.1 Altered mental status R41.82 Dementia ([facility] [address] F03.90 · · Patient Active Problem List  
Diagnosis Code  Tobacco dependence F17.200  Chronic obstructive pulmonary disease with acute exacerbation (HCC) J44.1  Supplemental oxygen dependent Z99.81  
 Suspected 2019 novel coronavirus infection Z20.828  
 Hypoxia R09.02  
 CAD (coronary artery disease) I25.10  Combative behavior R46.89  
 Alcohol abuse F10.10  [facility] F03.90  
 BRANDEN (obstructive sleep apnea) G47.33  
 Acute respiratory distress R06.03  
 Acute respiratory failure with hypoxia and hypercapnia (HCC) J96.01, J96.02  
 Altered mental status R41.82  
 
 
 
· Code status: DNR/DNI  
  
RECOMMENDATIONS:  
Respiratory: Hx COPD, chronic hypoxic respiratory failure on home O2, active smoker, BRANDEN intolerant to CPAP. Admitted with acute on chronic hypoxic and hypercapnic respiratory failure, COPD exacerbation. CTA chest: Negative for PE. Bronchial wall thickening and possible basilar atelectasis versus hypoinflation. Could not tolerate BiPAP in ER due to agitation/dementia. Initially required HF NC. 
DNR/DNI status. HFNC changed to now 4 LPM NC. Reduce Solu-Medrol to 40 mg IV every 6 hours. Continue nicotine patch. Linville dilators: DuoNeb 4 times daily, Pulmicort nebs twice daily, DuoNeb as needed. At high risk for aspiration. Keep n.p.o. and HOB elevated all the time. Keep SPO2 >=92%. HOB 30 degree elevation all the time. Aggressive pulmonary toileting. Aspiration precautions. Incentive spirometry. CVS: Hx MI in 03/2020, not a candidate for CABG, 6 stent placed. Echo 9/29/2020: LVEF 55 to 60%. Grade 1 DD. Continue Plavix, Nitro-Bid, Lipitor. ID:  
Rapid COVID19 negative. COVID19 9/29/2020: Pending. CTA chest with possible bronchitis. UA negative. Urine culture pending. Not able to collect the sputum sample. Lactic acid normal.  No fever or leukocytosis. Antibiotic: Continue Zosyn for acute bronchitis. Deescalate antibiotic when appropriate. Hematology/Oncology: No acute issues. Renal: Normal creatinine. Monitor electrolytes, renal function and urine output. GI/: Continue Flomax for BPH. Will DC Fitzgerald catheter and place condom catheter. Endocrine: Monitor BS. Monitor for any hypoglycemia. Neurology: Hx dementia. Admitted with severe agitation. Required Haldol in ER and last night. Start Seroquel 25 mg p.o. nightly. Check Dilantin level and change Dilantin to IV equivalent dose, by pharmacist. 
CT head ordered. Toxicology: Hx alcohol abuse. Banana bag daily. Continue to follow CIWA scoring and management. Pain/Sedation: No acute issues. Skin/Wound: No acute issues. Electrolytes: Replace electrolytes per ICU electrolyte replacement protocol. IVF: None. Nutrition: N.p.o.  Strict aspiration precautions. ST evaluation was not completed due to mental status yesterday. Speech therapist to follow. Prophylaxis: DVT Prophylaxis: SCD/Lovenox. GI Prophylaxis: Low risk for stress ulcer. [facility] Restraints: none Lines/Tubes: PIV Fitzgerald: Placed in ER 9/29/2020 -to be discontinued 9/30/2020 after placing condom catheter. Advance Directive/Palliative Care: consulted, discussed with palliative care team as well Will defer respective systems problem management to primary and other respective consultant and follow patient in ICU with primary and other medical team. 
Further recommendations will be based on the patient's response to recommended treatment and results of the investigation ordered. Quality Care: PPI, DVT prophylaxis, HOB elevated, Infection control all reviewed and addressed. Care of plan d/w hospitalist team, RN, RT, MDR. Waiting for son Lory Carmona to arrive at bedside later this morning. Will update him. High complexity decision making was performed during the evaluation of this patient at high risk for decompensation with multiple organ involvement. Total critical care time spent rendering care exclusive of procedures/family discussion/coordination of care: 35 minutes. Subjective/History of Present Illness:  
 
Patient is a 68 y.o. male with PMHx significant for dementia, CAD, alcohol abuse, tobacco dependence/smoker, BRANDEN intolerant with CPAP, chronic hypoxic respiratory failure on home O2, COPD; admitted with combative behavior likely due to dementia, COPD exacerbation, acute hypoxic and hypercapnic respiratory failure. Patient used to live other state, not able to take care of him and so recently moved to live with his son in [place]. He had MRI in 03/2020, not a candidate for CABG, had 6 stent placed, per history. He still an active smoker and alcohol abuser. Hx BRANDEN but intolerant to CPAP and not using CPAP. He is chronic hypoxic respiratory failure on home O2, but an active smoker. Patient admitted to ICU in altered mental status/severe agitation along with acute hypoxic and hypercapnic respiratory failure with COPD exacerbation, on HF NC. 
 
9/30/2020: 
Patient remained in ICU. HFNC changed to 4 LPM NC this morning. No fever or leukocytosis. Mental status improved. He is alert and awake and responsive to verbal commands. Moving all 4 extremities. Required 3 Haldol doses overnight for severe agitation. Hemodynamic stable. No cough noted. Respiratory status improved.   No dyspnea, prolonged expiration or wheezing. [facility] was not called for any issues overnight. No other overnight issues reported. I/O last 24 hrs: Intake/Output Summary (Last 24 hours) at 9/30/2020 0909 Last data filed at 9/30/2020 0630 Gross per 24 hour Intake 960 ml Output 2550 ml Net -1590 ml The patient is critically ill and can not provide additional history due to Unable to comprehend History taken from patient's son Aaliyah Gilbert and  EMR Review of Systems: 
ROS not obtained due to patient factor. No Known Allergies Past Medical History:  
Diagnosis Date  CAD (coronary artery disease)  Chronic obstructive pulmonary disease (HCC)   
 on 2 liters at home  Dementia ([address]  BRANDEN (obstructive sleep apnea) Past Surgical History:  
Procedure Laterality Date  CARDIAC SURG PROCEDURE UNLIST    
 stents x6 Social History Tobacco Use  Smoking status: Current Every Day Smoker Packs/day: 2.00  Smokeless tobacco: Never Used Substance Use Topics  Alcohol use: Yes Alcohol/week: 7.0 standard drinks Types: 7 Cans of beer per week Family History Problem Relation Age of Onset  Heart Disease Father Prior to Admission medications Medication Sig Start Date End Date Taking? Authorizing Provider  
clopidogreL (Plavix) 75 mg tab Take 75 mg by mouth daily. Indications: stent placement   Yes Other, MD Evelin  
potassium chloride (KLOR-CON) 10 mEq tablet Take 10 mEq by mouth daily. Yes Evelin Hoover MD  
tamsulosin (Flomax) 0.4 mg capsule Take 0.4 mg by mouth. Indications: enlarged prostate with urination problem   Yes Kiko, MD Evelin  
doxycycline (ADOXA) 100 mg tablet Take 100 mg by mouth two (2) times a day. Yes Evelin Hoover MD  
phenytoin ER (Dilantin Extended) 100 mg ER capsule Take 100 mg by mouth two (2) times a day. Indications: epilepsy   Yes Kiko, MD Evelin  
furosemide (Lasix) 40 mg tablet Take 40 mg by mouth daily.  Indications: visible water retention   Yes Other, MD Evelin  
 
Current Facility-Administered Medications Medication Dose Route Frequency  insulin lispro (HUMALOG) injection   SubCUTAneous Q6H  
 sodium chloride (NS) flush 5-40 mL  5-40 mL IntraVENous Q8H  
 enoxaparin (LOVENOX) injection 40 mg  40 mg SubCUTAneous DAILY  nitroglycerin (NITROBID) 2 % ointment 0.5 Inch  0.5 Inch Topical BID  clopidogreL (PLAVIX) tablet 75 mg  75 mg Oral DAILY  phenytoin ER (DILANTIN ER) ER capsule 100 mg  100 mg Oral BID  tamsulosin (FLOMAX) capsule 0.4 mg  0.4 mg Oral DAILY  nicotine (NICODERM CQ) 21 mg/24 hr patch 1 Patch  1 Patch TransDERmal DAILY  albumin human 25% (BUMINATE) solution 25 g  25 g IntraVENous Q6H  
 methylPREDNISolone (PF) (Solu-MEDROL) injection 80 mg  80 mg IntraVENous Q6H  
 piperacillin-tazobactam (ZOSYN) 3.375 g in 0.9% sodium chloride (MBP/ADV) 100 mL MBP  3.375 g IntraVENous Q8H  
 atorvastatin (LIPITOR) tablet 40 mg  40 mg Oral DAILY  0.9% sodium chloride 1,000 mL with mvi, adult no. 4 with vit K 10 mL, thiamine 161 mg, folic acid 1 mg infusion   IntraVENous DAILY Objective:  
Vital Signs:   
Visit Vitals BP (!) 152/82 Pulse 89 Temp 98.5 °F (36.9 °C) Resp 28 Ht 5' 8\" (1.727 m) Wt 78.5 kg (173 lb) SpO2 94% BMI 26.30 kg/m² O2 Device: Hi flow nasal cannula O2 Flow Rate (L/min): 40 l/min Temp (24hrs), Av.4 °F (36.9 °C), Min:98.2 °F (36.8 °C), Max:98.7 °F (37.1 °C) Intake/Output:  
Last shift:      No intake/output data recorded. Last 3 shifts:  1901 -  0700 In: 1110 [I.V.:1110] Out: 2550 [Urine:2550] Intake/Output Summary (Last 24 hours) at 2020 0909 Last data filed at 2020 0630 Gross per 24 hour Intake 960 ml Output 2550 ml Net -1590 ml Last 3 Recorded Weights in this Encounter  
 20 1401 Weight: 78.7 kg (173 lb 9.6 oz) 78.5 kg (173 lb) Recent Labs  
  20 
0756 20 2137 PHI 7.37 7.40 PCO2I 54.0* 41.1 PO2I 193* 74* HCO3I 31.1* 25.8 FIO2I 100 0.28 Physical Exam:  
 
General/Neurology: Alert, awake and following simple commands. NAD. Nonfocal.  Moving all 4 extremities. Head:   NCAT. Eye:   EOM intact, no icterus/pallor/cyanosis. Nose:   No nasal drainage/discharge. Throat:  Moist mucosa. No oral thrush. Neck:   Trachea midline. No palpable cervical lymphadenopathy. Lung: Moderate air entry bilateral equal.  No rhonchi or wheezing. No prolonged expiration or accessory muscle use. No stridors. Heart:   S1 S2 present. No murmur or JVD. Abdomen:  Soft. NT. ND. No palpable masses. Extremities:  No edema, cyanosis or clubbing. Pulses: 2+ and symmetric in DP. Lymphatic:  No cervical or supraclavicular palpable lymphadenopathy. Data:  
   
Recent Results (from the past 24 hour(s)) GLUCOSE, POC Collection Time: 09/29/20 12:09 PM  
Result Value Ref Range Glucose (POC) 178 (H) 70 - 110 mg/dL ECHO ADULT COMPLETE Collection Time: 09/29/20  2:03 PM  
Result Value Ref Range IVSd 0.90 0.6 - 1.0 cm LVIDd 4.69 4.2 - 5.9 cm LVIDs 3.59 cm  
 LVOT d 2.01 cm  
 LVPWd 0.90 0.6 - 1.0 cm  
 LV Ejection Fraction MOD 4C 48 % LV ED Vol A4C 106.7 mL  
 LV ES Vol A4C 55.9 mL  
 LVOT Cardiac Output 4.2 L/min LVOT Peak Gradient 2.5 mmHg Left Ventricular Outflow Tract Mean Gradient 3.1845827 mmHg LVOT SV 42.4 ml  
 LVOT Peak Velocity 79.04 cm/s LVOT VTI 13.43 cm  
 LA Vol 4C 25.73 18 - 58 mL  
 AV Annulus 3.31 cm Aortic Valve Area by Continuity of Peak Velocity 1.7 cm2 Aortic Valve Area by Continuity of VTI 1.9 cm2 AoV PG 8.3 mmHg Aortic Valve Systolic Mean Gradient 3.8 mmHg Aortic Valve Systolic Peak Velocity 010.28 cm/s Aortic valve mean velocity 8.4345388 m/s AoV VTI 22.74 cm  
 AO ASC D 0.00 cm LV E' Septal Velocity 4.00 cm/s LV E' Lateral Velocity 9.00 cm/s  
 MV A Timur 125.00 cm/s MV E Timur 78.00 cm/s  
 MV E/A 0.62   
 RVOT Diameter 0.00 cm LA Area 4C 13.1 cm2 LV Mass .2 88 - 224 g LV Mass AL Index 74.0 49 - 115 g/m2 LVPWs 0.00 cm  
 E/E' lateral 8.67   
 E/E' septal 19.50 IVC proximal 1.73 cm  
 E/E' ratio (averaged) 14.08 Left Atrium Minor Axis 1.46 cm Left Atrium Major Axis 2.81 cm Tapse 1.87 1.5 - 2.0 cm  
 LA Vol Index 13.39 16 - 28 ml/m2 LVED Vol Index A4C 55.5 mL/m2 LVES Vol Index A4C 29.1 mL/m2 ARTIS/BSA Pk Timur 0.9 cm2/m2 ARTIS/BSA VTI 1.0 cm2/m2 Left Ventricular Fractional Shortening by 2D 53.151290261 % Left Ventricular Outflow Tract Mean Velocity 6.0681883 cm/s AV Velocity Ratio 0.55 AV VTI Ratio 0.6 Left Ventricular End Diastolic Volume by Teichholz Method 7.70741891179 mL Left Ventricular End Systolic Volume by Teichholz Method 5.66249705575 mL Left Ventricular Stroke Volume by Teichholz Method 37.839191428 mL Left Ventricular Stroke Volume by 2-D Single Plane- MOD 47.421957202 mL CARDIAC PANEL,(CK, CKMB & TROPONIN) Collection Time: 09/29/20  3:04 PM  
Result Value Ref Range CK - MB 2.6 <3.6 ng/ml CK-MB Index 2.3 0.0 - 4.0 %  39 - 308 U/L Troponin-I, QT 0.02 0.0 - 0.045 NG/ML  
GLUCOSE, POC Collection Time: 09/29/20  5:58 PM  
Result Value Ref Range Glucose (POC) 166 (H) 70 - 110 mg/dL METABOLIC PANEL, COMPREHENSIVE Collection Time: 09/30/20  4:45 AM  
Result Value Ref Range Sodium 143 136 - 145 mmol/L Potassium 4.0 3.5 - 5.5 mmol/L Chloride 105 100 - 111 mmol/L  
 CO2 30 21 - 32 mmol/L Anion gap 8 3.0 - 18 mmol/L Glucose 163 (H) 74 - 99 mg/dL BUN 21 (H) 7.0 - 18 MG/DL Creatinine 0.83 0.6 - 1.3 MG/DL  
 BUN/Creatinine ratio 25 (H) 12 - 20 GFR est AA >60 >60 ml/min/1.73m2 GFR est non-AA >60 >60 ml/min/1.73m2 Calcium 8.8 8.5 - 10.1 MG/DL  Bilirubin, total 0.5 0.2 - 1.0 MG/DL  
 ALT (SGPT) 14 (L) 16 - 61 U/L  
 AST (SGOT) 14 10 - 38 U/L  
 Alk. phosphatase 85 45 - 117 U/L Protein, total 7.6 6.4 - 8.2 g/dL Albumin 4.2 3.4 - 5.0 g/dL Globulin 3.4 2.0 - 4.0 g/dL A-G Ratio 1.2 0.8 - 1.7    
CBC W/O DIFF Collection Time: 09/30/20  4:45 AM  
Result Value Ref Range WBC 16.1 (H) 4.6 - 13.2 K/uL  
 RBC 4.09 (L) 4.70 - 5.50 M/uL  
 HGB 12.3 (L) 13.0 - 16.0 g/dL HCT 38.9 36.0 - 48.0 % MCV 95.1 74.0 - 97.0 FL  
 MCH 30.1 24.0 - 34.0 PG  
 MCHC 31.6 31.0 - 37.0 g/dL  
 RDW 14.3 11.6 - 14.5 % PLATELET 508 475 - 231 K/uL MPV 9.0 (L) 9.2 - 11.8 FL Chemistry Recent Labs  
  09/30/20 0445 09/28/20 2102 * 94  141  
K 4.0 3.8  105 CO2 30 31 BUN 21* 15  
CREA 0.83 0.72  
CA 8.8 8.8 AGAP 8 5 BUCR 25* 21* AP 85 113  
TP 7.6 7.6 ALB 4.2 3.5 GLOB 3.4 4.1* AGRAT 1.2 0.9 Lactic Acid Lactic acid Date Value Ref Range Status 09/29/2020 0.8 0.4 - 2.0 MMOL/L Final  
 
Recent Labs  
  09/29/20 
0741 LAC 0.8 Liver Enzymes Protein, total  
Date Value Ref Range Status 09/30/2020 7.6 6.4 - 8.2 g/dL Final  
 
Albumin Date Value Ref Range Status 09/30/2020 4.2 3.4 - 5.0 g/dL Final  
 
Globulin Date Value Ref Range Status 09/30/2020 3.4 2.0 - 4.0 g/dL Final  
 
A-G Ratio Date Value Ref Range Status 09/30/2020 1.2 0.8 - 1.7   Final  
 
Alk. phosphatase Date Value Ref Range Status 09/30/2020 85 45 - 117 U/L Final  
 
Recent Labs  
  09/30/20 0445 09/28/20 2102 TP 7.6 7.6 ALB 4.2 3.5 GLOB 3.4 4.1* AGRAT 1.2 0.9 AP 85 113 CBC w/Diff Recent Labs  
  09/30/20 0445 09/28/20 2102 WBC 16.1* 12.0  
RBC 4.09* 4.24* HGB 12.3* 13.3 HCT 38.9 39.9  374 GRANS  --  82* LYMPH  --  11* EOS  --  0 Cardiac Enzymes Lab Results Component Value Date/Time   09/29/2020 03:04 PM  
 CKMB 2.6 09/29/2020 03:04 PM  
 CKND1 2.3 09/29/2020 03:04 PM  
 TROIQ 0.02 09/29/2020 03:04 PM  
  
 
BNP No results found for: BNP, BNPP, XBNPT  
 
 Coagulation Recent Labs  
  09/28/20 2102 PTP 13.5 INR 1.0 APTT 33.0 Thyroid  No results found for: T4, T3U, TSH, TSHEXT, TSHEXT No results found for: T4  
 
Urinalysis Lab Results Component Value Date/Time Color YELLOW 09/29/2020 08:56 AM  
 Appearance CLEAR 09/29/2020 08:56 AM  
 Specific gravity 1.009 09/29/2020 08:56 AM  
 pH (UA) 7.0 09/29/2020 08:56 AM  
 Protein Negative 09/29/2020 08:56 AM  
 Glucose Negative 09/29/2020 08:56 AM  
 Ketone Negative 09/29/2020 08:56 AM  
 Bilirubin Negative 09/29/2020 08:56 AM  
 Urobilinogen 0.2 09/29/2020 08:56 AM  
 Nitrites Negative 09/29/2020 08:56 AM  
 Leukocyte Esterase Negative 09/29/2020 08:56 AM  
 Epithelial cells 0 09/29/2020 08:56 AM  
 Bacteria 0 (A) 09/29/2020 08:56 AM  
 WBC 0 to 3 09/29/2020 08:56 AM  
 RBC 0 to 3 09/29/2020 08:56 AM  
  
 
Micro  No results for input(s): SDES, CULT in the last 72 hours. No results for input(s): CULT in the last 72 hours. Culture data during this hospitalization. All Micro Results Procedure Component Value Units Date/Time [place] [facility] [[id number]] Collected:  09/29/20 0856 Order Status:  Completed Specimen:  Urine from Clean catch Updated:  09/29/20 2332 Echo 9/29/20: 
Result status: Final result · Contrast used: DEFINITY. · Left Ventricle: Normal cavity size, wall thickness and systolic function (ejection fraction normal). The estimated EF is 55 - 60%. There is mild (grade 1) left ventricular diastolic dysfunction E/E' ratio = 14.08. 
· Tricuspid Valve: Tricuspid valve not well visualized. No stenosis. Tricuspid regurgitation is inadequate for estimation of right ventricular systolic pressure. Images report reviewed by me: 
CT (Most Recent) (CT chest reviewed by me) [place] [facility] [place] CTA CHEST W OR W WO CONT Narrative EXAM: CTA chest 
 
INDICATION: Respiratory distress. COMPARISON: None TECHNIQUE: Axial CT imaging from the thoracic inlet through the diaphragm with 
intravenous contrast. Coronal and sagittal MIP reformats were generated. Dose 
reduction techniques used: automated exposure control, adjustment of the mAs 
and/or kVp according to patient size, and iterative reconstruction techniques. Digital imaging and communications in Medicine (DICOM) format image data are 
available to nonaffiliated external healthcare facilities or entities on a 
secure, media free, reciprocally searchable basis with patient authorization for 
at least 12 months after this study. _______________ FINDINGS: 
 
EXAM QUALITY: Non-diagnostic/Adequate/Excellent PULMONARY ARTERIES: No evidence of pulmonary embolism. MEDIASTINUM: Normal heart size. No evidence of right heart strain. Aorta is 
unremarkable. No pericardial effusion. LUNGS: There is bilateral bronchial thickening and lower lung field interstitial 
coarsening and faint lower lung field opacities. PLEURA: Normal. 
 
AIRWAY: Normal. 
 
LYMPH NODES: No enlarged nodes. UPPER ABDOMEN: There is bilateral adrenal gland thickening. OTHER: No acute or aggressive osseous abnormalities identified. _______________ Impression IMPRESSION: 
 
1. No evidence of pulmonary embolism. 2.  Bilateral bronchial thickening possibly related to bronchitis. 3. There is also bilateral lower lung field interstitial coarsening and faint 
lung opacities which may be related to hypoaeration. Edema or developing 
infiltrates considered less likely. CXR reviewed by me: 
XR (Most Recent). CXR  reviewed by me and compared with previous CXR Results from [facility] [place] encounter on 09/28/20 XR CHEST PORT Narrative EXAM:  AP Portable Chest X-ray 1 view INDICATION: Shortness of breath COMPARISON: None 
 
_______________ FINDINGS:  Heart size appears enlarged partly due to AP magnification and 
 mediastinal contours are within normal limits for portable radiograph. There are 
increased interstitial lung markings suggesting mild pulmonary edema with 
prominence of the central pulmonary vasculature. There are no pleural effusions. No acute osseous findings. ________________ Impression IMPRESSION: Increased interstitial lung markings and prominence of the central 
pulmonary vasculature suggesting mild pulmonary edema. Robb Oro MD 
9/30/2020

## 2020-09-30 NOTE — PROGRESS NOTES
Speech Therapy Note: SLP orders received and attempted however, patient:   
 
[]  Lethargic, unable to be alerted enough for safe PO intake 
[]  Refused participation []  Off the unit []  NPO for procedure 
[x]  Other: palliative care team at bedside SLP will f/u later this day or as medically indicated. Thank you for this referral.  
 
Gallo Yang M.S., CCC-SLP Speech Language Pathologist

## 2020-09-30 NOTE — PROGRESS NOTES
Transition of care: TBD, anticipate SNF Chart reviewed and noted PT is currently in ICU, now on 4L NC. Consult noted for placement at time of dc. [employer] referral to be screened for Medicaid to determine if he will qualify for LTSS services. Pt is not medically clear at this time, Will send referrals out to area facilities looking for a bed. PT has rapid covid of negative on 9-29-20. We will need PT/OT eval when patient is able to participate Care Management Interventions PCP Verified by CM: Yes 
Palliative Care Criteria Met (RRAT>21 & CHF Dx)?: Yes Current Support Network: Other

## 2020-09-30 NOTE — PROGRESS NOTES
Discussed with son Angle Courser who arrived at bedside, updated with current medical condition and overall poor prognosis. Angle Courser agreed to continue DNR/DNI status. Discussed with palliative care team with son Angle Courser. All questions answered to his satisfaction. Since respiratory status has improved, will transfer to remote telemetry. We will continue to follow from pulmonary perspective on medical floor.  
 
Wai Barros MD 9/30/2020 2:50 PM

## 2020-09-30 NOTE — DIABETES MGMT
GLYCEMIC CONTROL PROGRESS NOTE: 
 
- chart reviewed, no known h/o DM 
- Solumedrol 80 mg Q 6 hours , steroid associated hyperglycemia 
- - Humalog Normal Insulin Sensitivity Corrective Coverage ordered per protocol Recent Glucose Results:  
Lab Results Component Value Date/Time  (H) 09/30/2020 04:45 AM  
 GLUCPOC 166 (H) 09/29/2020 05:58 PM  
 GLUCPOC 178 (H) 09/29/2020 12:09 PM  
 
Vee Padron MS, RN, CDE Glycemic Control Team 
[phone] Pager [phone] (M-TH 8:00-4:30P) *After Hours pager [phone]

## 2020-09-30 NOTE — PROGRESS NOTES
Palliative Medicine Consult [facility]: [phone]) Formerly [place] [facility]: [phone] [facility]: [phone] Patient Name: Neelima Irvin YOB: 1940 Date of Initial Consult: 9/29/2020; follow up: 9/30/2020 Reason for Consult: care decisions Requesting Provider: Robb Oro MD 
Primary Care Physician: Kiko, MD Evelin 
  
 SUMMARY:  
Neelima Irvin is a 68 y.o. male with a past history of MI, CAD with stents, BRANDEN, COPD, chronic respiratory failure with home oxygen, traumatic brain injury and seizures who was admitted on 9/28/2020 from home with a diagnosis of exacerbation of COPD. Current medical issues leading to Palliative Medicine involvement include: COPD, dementia and goals of care. 9/30/2020: Palliative care team including INDIGO Byers and this NP met with patient in his ICU room. He is awake and alert. He was engaged in conversation with the team and appears to answer simple questions appropriately. He was able to tell us family members names and locations. He stated he has some documents with his  but was unable to identify them. At that time, we instructed patient we would follow up with his family. PALLIATIVE DIAGNOSES:  
1. Advanced care decisions 2. Exacerbation of COPD 3. Acute on chronic respiratory failure 4. Altered mental status PLAN:  
 
9/30/2020: Telephone call placed to son, Santana Uriarte who the patient lives with. Anne Marie Stoner stated he has a Durable POA that was done in [place], but unsure if it includes medical. He also stated he was his father's medical point of contact previously. Santana Uriarte is coming this afternoon to visit with his father and we will meet with them at that time to discuss AMD and goals of care. Follow up to this morning's call/visit, met with patient and his son, Aaliyah Gilbert, in patient's room. Patient identified 4 children this morning: Adarsh Serrano and Santana Uriarte.  In meeting this afternoon, it was discovered that [address] [place] [address] are estranged from the family and neither the patient or Tamir Gupta or Jason Marrero have any idea how to get in touch with them. Jason Marrero was on the cell phone with Tamir Gupta for much of this meeting and is in agreement with plans as agreed to by Tamir Gupta. Patient stated during our meeting that he would have [facility] as his MPOA. Kathy Stephens is patient's  from time he lived in [place]. Patient lacks insight into the appropriateness of appointee for MPOA. It was agreed by all to wait for further mental clearing before completing documentation. OF NOTE: PATIENT'S SON STATES HIS FATHER DOES NOT DRINK ALCOHOL nor have a diagnosis of dementia. Patient has had a traumatic brain injury decades ago and is on dilantin for seizures. He has been instructed to not drink. No change in GOALS OF CARE: DNR/DNI, NO INTUBATION FOR RESPIRATORY DISTRESS. See previous notes shown below:  
 
9/29/2020 1. Advanced care decisions: Palliative care team including INDIGO Gamboa and this NP met with patient at bedside. He is currently resting on HFNC in the ICU. Patient's son, Tamir Gupta, had just left after conversing with the team. Patient lives with Tamir Gupta and his wife. Per conversations with the admitting physician, Tamir Gupta is the [place]. Will need to obtain copies of the AMD. Tamir Gupta has also instructed the physicians regarding patient's code status. Will follow for improvement vs decline in health for additional conversations and to obtain POST. GOALS OF CARE: DNR/DNI, no intubation for respiratory distress. 2.  Exacerbation of COPD: presented with 2 day history of increasing shortness of breath refractory to rescue inhaler. IV antibiotics & steroids initiated, currently on HFNC. Primary team managing. 3.  Acute on chronic respiratory failure: on home oxygen of 2 LPM baseline. Refuses CPAP for BRANDEN and uses supplemental oxygen per concentrator. 4.  Alcohol abuse: per history daily beer drinker. CIWA scale observation. This information later clarified and is untrue. 5.  Dementia: per history complicated by severe agitation and combative behavior. This information later clarified and is untrue. 6.  Initial consult note routed to primary continuity provider 7. Communicated plan of care with: Palliative IDT 
 
GOALS OF CARE: 
Patient/Health Care Proxy Stated Goals: Rehabilitation TREATMENT PREFERENCES:  
Code Status: DNR/DNI Advance Care Planning: No flowsheet data found. Medical Interventions: Limited additional interventions Other: As far as possible, the palliative care team has discussed with patient/health care proxy about goals of care/treatment preferences for patient. HISTORY:  
 
History obtained from: chart CHIEF COMPLAINT: some shortness of breath HPI/SUBJECTIVE: The patient is:  
[x] Verbal and participatory [] Non-participatory due to:  
Denies pain and nausea. Admits to \"some\" shortness of breath which has improved since admission Clinical Pain Assessment (nonverbal scale for nonverbal patients): Clinical Pain Assessment Severity: 0 Activity (Movement): Lying quietly, normal position Duration: for how long has pt been experiencing pain (e.g., 2 days, 1 month, years) Frequency: how often pain is an issue (e.g., several times per day, once every few days, constant) FUNCTIONAL ASSESSMENT:  
 
Palliative Performance Scale (PPS): PPS: 40 ECOG 
ECOG Status : Limited self-care PSYCHOSOCIAL/SPIRITUAL SCREENING:  
  
Any spiritual / Hindu concerns: 
[] Yes /  [x] No 
 
Caregiver Burnout: 
[] Yes /  [] No /  [x] No Caregiver Present Anticipatory grief assessment:  
[x] Normal  / [] Maladaptive REVIEW OF SYSTEMS:  
 
Positive and pertinent negative findings in ROS are noted above in HPI.  
The following systems were [x] reviewed / [] unable to be reviewed as noted in HPI 
 Other findings are noted below. Systems: constitutional, ears/nose/mouth/throat, respiratory, gastrointestinal, genitourinary, musculoskeletal, integumentary, neurologic, psychiatric, endocrine. Positive findings noted below. Modified ESAS Completed by: provider Pain: 0 Nausea: 0 Dyspnea: 2 PHYSICAL EXAM:  
 
Wt Readings from Last 3 Encounters:  
09/29/20 78.5 kg (173 lb) Blood pressure (!) 141/73, pulse 88, temperature 98.4 °F (36.9 °C), resp. rate (!) 32, height 5' 8\" (1.727 m), weight 78.5 kg (173 lb), SpO2 97 %. Pain: 
Pain Scale 1: Numeric (0 - 10) Pain Intensity 1: 0 Constitutional: Elderly male, lying in bed in no apparent distress. Eyes: anicteric, pupil equal 
Respiratory: breathing mildly labored with some accessory muscle use, oxygen per nasal cannula. Musculoskeletal: no deformity Skin: warm, dry Neurological: Following commands, moving all extremities, oriented X 2 (self and place). HISTORY:  
 
Principal Problem: 
  Chronic obstructive pulmonary disease with acute exacerbation ([address] (9/28/2020) Active Problems: 
  Tobacco dependence (9/28/2020) Supplemental oxygen dependent (9/29/2020) Suspected 2019 novel coronavirus infection (9/29/2020) Hypoxia (9/29/2020) CAD (coronary artery disease) (9/29/2020) Combative behavior (9/29/2020) Alcohol abuse (9/29/2020) Dementia ([address] (9/29/2020) BRANDEN (obstructive sleep apnea) (9/29/2020) Acute respiratory distress (9/29/2020) Acute respiratory failure with hypoxia and hypercapnia ([address] (9/29/2020) Altered mental status (9/29/2020) Past Medical History:  
Diagnosis Date  CAD (coronary artery disease)  Chronic obstructive pulmonary disease (HCC)   
 on 2 liters at home  Dementia ([address]  BRANDEN (obstructive sleep apnea) Past Surgical History:  
Procedure Laterality Date  CARDIAC SURG PROCEDURE UNLIST stents x6 Family History Problem Relation Age of Onset  Heart Disease Father History reviewed, no pertinent family history. Social History Tobacco Use  Smoking status: Current Every Day Smoker Packs/day: 2.00  Smokeless tobacco: Never Used Substance Use Topics  Alcohol use: Yes Alcohol/week: 7.0 standard drinks Types: 7 Cans of beer per week No Known Allergies Current Facility-Administered Medications Medication Dose Route Frequency  insulin lispro (HUMALOG) injection   SubCUTAneous Q6H  
 glucose chewable tablet 16 g  4 Tab Oral PRN  
 glucagon (GLUCAGEN) injection 1 mg  1 mg IntraMUSCular PRN  
 dextrose 10% infusion 125-250 mL  125-250 mL IntraVENous PRN  
 QUEtiapine (SEROquel) tablet 25 mg  25 mg Oral QHS  methylPREDNISolone (PF) (Solu-MEDROL) injection 40 mg  40 mg IntraVENous Q6H  
 albuterol-ipratropium (DUO-NEB) 2.5 MG-0.5 MG/3 ML  3 mL Nebulization QID RT  
 budesonide (PULMICORT) 500 mcg/2 ml nebulizer suspension  500 mcg Nebulization BID RT  
 sodium chloride (NS) flush 5-40 mL  5-40 mL IntraVENous Q8H  
 acetaminophen (TYLENOL) tablet 650 mg  650 mg Oral Q6H PRN Or  
 acetaminophen (TYLENOL) suppository 650 mg  650 mg Rectal Q6H PRN  polyethylene glycol (MIRALAX) packet 17 g  17 g Oral DAILY PRN  promethazine (PHENERGAN) tablet 12.5 mg  12.5 mg Oral Q6H PRN Or  
 ondansetron (ZOFRAN) injection 4 mg  4 mg IntraVENous Q6H PRN  
 enoxaparin (LOVENOX) injection 40 mg  40 mg SubCUTAneous DAILY  nitroglycerin (NITROBID) 2 % ointment 0.5 Inch  0.5 Inch Topical BID  clopidogreL (PLAVIX) tablet 75 mg  75 mg Oral DAILY  phenytoin ER (DILANTIN ER) ER capsule 100 mg  100 mg Oral BID  tamsulosin (FLOMAX) capsule 0.4 mg  0.4 mg Oral DAILY  diphenhydrAMINE (BENADRYL) injection 12.5 mg  12.5 mg IntraVENous Q6H PRN  
 albuterol (PROVENTIL HFA, VENTOLIN HFA, PROAIR HFA) inhaler (Keep In Patient Room)  2 Puff Inhalation Q4H PRN  
 ipratropium (ATROVENT HFA) 17 mcg inhaler (Keep In Patient)  2 Puff Inhalation Q4H PRN  
 LORazepam (ATIVAN) tablet 1 mg  1 mg Oral Q1H PRN Or  
 LORazepam (ATIVAN) injection 1 mg  1 mg IntraVENous Q1H PRN  
 LORazepam (ATIVAN) tablet 2 mg  2 mg Oral Q1H PRN Or  
 LORazepam (ATIVAN) injection 2 mg  2 mg IntraVENous Q1H PRN  
 LORazepam (ATIVAN) injection 3 mg  3 mg IntraVENous Q15MIN PRN  
 nicotine (NICODERM CQ) 21 mg/24 hr patch 1 Patch  1 Patch TransDERmal DAILY  albumin human 25% (BUMINATE) solution 25 g  25 g IntraVENous Q6H  
 haloperidol lactate (HALDOL) injection 3 mg  3 mg IntraVENous Q4H PRN  piperacillin-tazobactam (ZOSYN) 3.375 g in 0.9% sodium chloride (MBP/ADV) 100 mL MBP  3.375 g IntraVENous Q8H  
 atorvastatin (LIPITOR) tablet 40 mg  40 mg Oral DAILY  0.9% sodium chloride 1,000 mL with mvi, adult no. 4 with vit K 10 mL, thiamine 814 mg, folic acid 1 mg infusion   IntraVENous DAILY  
 
 
 LAB AND IMAGING FINDINGS:  
 
Lab Results Component Value Date/Time WBC 16.1 (H) 09/30/2020 04:45 AM  
 HGB 12.3 (L) 09/30/2020 04:45 AM  
 PLATELET 395 37/31/1294 04:45 AM  
 
Lab Results Component Value Date/Time Sodium 143 09/30/2020 04:45 AM  
 Potassium 4.0 09/30/2020 04:45 AM  
 Chloride 105 09/30/2020 04:45 AM  
 CO2 30 09/30/2020 04:45 AM  
 BUN 21 (H) 09/30/2020 04:45 AM  
 Creatinine 0.83 09/30/2020 04:45 AM  
 Calcium 8.8 09/30/2020 04:45 AM  
  
Lab Results Component Value Date/Time Alk. phosphatase 85 09/30/2020 04:45 AM  
 Protein, total 7.6 09/30/2020 04:45 AM  
 Albumin 4.2 09/30/2020 04:45 AM  
 Globulin 3.4 09/30/2020 04:45 AM  
 
Lab Results Component Value Date/Time INR 1.0 09/28/2020 09:02 PM  
 Prothrombin time 13.5 09/28/2020 09:02 PM  
 aPTT 33.0 09/28/2020 09:02 PM  
  
No results found for: IRON, FE, TIBC, IBCT, PSAT, FERR No results found for: PH, PCO2, PO2 No components found for: [place] Lab Results Component Value Date/Time  09/29/2020 03:04 PM  
 CK - MB 2.6 09/29/2020 03:04 PM  
  
 
   
 
Total time: 35 minutes Counseling / coordination time, spent as noted above > 50% counseling / coordination: 20 minutes with patient, family, RN Prolonged service was provided for  []30 min   []75 min in face to face time in the presence of the patient, spent as noted above. Time Start:  
Time End:  
Note: this can only be billed with 78729 (initial) or 66319 (follow up). If multiple start / stop times, list each separately.

## 2020-09-30 NOTE — PROGRESS NOTES
Problem: Mobility Impaired (Adult and Pediatric) Goal: *Acute Goals and Plan of Care (Insert Text) Description: PT goals to be met in 1-7 days: Pt will be able to perform supine<>sit S for transfers at home. Pt will be able to perform sit<>stand S for increased ability to transfer at home safely. Pt will be able to participate in gt training >100' w/ RW, O2 support, GB and SBA for improved ability in home upon d/c. Pt will be able to perform stair training >2 steps, B/U rail and CGA to obtain safe entry into home upon d/c. Pt will be educated regarding HEP per MD protocol for optimal AROM/strength outcomes. Note: PHYSICAL THERAPY EVALUATION Patient: Murali Mercer (77 y.o. male) Date: 9/30/2020 Primary Diagnosis: COPD exacerbation ([facility] [J44.1] Acute respiratory distress [R06.03] Precautions:   Fall, Aspiration, DNR 
 
PLOF: O2 dependent (2L), uses RW/rollator as needed; lives alone-neighbors check on him 3-4x/day ASSESSMENT : 
Based on the objective data described below, the patient presents with decreased mobility in regards to bed mobility, transfers, gt quality and tolerance, balance, stair negotiation and safety. Decreased AROM of B shoulder, dec generalized strength, pain in B shoulder also impacting pt functional mobility. Pt rating pain on numerical pain scale pre/post and during session 6/10 generalized. Pt ed regarding mobility safety, and safe techniques. Pt having bouts of coughing, productive, thick yellow/tan several times during session and during movement. Pt able to perform supine<>sit w/ min A/CGA and sit<>stand w/ min A. Safety vc required throughout session to reinforce safety. Pt able to participate in gt training using RW, GB and CGA/min A w/ mild unsteadiness. Pt sat EOB x15' w/ ability to maintain own balance, productive coughing. Answered questions by pt.   Pt left supine in bed w/ all needs within reach and bed alarm activated. Nurse Daria Yung aware of session. Recommend rehab upon hospital d/c depending on pt PT progress. Patient will benefit from skilled intervention to address the above impairments. Patient's rehabilitation potential is considered to be Fair Factors which may influence rehabilitation potential include:  
[]         None noted 
[]         Mental ability/status [x]         Medical condition 
[x]         Home/family situation and support systems 
[x]         Safety awareness 
[]         Pain tolerance/management 
[]         Other: PLAN : 
Recommendations and Planned Interventions:  
[x]           Bed Mobility Training             []    Neuromuscular Re-Education 
[x]           Transfer Training                   []    Orthotic/Prosthetic Training 
[x]           Gait Training                          []    Modalities [x]           Therapeutic Exercises           []    Edema Management/Control 
[x]           Therapeutic Activities            [x]    Family Training/Education 
[x]           Patient Education 
[]           Other (comment): Frequency/Duration: Patient will be followed by physical therapy 1-2 times per day/4-7 days per week to address goals. Discharge Recommendations: [address] Further Equipment Recommendations for Discharge: N/A  
 
SUBJECTIVE:  
Patient stated I want to get up.  OBJECTIVE DATA SUMMARY:  
 
Past Medical History:  
Diagnosis Date CAD (coronary artery disease) Chronic obstructive pulmonary disease (HCC)   
 on 2 liters at home Dementia ([facility] [address] BRANDEN (obstructive sleep apnea) Past Surgical History:  
Procedure Laterality Date CARDIAC SURG PROCEDURE UNLIST    
 stents x6 Barriers to Learning/Limitations: yes;  cognitive Compensate with: Visual Cues, Verbal Cues, and Tactile Cues Home Situation: 
Home Situation Home Environment: Private residence # Steps to Enter: 2 Rails to Enter: Yes Hand Rails : Left One/Two Story Residence: One story Living Alone: Yes Support Systems: Child(noreen) Patient Expects to be Discharged to[de-identified] Private residence Current DME Used/Available at Home: Oxygen, portable, Walker, rolling, Walker, rollator Critical Behavior: 
Neurologic State: Alert Orientation Level: Oriented to person;Oriented to place; Disoriented to situation;Disoriented to time Cognition: Follows commands; Appropriate for age attention/concentration Safety/Judgement: Awareness of environment Psychosocial 
Patient Behaviors: Calm; Cooperative Needs Expressed: Educational;Emotional 
Purposeful Interaction: Yes Pt Identified Daily Priority: Clinical issues (comment) Caritas Process: Nurture loving kindness;Enable inna/hope;Establish trust;Nurture spiritual self;Teaching/learning; Attend basic human needs;Create healing environment Caring Interventions: Reassure Reassure: Therapeutic listening; Informing; Acceptance; Instilling inna and hope Strength:   
Strength: Generally decreased, functional 
Tone & Sensation:  
Tone: Normal 
Sensation: Intact Range Of Motion: 
AROM: Generally decreased, functional 
PROM: Generally decreased, functional 
Functional Mobility: 
Bed Mobility: 
Supine to Sit: Minimum assistance; Additional time(vc) 
Sit to Supine: Contact guard assistance(vc) 
Scooting: Contact guard assistance(vc) 
Transfers: 
Sit to Stand: Minimum assistance; Additional time(vc) 
Stand to Sit: Contact guard assistance(vc) 
Balance:  
Sitting: Intact; With support Standing: Intact; With support Ambulation/Gait Training: 
Distance (ft): 20 Feet (ft) Assistive Device: Walker, rolling;Gait belt(4L O2 via NC) Ambulation - Level of Assistance: Contact guard assistance;Minimal assistance(vc) 
Gait Abnormalities: Decreased step clearance(forward flex at trunk) Base of Support: Narrowed Stance: Weight shift;Time Speed/Netta: Slow Step Length: Left shortened;Right shortened Interventions: Safety awareness training; Tactile cues; Verbal cues; Visual/Demos Therapeutic Exercises:  
Pain: 
Pain level pre-treatment: 6/10 Pain level post-treatment: 6/10 Pain Intervention(s) : Medication (see MAR); Rest, Ice, Repositioning Response to intervention: Nurse notified, See doc flow Activity Tolerance:  
Fair Please refer to the flowsheet for vital signs taken during this treatment. After treatment:  
[]         Patient left in no apparent distress sitting up in chair 
[x]         Patient left in no apparent distress in bed 
[x]         Call bell left within reach [x]         Nursing notified 
[]         Caregiver present [x]         Bed alarm activated 
[]         SCDs applied COMMUNICATION/EDUCATION:  
[x]         Role of Physical Therapy in the acute care setting. [x]         Fall prevention education was provided and the patient/caregiver indicated understanding. [x]         Patient/family have participated as able in goal setting and plan of care. [x]         Patient/family agree to work toward stated goals and plan of care. []         Patient understands intent and goals of therapy, but is neutral about his/her participation. []         Patient is unable to participate in goal setting/plan of care: ongoing with therapy staff. 
[]         Other: Thank you for this referral. 
Tha Reid, PT Time Calculation: 27 mins Eval Complexity: History: HIGH Complexity :3+ comorbidities / personal factors will impact the outcome/ POC Exam:MEDIUM Complexity : 3 Standardized tests and measures addressing body structure, function, activity limitation and / or participation in recreation  Presentation: MEDIUM Complexity : Evolving with changing characteristics  Clinical Decision Making:Low Complexity    Overall Complexity:LOW

## 2020-09-30 NOTE — PROGRESS NOTES
Physician Progress Note Justino Fish 
CSN #:                  [id number] :                       1940 ADMIT DATE:       2020 8:58 PM 
[address] [place] DATE: 
RESPONDING 
PROVIDER #:        Montserrat Eduardo MD 
 
 
 
 
QUERY TEXT: 
 
Dear Hospitalist: 
 
Pt admitted with COPD exacerbation . Pt noted to have Respiratory distress. If possible, please document in the progress notes and discharge summary if you are evaluating and/or treating any of the following: The medical record reflects the following: 
Risk Factors: COPD Clinical Indicators: Copd with hypoxia. Severe respiratory distress ,  chest tightness, shortness of breath and wheezing. increased work of breathing with abdominal breathing and accessory muscle use, increased chest excursion bilaterally 
,  only able to speak in 1 or 2 word sentences before becoming too short of breath. ABG Pc02 54, PH 7.34. respirations 28 to 34. Patient on 2L O2 at home as needed Treatment: transfer to ICU and sats  on NRB  at 100% . Enedina Leonardo Thank- You Aries Castillo RN [facility] [phone] Options provided: 
-- Acute respiratory failure with hypoxia 
-- Acute respiratory failure with hypercapnia 
-- Chronic respiratory failure with hypoxia -- Chronic respiratory failure with hypercapnia 
-- Acute on chronic respiratory failure with hypoxia 
-- Acute on chronic respiratory failure with hypercapnia 
-- Other - I will add my own diagnosis -- Disagree - Not applicable / Not valid -- Disagree - Clinically unable to determine / Unknown 
-- Refer to Clinical Documentation Reviewer PROVIDER RESPONSE TEXT: 
 
This patient is in acute respiratory failure with hypoxia.  
 
Query created by: Sloan Mahajan on 2020 11:17 AM 
 
 
Electronically signed by:  Montserrat Eduardo MD 2020 8:45 PM

## 2020-10-01 NOTE — PROGRESS NOTES
1405: Pt is confused, thinks he is in [place]. Pt refusing to participate in PT when asked multiple times. Will follow up as schedule permits.

## 2020-10-01 NOTE — PROGRESS NOTES
[facility]ists Pulmonary, Critical Care, and Sleep Medicine Name: Hugo Calderon MRN: [id number] : 1940 Hospital: [facility] [place] Date: 10/1/2020  Room: [address] [place] [address] Note Consult requesting physician: Dr. George Marquez Reason for Consult: COPD exacerbation IMPRESSION:  
Acute respiratory failure with hypoxia and hypercapnia (HCC) J96.01, J96.02 Chronic obstructive pulmonary disease with acute exacerbation (HCC) J44.1 Altered mental status R41.82 Dementia ([facility] [address] F03.90 · · Patient Active Problem List  
Diagnosis Code  Tobacco dependence F17.200  Chronic obstructive pulmonary disease with acute exacerbation (HCC) J44.1  Supplemental oxygen dependent Z99.81  
 Suspected 2019 novel coronavirus infection Z20.828  
 Hypoxia R09.02  
 CAD (coronary artery disease) I25.10  Combative behavior R46.89  
 Alcohol abuse F10.10  D[facility] ([facility] F03.90  
 BRANDEN (obstructive sleep apnea) G47.33  
 Acute respiratory distress R06.03  
 Acute respiratory failure with hypoxia and hypercapnia (HCC) J96.01, J96.02  
 Altered mental status R41.82  
 
 
 
· Code status: DNR/DNI  
  
RECOMMENDATIONS:  
Respiratory: Hx COPD, chronic hypoxic respiratory failure on home O2, active smoker, BRANDEN intolerant to CPAP. Admitted with acute on chronic hypoxic and hypercapnic respiratory failure, COPD exacerbation. CTA chest: Negative for PE. Bronchial wall thickening and possible basilar atelectasis versus hypoinflation. Could not tolerate BiPAP in ER due to agitation/dementia. Initially required HF NC. 
DNR/DNI status. HFNC changed to nasal cannula in ICU. Transferred to medical floor on 2020. Currently on NC 2 LPM. 
Initially patient was confused and obtunded, now alert awake and cooperative. Still at high risk for aspiration. Speech therapist recommended n.p.o. and MBS. CXR: Bilateral diffuse interstitial marking and infiltrates, worsened. This could be due to possible aspiration. Continue DuoNeb 4 times daily and Pulmicort twice daily, DuoNeb as needed. Reduce Solu-Medrol to 40 mg IV every 8 hours. Antibiotics: Continue Zosyn for bronchitis and possible aspiration. Continue nicotine patch. Recommended and discussed with patient to stop/do not restart smoking. Keep SPO2 >=92%. HOB 30 degree elevation all the time. Aggressive pulmonary toileting. Aspiration precautions. Incentive spirometry. The following issues are not being actively managed by me and last addressed/updated on 9/30/2020 when patient was in ICU; defer to hospitalist and respective consultant. 
________________________________________________________________________________ CVS: Hx MI in 03/2020, not a candidate for CABG, 6 stent placed. Echo 9/29/2020: LVEF 55 to 60%. Grade 1 DD. Continue Plavix, Nitro-Bid, Lipitor. ID:  
Rapid COVID19 negative. COVID19 9/29/2020: Pending. CTA chest with possible bronchitis. UA negative. Urine culture pending. Not able to collect the sputum sample. Lactic acid normal.  No fever or leukocytosis. Antibiotic: Continue Zosyn for acute bronchitis. Deescalate antibiotic when appropriate. Hematology/Oncology: No acute issues. Renal: Normal creatinine. Monitor electrolytes, renal function and urine output. GI/: Continue Flomax for BPH. Will DC Fitzgerald catheter and place condom catheter. Endocrine: Monitor BS. Monitor for any hypoglycemia. Neurology: Hx dementia. Admitted with severe agitation. Required Haldol in ER and last night. Start Seroquel 25 mg p.o. nightly. Check Dilantin level and change Dilantin to IV equivalent dose, by pharmacist. 
CT head ordered. Toxicology: Hx alcohol abuse. Banana bag daily. Continue to follow CIWA scoring and management. Pain/Sedation: No acute issues. Skin/Wound: No acute issues. Electrolytes: Replace electrolytes per ICU electrolyte replacement protocol. IVF: None. Nutrition: N.p.o.  Strict aspiration precautions. ST evaluation was not completed due to mental status yesterday. Speech therapist to follow. Prophylaxis: DVT Prophylaxis: SCD/Lovenox. GI Prophylaxis: Low risk for stress ulcer. Tonye Cogan Restraints: none Lines/Tubes: PIV Fitzgerald: Placed in ER 9/29/2020 -to be discontinued 9/30/2020 after placing condom catheter. Advance Directive/Palliative Care: consulted, discussed with palliative care team as well 
____________________________________________________________________________ Will defer respective systems problem management to primary and other respective consultant and follow patient from pulmonary perspective. Further recommendations will be based on the patient's response to recommended treatment and results of the investigation ordered. Care of plan d/w patient, RN, RT, Dr. Rob Rizo. Moderate complexity decision making was performed during the evaluation of this patient at high risk for decompensation with multiple organ involvement. Subjective/History of Present Illness:  
 
Patient is a 68 y.o. male with PMHx significant for dementia, CAD, alcohol abuse, tobacco dependence/smoker, BRANDEN intolerant with CPAP, chronic hypoxic respiratory failure on home O2, COPD; admitted with combative behavior likely due to dementia, COPD exacerbation, acute hypoxic and hypercapnic respiratory failure. Patient used to live other state, not able to take care of him and so recently moved to live with his son in [place]. He had MRI in 03/2020, not a candidate for CABG, had 6 stent placed, per history. He still an active smoker and alcohol abuser. Hx BRANDEN but intolerant to CPAP and not using CPAP. He is chronic hypoxic respiratory failure on home O2, but an active smoker.  
Patient admitted to ICU in altered mental status/severe agitation along with acute hypoxic and hypercapnic respiratory failure with COPD exacerbation, on HF NC. 
 
10/1/2020: 
Transfer out of ICU on 9/30/2020. On 1 LPM NC. CXR with worsening interstitial infiltrate. Speech therapy recommended n.p.o. and MBS. Patient is more alert, awake and cooperative and moving all 4 extremities. Blood pressure stable. No fever. Leukocytosis present. CXR with increasing vascular congestion versus alveolar infiltrate. 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 ABG noted. I/O last 24 hrs: Intake/Output Summary (Last 24 hours) at 10/1/2020 1248 Last data filed at 10/1/2020 3639 Gross per 24 hour Intake 1117.5 ml Output 400 ml Net 717.5 ml The patient is critically ill and can not provide additional history due to Unable to comprehend History taken from patient's son Kermitt Sever and  EMR Review of Systems: 
ROS not obtained due to patient factor. No Known Allergies Past Medical History:  
Diagnosis Date  CAD (coronary artery disease)  Chronic obstructive pulmonary disease (HCC)   
 on 2 liters at home  Dementia ([address]  BRANDEN (obstructive sleep apnea) Past Surgical History:  
Procedure Laterality Date  CARDIAC SURG PROCEDURE UNLIST    
 stents x6 Social History Tobacco Use  Smoking status: Current Every Day Smoker Packs/day: 2.00  Smokeless tobacco: Never Used Substance Use Topics  Alcohol use: Yes Alcohol/week: 7.0 standard drinks Types: 7 Cans of beer per week Family History Problem Relation Age of Onset  Heart Disease Father Prior to Admission medications Medication Sig Start Date End Date Taking? Authorizing Provider  
clopidogreL (Plavix) 75 mg tab Take 75 mg by mouth daily. Indications: stent placement   Yes Other, MD Evelin  
potassium chloride (KLOR-CON) 10 mEq tablet Take 10 mEq by mouth daily. Yes Other, MD Evelin  
tamsulosin (Flomax) 0.4 mg capsule Take 0.4 mg by mouth.  Indications: enlarged prostate with urination problem   Yes Other, MD Evelin  
doxycycline (ADOXA) 100 mg tablet Take 100 mg by mouth two (2) times a day. Yes Other, MD Evelin  
phenytoin ER (Dilantin Extended) 100 mg ER capsule Take 100 mg by mouth two (2) times a day. Indications: epilepsy   Yes Kiko, MD Evelin  
furosemide (Lasix) 40 mg tablet Take 40 mg by mouth daily. Indications: visible water retention   Yes Other, MD Evelin  
 
Current Facility-Administered Medications Medication Dose Route Frequency  0.45% sodium chloride infusion  75 mL/hr IntraVENous CONTINUOUS  
 insulin glargine (LANTUS) injection 10 Units  10 Units SubCUTAneous DAILY  insulin lispro (HUMALOG) injection   SubCUTAneous Q6H  
 [Held by provider] QUEtiapine (SEROquel) tablet 25 mg  25 mg Oral QHS  methylPREDNISolone (PF) (Solu-MEDROL) injection 40 mg  40 mg IntraVENous Q6H  
 albuterol-ipratropium (DUO-NEB) 2.5 MG-0.5 MG/3 ML  3 mL Nebulization QID RT  
 budesonide (PULMICORT) 500 mcg/2 ml nebulizer suspension  500 mcg Nebulization BID RT  
 phenytoin (DILANTIN) injection 100 mg  100 mg IntraVENous BID  piperacillin-tazobactam (ZOSYN) 3.375 g in 0.9% sodium chloride (MBP/ADV) 100 mL MBP  3.375 g IntraVENous Q6H  
 sodium chloride (NS) flush 5-40 mL  5-40 mL IntraVENous Q8H  
 enoxaparin (LOVENOX) injection 40 mg  40 mg SubCUTAneous DAILY  nitroglycerin (NITROBID) 2 % ointment 0.5 Inch  0.5 Inch Topical BID  [Held by provider] clopidogreL (PLAVIX) tablet 75 mg  75 mg Oral DAILY  [Held by provider] tamsulosin (FLOMAX) capsule 0.4 mg  0.4 mg Oral DAILY  nicotine (NICODERM CQ) 21 mg/24 hr patch 1 Patch  1 Patch TransDERmal DAILY  albumin human 25% (BUMINATE) solution 25 g  25 g IntraVENous Q6H  
 [Held by provider] atorvastatin (LIPITOR) tablet 40 mg  40 mg Oral DAILY  0.9% sodium chloride 1,000 mL with mvi, adult no. 4 with vit K 10 mL, thiamine 027 mg, folic acid 1 mg infusion   IntraVENous DAILY Objective: Vital Signs:   
Visit Vitals /73 Pulse 92 Temp 97.9 °F (36.6 °C) Resp 18 Ht 5' 8\" (1.727 m) Wt 72.9 kg (160 lb 11.5 oz) SpO2 96% BMI 24.44 kg/m² O2 Device: Nasal cannula O2 Flow Rate (L/min): 2 l/min Temp (24hrs), Av.9 °F (36.6 °C), Min:97.4 °F (36.3 °C), Max:98.7 °F (37.1 °C) Intake/Output:  
Last shift:      10/01 0701 - 10/01 1900 In: 200 [I.V.:200] Out: - Last 3 shifts: 1901 - 10/01 0700 In: 1877.5 [I.V.:1877.5] Out: 5338 [PMRSO:4341] Intake/Output Summary (Last 24 hours) at 10/1/2020 1248 Last data filed at 10/1/2020 2734 Gross per 24 hour Intake 1117.5 ml Output 400 ml Net 717.5 ml Last 3 Recorded Weights in this Encounter  
 203 20 1401 20 2236 Weight: 78.7 kg (173 lb 9.6 oz) 78.5 kg (173 lb) 72.9 kg (160 lb 11.5 oz) Recent Labs 10/01/20 
0305 20 
0756 20 
2137 PHI 7.38 7.37 7.40 PCO2I 43.4 54.0* 41.1 PO2I 62* 193* 74* HCO3I 25.8 31.1* 25.8 FIO2I 0.40 100 0.28 Physical Exam:  
 
General/Neurology: Alert, awake and following simple commands. Pleasant. NAD. Nonfocal.  Moving all 4 extremities. Head:   NCAT. Eye:   EOM intact. No icterus, cyanosis or pallor. Throat:  No oral thrush. Neck:   Trachea midline. No palpable cervical lymphadenopathy. Lung: Moderate air entry bilateral equal.  Scattered rhonchi/rales. No wheezing. No accessory muscle use. No prolonged expiration. Heart:   No murmur or JVD. Abdomen:  Soft, nontender, nondistended. Extremities:  No edema. Clubbing present. Pulses: 2+ and symmetric in DP. Lymphatic:  No cervical or supraclavicular palpable lymphadenopathy. Data:  
   
Recent Results (from the past 24 hour(s)) GLUCOSE, POC Collection Time: 20  6:00 PM  
Result Value Ref Range Glucose (POC) 160 (H) 70 - 110 mg/dL GLUCOSE, POC Collection Time: 20 11:31 PM  
Result Value Ref Range Glucose (POC) 138 (H) 70 - 110 mg/dL POC G3 Collection Time: 10/01/20  3:05 AM  
Result Value Ref Range Device: NASAL CANNULA Flow rate (POC) 5 L/M  
 FIO2 (POC) 0.40 % pH (POC) 7.38 7.35 - 7.45    
 pCO2 (POC) 43.4 35.0 - 45.0 MMHG  
 pO2 (POC) 62 (L) 80 - 100 MMHG  
 HCO3 (POC) 25.8 22 - 26 MMOL/L  
 sO2 (POC) 91 (L) 92 - 97 % Base excess (POC) 1 mmol/L Allens test (POC) YES Total resp. rate 28 Site LEFT RADIAL Patient temp. 98.6 Specimen type (POC) ARTERIAL Performed by Park Trinidad GLUCOSE, POC Collection Time: 10/01/20  6:02 AM  
Result Value Ref Range Glucose (POC) 207 (H) 70 - 110 mg/dL METABOLIC PANEL, COMPREHENSIVE Collection Time: 10/01/20  6:35 AM  
Result Value Ref Range Sodium 146 (H) 136 - 145 mmol/L Potassium 3.9 3.5 - 5.5 mmol/L Chloride 108 100 - 111 mmol/L  
 CO2 29 21 - 32 mmol/L Anion gap 9 3.0 - 18 mmol/L Glucose 189 (H) 74 - 99 mg/dL BUN 36 (H) 7.0 - 18 MG/DL Creatinine 0.98 0.6 - 1.3 MG/DL  
 BUN/Creatinine ratio 37 (H) 12 - 20 GFR est AA >60 >60 ml/min/1.73m2 GFR est non-AA >60 >60 ml/min/1.73m2 Calcium 8.9 8.5 - 10.1 MG/DL Bilirubin, total 0.5 0.2 - 1.0 MG/DL  
 ALT (SGPT) 17 16 - 61 U/L  
 AST (SGOT) 38 10 - 38 U/L Alk. phosphatase 71 45 - 117 U/L Protein, total 7.9 6.4 - 8.2 g/dL Albumin 4.9 3.4 - 5.0 g/dL Globulin 3.0 2.0 - 4.0 g/dL A-G Ratio 1.6 0.8 - 1.7    
CBC W/O DIFF Collection Time: 10/01/20  6:35 AM  
Result Value Ref Range WBC 18.1 (H) 4.6 - 13.2 K/uL  
 RBC 3.89 (L) 4.70 - 5.50 M/uL  
 HGB 11.9 (L) 13.0 - 16.0 g/dL HCT 37.3 36.0 - 48.0 % MCV 95.9 74.0 - 97.0 FL  
 MCH 30.6 24.0 - 34.0 PG  
 MCHC 31.9 31.0 - 37.0 g/dL  
 RDW 14.6 (H) 11.6 - 14.5 % PLATELET 189 256 - 749 K/uL MPV 9.0 (L) 9.2 - 11.8 FL  
GLUCOSE, POC Collection Time: 10/01/20 12:31 PM  
Result Value Ref Range Glucose (POC) 307 (H) 70 - 110 mg/dL Chemistry Recent Labs 10/01/20 
4610 09/30/20 
0445 09/28/20 
2102 * 163* 94 * 143 141  
K 3.9 4.0 3.8  105 105 CO2 29 30 31 BUN 36* 21* 15  
CREA 0.98 0.83 0.72  
CA 8.9 8.8 8.8 AGAP 9 8 5 BUCR 37* 25* 21* AP 71 85 113  
TP 7.9 7.6 7.6 ALB 4.9 4.2 3.5 GLOB 3.0 3.4 4.1* AGRAT 1.6 1.2 0.9 Lactic Acid Lactic acid Date Value Ref Range Status 09/29/2020 0.8 0.4 - 2.0 MMOL/L Final  
 
Recent Labs  
  09/29/20 
0741 LAC 0.8 Liver Enzymes Protein, total  
Date Value Ref Range Status 10/01/2020 7.9 6.4 - 8.2 g/dL Final  
 
Albumin Date Value Ref Range Status 10/01/2020 4.9 3.4 - 5.0 g/dL Final  
 
Globulin Date Value Ref Range Status 10/01/2020 3.0 2.0 - 4.0 g/dL Final  
 
A-G Ratio Date Value Ref Range Status 10/01/2020 1.6 0.8 - 1.7   Final  
 
Alk. phosphatase Date Value Ref Range Status 10/01/2020 71 45 - 117 U/L Final  
 
Recent Labs 10/01/20 
9128 09/30/20 
0445 09/28/20 
2102 TP 7.9 7.6 7.6 ALB 4.9 4.2 3.5 GLOB 3.0 3.4 4.1* AGRAT 1.6 1.2 0.9 AP 71 85 113 CBC w/Diff Recent Labs 10/01/20 
0833 09/30/20 
0445 09/28/20 
2102 WBC 18.1* 16.1* 12.0  
RBC 3.89* 4.09* 4.24* HGB 11.9* 12.3* 13.3 HCT 37.3 38.9 39.9  356 374 GRANS  --   --  82* LYMPH  --   --  11* EOS  --   --  0 Cardiac Enzymes No results found for: CPK, CK, CKMMB, CKMB, RCK3, CKMBT, CKNDX, CKND1, SAMMY, TROPT, TROIQ, KATJA, TROPT, TNIPOC, BNP, BNPP  
 
BNP No results found for: BNP, BNPP, XBNPT Coagulation Recent Labs  
  09/28/20 2102 PTP 13.5 INR 1.0 APTT 33.0 Thyroid  No results found for: T4, T3U, TSH, TSHEXT, TSHEXT No results found for: T4  
 
Urinalysis Lab Results Component Value Date/Time  Color YELLOW 09/29/2020 08:56 AM  
 Appearance CLEAR 09/29/2020 08:56 AM  
 Specific gravity 1.009 09/29/2020 08:56 AM  
 pH (UA) 7.0 09/29/2020 08:56 AM  
 Protein Negative 09/29/2020 08:56 AM  
 Glucose Negative 09/29/2020 08:56 AM  
 Ketone Negative 09/29/2020 08:56 AM  
 Bilirubin Negative 09/29/2020 08:56 AM  
 Urobilinogen 0.2 09/29/2020 08:56 AM  
 Nitrites Negative 09/29/2020 08:56 AM  
 Leukocyte Esterase Negative 09/29/2020 08:56 AM  
 Epithelial cells 0 09/29/2020 08:56 AM  
 Bacteria 0 (A) 09/29/2020 08:56 AM  
 WBC 0 to 3 09/29/2020 08:56 AM  
 RBC 0 to 3 09/29/2020 08:56 AM  
  
 
Micro  Recent Labs  
  09/29/20 
0856 CULT No growth (<1,000 CFU/ML) Recent Labs  
  09/29/20 
4992 CULT No growth (<1,000 CFU/ML) Culture data during this hospitalization. All Micro Results Procedure Component Value Units Date/Time Christiano Chavarria [[id number]] Collected:  09/29/20 0856 Order Status:  Completed Specimen:  Urine from Clean catch Updated:  09/30/20 1931 Special Requests: NO SPECIAL REQUESTS Culture result: No growth (<1,000 CFU/ML) Echo 9/29/20: 
Result status: Final result · Contrast used: DEFINITY. · Left Ventricle: Normal cavity size, wall thickness and systolic function (ejection fraction normal). The estimated EF is 55 - 60%. There is mild (grade 1) left ventricular diastolic dysfunction E/E' ratio = 14.08. 
· Tricuspid Valve: Tricuspid valve not well visualized. No stenosis. Tricuspid regurgitation is inadequate for estimation of right ventricular systolic pressure. CTA chest 9/28/2020: 
1. No evidence of pulmonary embolism. 2.  Bilateral bronchial thickening possibly related to bronchitis. 3. There is also bilateral lower lung field interstitial coarsening and faint 
lung opacities which may be related to hypoaeration. Edema or developing 
infiltrates considered less likely. 
  
CT head 9/28/2020: No acute intracranial abnormality. 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 
greater than right. Atrophy with chronic ischemic changes. Images report reviewed by me: CT (Most Recent) (CT chest reviewed by me) Results from [facility] Encounter encounter on 09/28/20 CT HEAD WO CONT Narrative EXAM: CT head INDICATION: Headache. COMPARISON: None. TECHNIQUE: Axial CT imaging of the head was performed without intravenous 
contrast. Standard multiplanar coronal and sagittal reformatted images were 
obtained and are included in interpretation. One or more dose reduction techniques were used on this CT: automated exposure 
control, adjustment of the mAs and/or kVp according to patient size, and 
iterative reconstruction techniques. The specific techniques used on this CT 
exam have been documented in the patient's electronic medical record. Digital 
Imaging and Communications in Medicine (DICOM) format image data are available 
to nonaffiliated external healthcare facilities or entities on a secure, media 
free, reciprocally searchable basis with patient authorization for at least a 
12-month period after this study. _______________ FINDINGS: 
 
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 
bilateral frontal lobes, left greater than right. Associated ex vacuo dilatation 
of bilateral lateral ventricle frontal horns. Cerebral atrophy. Patchy 
periventricular, deep and subcortical white matter hypoattenuation which is 
nonspecific but likely represents chronic ischemic changes. No evidence of acute 
large vessel transcortical infarct or acute parenchymal hemorrhage. No midline 
shift or hydrocephalus. EXTRA-AXIAL SPACES AND MENINGES: There are no abnormal extra-axial fluid 
collections. CALVARIUM: Intact. SINUSES: Mild left frontal sinus opacification. OTHER: None. 
 
_______________ Impression IMPRESSION: 
 
No acute intracranial abnormality. 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 
greater than right. Atrophy with chronic ischemic changes. CXR reviewed by me: XR (Most Recent). CXR  reviewed by me and compared with previous CXR Results from [address] Encounter encounter on 09/28/20 XR CHEST PORT Narrative --------------------------------------------------------------------------- 
<<<<<<<<<           [facility]           >>>>>>>>>  
--------------------------------------------------------------------------- CLINICAL HISTORY:  Shortness of breath. COMPARISON EXAMINATIONS:  September 28, 2020. 
 
 
---  SINGLE FRONTAL VIEW OF THE CHEST  --- The cardiac mediastinal silhouette is stable. There is diffuse bilateral 
increased initial markings and diffuse bilateral infiltrates. No significant 
osseous abnormalities are identified.   
 
 
-------------- Impression Impression: 
-------------- Diffuse bilateral increased markings and diffuse bilateral infiltrates. Findings 
could represent interstitial and pulmonary edema. Atypical pneumonia could also 
have this appearance. There has been significant interval progression compared 
to previous. Ryder Whittaker MD 
10/1/2020

## 2020-10-01 NOTE — PROGRESS NOTES
0345: Patient arrived to room and shift assessment completed. 0350: Patients MEWS score is currently a 4 due to increased respirations of 35 and a HR of 108. Patient is currently being but back on Hi-Flow now. 0430: Patient is now resting confortablly with an SpO2 of 99% while on Hi-Flow 
 
0700: Bedside shift change report given to KEYANNA Mascorro RN (oncoming nurse) by DANGELO Silva RN (offgoing nurse). Report included the following information SBAR, Kardex, Procedure Summary, Intake/Output and MAR. Refugia Douse

## 2020-10-01 NOTE — PROGRESS NOTES
Problem: Falls - Risk of 
Goal: *Absence of Falls Description: Document Sheila Marlowann Fall Risk and appropriate interventions in the flowsheet. Outcome: Progressing Towards Goal 
Note: Fall Risk Interventions: 
  
 
Mentation Interventions: Bed/chair exit alarm, Door open when patient unattended Medication Interventions: Bed/chair exit alarm, Patient to call before getting OOB, Evaluate medications/consider consulting pharmacy Elimination Interventions: Bed/chair exit alarm, Call light in reach Problem: Pressure Injury - Risk of 
Goal: *Prevention of pressure injury Description: Document Alfredo Scale and appropriate interventions in the flowsheet. Outcome: Progressing Towards Goal 
Note: Pressure Injury Interventions: 
Sensory Interventions: Assess changes in LOC, Assess need for specialty bed, Discuss PT/OT consult with provider Moisture Interventions: Limit adult briefs, Absorbent underpads Activity Interventions: Pressure redistribution bed/mattress(bed type), PT/OT evaluation Mobility Interventions: HOB 30 degrees or less, Pressure redistribution bed/mattress (bed type) Nutrition Interventions: Document food/fluid/supplement intake Friction and Shear Interventions: HOB 30 degrees or less Problem: Tissue Perfusion - Cardiopulmonary, Altered Goal: *Optimize tissue perfusion Outcome: Progressing Towards Goal 
  
Problem: Tissue Perfusion - Cardiopulmonary, Altered Goal: *Absence of hypoxia Outcome: Progressing Towards Goal

## 2020-10-01 NOTE — PROGRESS NOTES
Problem: Dysphagia (Adult) Goal: *Acute Goals and Plan of Care (Insert Text) Description: Recommendations: 
Diet: NPO Meds: Non-oral 
Aspiration Precautions Oral Care TID Goals:  Patient will: 1. Tolerate PO trials with 0 s/s overt distress in 4/5 trials 2. Utilize compensatory swallow strategies/maneuvers (decrease bite/sip, size/rate, alt. liq/sol) with min cues in 4/5 trials 3. Perform oral-motor/laryngeal exercises to increase oropharyngeal swallow function with min cues 4. Complete an objective swallow study (i.e., MBSS) to assess swallow integrity, r/o aspiration, and determine of safest LRD, min A Outcome: Progressing Towards Goal 
 
SPEECH LANGUAGE PATHOLOGY DYSPHAGIA TREATMENT Patient: Karyna Salas (39 y.o. male) Date: 10/1/2020 Diagnosis: COPD exacerbation ([facility] [address] [J44.1] Acute respiratory distress [R06.03] Chronic obstructive pulmonary disease with acute exacerbation ([facility] [address] Precautions: Fall, Aspiration, DNR, DNI, Seizure PLOF: Independent ASSESSMENT: 
Followed up with dysphagia intervention. Patient A&O to self only, decreased command following. Patient presented with honey thick liquid and puree trials via tsp; demo delayed oral bolus manipulation, decreased hyolaryngeal elevation, delayed wet cough. Recommend patient remain NPO with strict aspiration precautions. Recommend MBSS next day to further assess swallow integrity and rule out aspiration. SLP will continue to follow as indicated. Progression toward goals: 
[]         Improving appropriately and progressing toward goals [x]         Improving slowly and progressing toward goals 
[]         Not making progress toward goals and plan of care will be adjusted PLAN: 
Recommendations and Planned Interventions: 
NPO, MBSS Patient continues to benefit from skilled intervention to address the above impairments. Continue treatment per established plan of care. Discharge Recommendations: To Be Determined SUBJECTIVE:  
Patient stated Can I have more? . OBJECTIVE:  
Cognitive and Communication Status: 
Neurologic State: Alert Orientation Level: Oriented to person, Disoriented to place, Disoriented to situation, Disoriented to time Cognition: Decreased command following, Decreased attention/concentration, Memory loss Perception: Appears intact Perseveration: Perseverates during mobility Safety/Judgement: Awareness of environment Dysphagia Treatment: 
Oral Assessment: 
Oral Assessment Labial: No impairment Dentition: Edentulous Oral Hygiene: [address] Lingual: Decreased rate, Decreased strength Velum: No impairment Mandible: No impairment P.O. Trials: 
 Patient Position: K 62 Vocal quality prior to P.O.: Low volume Consistency Presented: Ice chips How Presented: SLP-fed/presented, Spoon Bolus Acceptance: No impairment Bolus Formation/Control: Impaired Type of Impairment: Delayed, Mastication Propulsion: Delayed (# of seconds) Oral Residue: None Initiation of Swallow: Delayed (# of seconds) Laryngeal Elevation: Weak Aspiration Signs/Symptoms: Change vocal quality, Increase in RR, Strong cough, Facial redness Pharyngeal Phase Characteristics: Altered vocal quality, Audible swallow, Poor endurance Effective Modifications: None Cues for Modifications: Moderate Oral Phase Severity: Mild-moderate Pharyngeal Phase Severity : Severe PAIN: 
Pain level pre-treatment: 0/10 Pain level post-treatment: 0/10 After treatment:  
[]              Patient left in no apparent distress sitting up in chair 
[x]              Patient left in no apparent distress in bed 
[x]              Call bell left within reach [x]              Nursing notified 
[]              Family present 
[]              Caregiver present 
[]              Bed alarm activated COMMUNICATION/EDUCATION:  
[x] Aspiration precautions; swallow safety; compensatory techniques [x]        Patient unable to participate in education; education ongoing with staff 
[]  Posted safety precautions in patient's room. [] Oral-motor/laryngeal strengthening exercises KATE Jamil Time Calculation: 7 mins

## 2020-10-01 NOTE — PROGRESS NOTES
Chart reviewed. Pt admitted to tele currently, pt was transferred from ICU. Pt noted with confusion. Palliative following. Noted pts son is MPOA. PT/OT eval orders noted, pt participating minimally. Anticipate pt dispo to SNF v LTC. Local referrals sent for SNF and LTC. Will need to discuss options with son. Pt has neg covid test. CM will cont to follow [phone]

## 2020-10-01 NOTE — PROGRESS NOTES
RT called to bedside due to patient tearing up HFNC. Nurse had placed patient on nc and patient is curerntly 97% on 2pm nc

## 2020-10-01 NOTE — ROUTINE PROCESS
TRANSFER - IN REPORT: 
 
Verbal report received from WILMER Orta RN (name) on Diya Tam  being received from 3S (unit) for routine progression of care Report consisted of patients Situation, Background, Assessment and  
Recommendations(SBAR). Information from the following report(s) SBAR, Kardex, Procedure Summary, Intake/Output and MAR was reviewed with the receiving nurse. Opportunity for questions and clarification was provided. Assessment completed upon patients arrival to unit and care assumed.

## 2020-10-01 NOTE — PROGRESS NOTES
TRANSFER - IN REPORT: 
 
Verbal report received from Jean Marie RN(name) on Neelima Irvin  being received from ICU(unit) for routine progression of care Report consisted of patients Situation, Background, Assessment and  
Recommendations(SBAR). Information from the following report(s) SBAR, ED Summary, Intake/Output, MAR and Recent Results was reviewed with the receiving nurse. Opportunity for questions and clarification was provided. Assessment completed upon patients arrival to unit and care assumed.

## 2020-10-01 NOTE — PROGRESS NOTES
Pharmacy Note: Zosyn Pt was on extended infusion of Zosyn while in the intensive care unit. Prior order:  Zosyn 3.375 grams IV q8h (over 240 minutes). Now that patient has been transferred to , Zosyn has been adjusted to standard intermittent dosing. Dose adjusted to:  Zosyn 3.375 grams IV q6h (infuse over 30 minutes) Scr = 0.83   CrCl = 71 ml/min Pharmacy to continue to monitor daily. Mayur Younger, PharmD   [phone]

## 2020-10-01 NOTE — PROGRESS NOTES
Hospitalist Progress Note Patient: Juani William MRN: [id number]  CSN: [id number] YOB: 1940  Age: 68 y.o. Sex: male DOA: 9/28/2020 LOS:  LOS: 2 days Assessment/Plan Patient Active Problem List  
Diagnosis Code  Tobacco dependence F17.200  Chronic obstructive pulmonary disease with acute exacerbation (HCC) J44.1  Supplemental oxygen dependent Z99.81  
 Suspected 2019 novel coronavirus infection Z20.828  
 Hypoxia R09.02  
 CAD (coronary artery disease) I25.10  Combative behavior R46.89  
 Alcohol abuse F10.10  Dementia ([facility] [address] F03.90  
 BRANDEN (obstructive sleep apnea) G47.33  
 Acute respiratory distress R06.03  
 Acute respiratory failure with hypoxia and hypercapnia (HCC) J96.01, J96.02  
 Altered mental status R41.82  
  
 
 
 
69 y/o male with COPD (2L supplemental oxygen dependent), BRANDEN, CAD, alcohol abuse, and tobacco dependence is admitted with a COPD exacerbation, hypoxia. He required to be started on high flow oxygen and ICU admission. He is now on oxygen by nasal cannula, answering to questions. COVID 19 rapid test negative. Acute on chronic respiratory failure with hypoxia- 
Secondary to COPD exacerbation, now improved, he is on oxygen by nasal cannula. CTA chest negative for PE, bronchial wall thickening and possible basilar atelectasis versus hypoinflation. Could not tolerate BiPAP in ER due to sedation/dementia. COPD exacerbation- 
New with IV Solu-Medrol, duo nebs, Pulmicort. Continue with empiric antibiotics. Aspiration pneumonia - On zosyn Dysphagia- 
Seen by SLP, recommend n.p.o., for MBS. CAD- No chest pain, history of stent placement. Echo with EF of 55 to 07%, grade 1 diastolic dysfunction. Medications on hold secondary to dysphagia and n.p.o. Alcohol use- 
Continue with CIWA protocol and banana bag. Tobacco abuse- 
Nicotine patch Continue with Dilantin, change dose to IV. Elevated blood sugar secondary to steroid use, continue with sliding scale insulin. DVT prophylaxis with Lovenox, Disposition : TBD Review of systems General: No fevers or chills. Cardiovascular: No chest pain or pressure. No palpitations. Pulmonary: see above. Gastrointestinal: No nausea, vomiting. Physical Exam: 
General: Awake, cooperative, no acute distress   
HEENT: NC, Atraumatic. PERRLA, anicteric sclerae. Lungs: CTA Bilaterally. No Wheezing/Rhonchi/Rales. Heart:  S1 S2,  No murmur, No Rubs, No Gallops Abdomen: Soft, Non distended, Non tender.  +Bowel sounds, Extremities: No c/c/e Psych:   Not anxious or agitated. Neurologic:  No acute neurological deficit. Vital signs/Intake and Output: 
Visit Vitals /62 Pulse 90 Temp 97.7 °F (36.5 °C) Resp 20 Ht 5' 8\" (1.727 m) Wt 72.9 kg (160 lb 11.5 oz) SpO2 94% BMI 24.44 kg/m² Current Shift:  No intake/output data recorded. Last three shifts:  09/30 0701 - 10/01 1900 In: 1117.5 [I.V.:1117.5] Out: 600 [Urine:600] Labs: Results:  
   
Chemistry Recent Labs 10/01/20 
9699 09/30/20 0445 09/28/20 2102 * 163* 94 * 143 141  
K 3.9 4.0 3.8  105 105 CO2 29 30 31 BUN 36* 21* 15  
CREA 0.98 0.83 0.72  
CA 8.9 8.8 8.8 AGAP 9 8 5 BUCR 37* 25* 21* AP 71 85 113  
TP 7.9 7.6 7.6 ALB 4.9 4.2 3.5 GLOB 3.0 3.4 4.1* AGRAT 1.6 1.2 0.9  
  
CBC w/Diff Recent Labs 10/01/20 
0830 09/30/20 
0445 09/28/20 
2102 WBC 18.1* 16.1* 12.0  
RBC 3.89* 4.09* 4.24* HGB 11.9* 12.3* 13.3 HCT 37.3 38.9 39.9  356 374 GRANS  --   --  82* LYMPH  --   --  11* EOS  --   --  0 Cardiac Enzymes Recent Labs  
  09/29/20 
1504 09/28/20 2102  71 CKND1 2.3 4.1* Coagulation Recent Labs  
  09/28/20 2102 PTP 13.5 INR 1.0 APTT 33.0 Lipid Panel No results found for: CHOL, CHOLPOCT, CHOLX, [facility], HDL, HDLP, LDL, LDLC, DLDLP, 276718, VLDLC, VLDL, TGLX, TRIGL, TRIGP, TGLPOCT, CHHD, CHHDX  
BNP No results for input(s): BNPP in the last 72 hours. Liver Enzymes Recent Labs 10/01/20 
0600 TP 7.9 ALB 4.9 AP 71 Thyroid Studies No results found for: T4, T3U, TSH, TSHEXT, TSHEXT Procedures/imaging: see electronic medical records for all procedures/Xrays and details which were not copied into this note but were reviewed prior to creation of Plan

## 2020-10-01 NOTE — PROGRESS NOTES
Physician Progress Note Lynne Marcial 
CSN #:                  [id number] :                       1940 ADMIT DATE:       2020 8:58 PM 
[address] [place] DATE: 
RESPONDING 
PROVIDER #:        Eva Vera MD 
 
 
 
 
QUERY TEXT: 
 
Dear Hospitalist: 
 
Pt admitted with  COPD exacerbation  Pt noted to have  pneumonia documented . If possible, please document in the progress notes and discharge summary if you are evaluating and/or treating any of the following: The medical record reflects the following: 
 
Risk Factors: copd exacerbation Clinical Indicators:  dementia, documented possible CAP,  WBC 16.1. Chest CT. Bilateral bronchial thickening possibly related to bronchitis. 3. There is also bilateral lower lung field interstitial coarsening and faint 
lung opacities which may be related to hypoaeration. Edema or developing 
infiltrates considered less likely. ? 
Treatment : Rocephin IV, Zithromax IV Thank- You Osmin Barlow RN [facility] [phone] Options provided: 
-- Gram negative pneumonia 
-- Gram positive pneumonia -- Bacterial pneumonia 
-- Aspiration pneumonia -- Bronchitis -- Pneumonia ruled out 
-- Other - I will add my own diagnosis -- Disagree - Not applicable / Not valid -- Disagree - Clinically unable to determine / Unknown 
-- Refer to Clinical Documentation Reviewer PROVIDER RESPONSE TEXT: 
 
This patient has aspiration pneumonia.  
 
Query created by: Chidi Lyle on 2020 12:08 PM 
 
 
Electronically signed by:  Eva Vera MD 10/1/2020 7:02 PM

## 2020-10-01 NOTE — PROGRESS NOTES
Problem: Mobility Impaired (Adult and Pediatric) Goal: *Acute Goals and Plan of Care (Insert Text) Description: PT goals to be met in 1-7 days: Pt will be able to perform supine<>sit S for transfers at home. Pt will be able to perform sit<>stand S for increased ability to transfer at home safely. Pt will be able to participate in gt training >100' w/ RW, O2 support, GB and SBA for improved ability in home upon d/c. Pt will be able to perform stair training >2 steps, B/U rail and CGA to obtain safe entry into home upon d/c. Pt will be educated regarding HEP per MD protocol for optimal AROM/strength outcomes. Outcome: Progressing Towards Goal 
 PHYSICAL THERAPY TREATMENT Patient: Lucie Wynn (44 y.o. male) Date: 10/1/2020 Diagnosis: COPD exacerbation ([facility] [address] [J44.1] Acute respiratory distress [R06.03] Chronic obstructive pulmonary disease with acute exacerbation ([facility] [address] Precautions: Fall, Aspiration, DNR, DNI, Seizure PLOF: ambulatory without AD 
 
ASSESSMENT: Pt confused No assistance needed for bed mobility. CGA for sit to stand. Ambulated 50ft on RA without AD, reciprocal gt pattern, no LOB or path deviations. SpO2 77% on RA upon returning to room, donned nasal cannula at 2L and SpO2 increased to 84% in 5 seconds. Pt self supine in bed, rails up and bed alarm on. Progression toward goals:  
[]      Improving appropriately and progressing toward goals [x]      Improving slowly and progressing toward goals 
[]      Not making progress toward goals and plan of care will be adjusted PLAN: 
Patient continues to benefit from skilled intervention to address the above impairments. Continue treatment per established plan of care. Discharge Recommendations:  [address], TBD Further Equipment Recommendations for Discharge:  N/A  
 
SUBJECTIVE:  
Patient stated Can I go smoke.  OBJECTIVE DATA SUMMARY:  
Critical Behavior: 
Neurologic State: Alert, Confused Orientation Level: Oriented to person, Oriented to place, Oriented to situation, Disoriented to time Cognition: Follows commands, Appropriate decision making, Appropriate safety awareness, Appropriate for age attention/concentration Safety/Judgement: Awareness of environment Functional Mobility Training: 
Bed Mobility: 
 
Supine to Sit: Supervision Sit to Supine: Supervision Scooting: Contact guard assistance(lateral scooting alongside bed) Transfers: 
Sit to Stand: Contact guard assistance Stand to Sit: Contact guard assistance Balance: 
Sitting: Intact Standing: Intact; Without support Ambulation/Gait Training: 
Distance (ft): 50 Feet (ft) Assistive Device: Gait belt Pain: 
Pain level pre-treatment: 0/10 Pain level post-treatment: 0/10 Pain Intervention(s): Medication (see MAR); Rest, Ice, Repositioning Response to intervention: Nurse notified, See doc flow Activity Tolerance:  
Fair Please refer to the flowsheet for vital signs taken during this treatment. After treatment:  
[] Patient left in no apparent distress sitting up in chair 
[x] Patient left in no apparent distress in bed 
[x] Call bell left within reach 
[] Nursing notified 
[] Caregiver present [x] Bed alarm activated 
[] SCDs applied COMMUNICATION/EDUCATION:  
[x]         Role of Physical Therapy in the acute care setting. []         Fall prevention education was provided and the patient/caregiver indicated understanding. []         Patient/family have participated as able in working toward goals and plan of care. []         Patient/family agree to work toward stated goals and plan of care. [x]         Patient understands intent and goals of therapy, but is neutral about his/her participation. []         Patient is unable to participate in stated goals/plan of care: ongoing with therapy staff. 
[]         Other: 
 
   
Arslan Lieberman PTA Time Calculation: 15 mins

## 2020-10-01 NOTE — PROGRESS NOTES
Verbal shift change report given to RANDY Reeves (oncoming nurse) by Josh Rivas RN (offgoing nurse). Report included the following information SBAR, Kardex, Intake/Output, MAR and Recent Results.

## 2020-10-01 NOTE — PROGRESS NOTES
1920 Assumed patient care at this time. Report received from Alexandria Raines 49 Shift assessment completed and documented in flow sheets at this time. 1944 PRN Haldol administered for agitation. Patient not listening to staff about getting back in bed so that he will not fall. Patient attempting to ambulate in the hallway. 1958 PRN Benadryl administered for itching. 2355 PRN Haldol administered for agitation. 0220 PRN Benadryl administered for itching. 1500 Paged respiratory regarding a breathing treatment. Patient becoming increasingly coarse and breathing becoming more labored. Respirations 26, oxygen saturation 100%. 2077 Respiratory in to do patient's breathing treatment. Respiratory therapist, Olga Serra, stated the same thing happened yesterday to patient that resulted in him being transferred to [facility]. Previous order for 40 mg IV Lasix helped. Respiratory will put patient back on Hi flow oxygen when treatment is completed. 4045 Paged Dr. Caio Shetty at this time regarding patient's current respiratory status. 0600 Sp[address] to Dr. Caio Shetty at this time. Telephone order with read back for one time dose of 40 mg Lasix IV. 7519 One time dose of Lasix administered. [facility] with respiratory therapist, Mono Wells, about putting patient back on Hi flow oxygen. Stated she would put patient back on Hi flow instead of the now 4 L NC.  
 
 
8292 Bedside and verbal shift change report given to Mauro Crowell RN (oncoming nurse) by Rita Kirby RN (offgoing nurse). Report included the following information: SBAR, Kardex, MAR, and recent results.

## 2020-10-01 NOTE — PALLIATIVE CARE
This NP in to patient's room to assess for improved cognitive status. Patient remains confused and is picking at surroundings including telemetry box. No change in goals of care: DNR/DNI

## 2020-10-01 NOTE — ROUTINE PROCESS
TRANSFER - OUT REPORT: 
 
Verbal report given to Libertad Pradhan (name) on Leonid Killian  being transferred to (unit) for change in patient condition(respiratory decline) Report consisted of patients Situation, Background, Assessment and  
Recommendations(SBAR). Information from the following report(s) SBAR, Kardex, Intake/Output, MAR and Recent Results was reviewed with the receiving nurse. Lines:  
Peripheral IV 09/28/20 Right Antecubital (Active) Site Assessment Clean, dry, & intact 09/30/20 2022 Phlebitis Assessment 0 09/30/20 2022 Infiltration Assessment 0 09/30/20 2022 Dressing Status Clean, dry, & intact 09/30/20 2022 Dressing Type Transparent;Tape 09/30/20 2022 Hub Color/Line Status Infusing;Patent 09/30/20 2022 Action Taken Open ports on tubing capped 09/30/20 2022 Alcohol Cap Used Yes 09/30/20 2022 Peripheral IV 09/29/20 Left;Upper Arm (Active) Site Assessment Clean, dry, & intact 09/30/20 2022 Phlebitis Assessment 0 09/30/20 2022 Infiltration Assessment 0 09/30/20 2022 Dressing Status Clean, dry, & intact 09/30/20 2022 Dressing Type Transparent;Tape 09/30/20 2022 Hub Color/Line Status Infusing;Patent 09/30/20 2022 Action Taken Open ports on tubing capped 09/30/20 2022 Alcohol Cap Used Yes 09/30/20 2022 Opportunity for questions and clarification was provided. Patient transported with: 
 Monitor O2 @ 5 liters

## 2020-10-02 NOTE — PROGRESS NOTES
1920 Assumed patient care at this time. Report received from Alexandria PATEL 49 Shift assessment completed and documented in flow sheets at this time. 1944 PRN Haldol administered for agitation. 1958 PRN Benadryl administered for itching. 2355 PRN Haldol administered for agitation.

## 2020-10-02 NOTE — PROGRESS NOTES
1905 Assumed patient care at this time. Report received from Edda Arriola RN.  
0355 Patient's son in room Provided with update. 2000 Shift assessment completed and documented in flow sheets at this time. Bedside and verbal shift change report given to Waylon Tapia RN (oncoming nurse) by Miles Sims RN (offgoing nurse). Report included the following information: SBAR, Kardex, MAR, and recent results.

## 2020-10-02 NOTE — PROGRESS NOTES
Hospitalist Progress Note Patient: Dante Pan MRN: [id number]  CSN: [id number] YOB: 1940  Age: 68 y.o. Sex: male DOA: 9/28/2020 LOS:  LOS: 3 days Chief Complaint: Ac resp failure Assessment/Plan  
 
69 y/o male with COPD (2L supplemental oxygen dependent), BRANDEN, CAD, alcohol abuse, and tobacco dependence is admitted with a COPD exacerbation, hypoxia. He required to be started on high flow oxygen , moved from ICU yesterday 
  
He is on high flow 02 COVID 19 rapid test negative. 
  
Acute on chronic respiratory failure with hypoxia- 
Secondary to COPD exacerbation, continue steroids IV, start lasix gently as pulm edema on CXR yesteday and still on high flow CTA chest negative for PE, continue IV abx empirically for pneumonia Hypokalemia-2 runs IV Kcl Hyperglycemia with steroids, check AqC, use SSI, lantus 
  
Aspiration pneumonia - On zosyn 
  Dysphagia- 
Seen by SLP, recommend n.p.o., for MBS today, hold PO meds Metabolic encephalopathy with underlying dementia 
  
CAD- No chest pain, history of stent placement. Echo with EF of 55 to 50%, grade 1 diastolic dysfunction.   
  
Alcohol use- 
Continue with CIWA protocol  
  
Tobacco abuse- 
Nicotine patch 
  
Continue with Dilantin, now on IV Prognosis guarded DNR Daily BMP and CBC 
 
  
DVT prophylaxis with Lovenox Disposition : 
Patient Active Problem List  
Diagnosis Code  Tobacco dependence F17.200  Chronic obstructive pulmonary disease with acute exacerbation (HCC) J44.1  Supplemental oxygen dependent Z99.81  
 Suspected 2019 novel coronavirus infection Z20.828  
 Hypoxia R09.02  
 CAD (coronary artery disease) I25.10  Combative behavior R46.89  
 Alcohol abuse F10.10  Dementia ([facility] [address] F03.90  
 BRANDEN (obstructive sleep apnea) G47.33  
 Acute respiratory distress R06.03  
 Acute respiratory failure with hypoxia and hypercapnia (HCC) J96.01, J96.02  
  Altered mental status R41.82 Subjective: He is confused Wearing high flow 02 
delerious No distress noted Review of systems: UTO reliable hx or ROS Vital signs/Intake and Output: 
Visit Vitals /74 (BP 1 Location: Right arm, BP Patient Position: At rest) Pulse 94 Temp 97.9 °F (36.6 °C) Resp 26 Ht 5' 8\" (1.727 m) Wt 78.5 kg (173 lb) SpO2 100% BMI 26.30 kg/m² Current Shift:  No intake/output data recorded. Last three shifts:  09/30 1901 - 10/02 0700 In: 1491.2 [I.V.:1491.2] Out: 400 [Urine:400] Exam: 
 
General: elderly confused Wm, NAD, on high flow 02 CVS:Regular rate and rhythm, no M/R/G, S1/S2 heard, no thrill Lungs:Coarse rhonchi, dec BS bases, few wheezes Abdomen: Soft, Nontender, No distention, Normal Bowel sounds, No hepatomegaly Extremities: No C/C/E, pulses palpable 2+ Skin:normal texture and turgor, no rashes, no lesions Neuro:grossly normal , follows commands Psych:confused Labs: Results:  
   
Chemistry Recent Labs 10/02/20 
0766 10/01/20 
3053 09/30/20 
9748 * 189* 163*  146* 143  
K 3.3* 3.9 4.0  
 108 105 CO2 29 29 30 BUN 41* 36* 21* CREA 0.91 0.98 0.83 CA 9.4 8.9 8.8 AGAP 8 9 8 BUCR 45* 37* 25* AP 60 71 85  
TP 7.7 7.9 7.6 ALB 5.0 4.9 4.2 GLOB 2.7 3.0 3.4 AGRAT 1.9* 1.6 1.2  
  
CBC w/Diff Recent Labs 10/02/20 
0771 10/01/20 
8470 09/30/20 
5712 WBC 14.1* 18.1* 16.1*  
RBC 3.58* 3.89* 4.09* HGB 10.8* 11.9* 12.3*  
HCT 34.3* 37.3 38.9  316 356 Cardiac Enzymes Recent Labs  
  09/29/20 
1504  CKND1 2.3 Coagulation No results for input(s): PTP, INR, APTT, INREXT in the last 72 hours. Lipid Panel No results found for: CHOL, CHOLPOCT, CHOLX, CHLST, CHOLV, 170046, HDL, HDLP, LDL, LDLC, DLDLP, 403611, VLDLC, VLDL, TGLX, TRIGL, TRIGP, TGLPOCT, CHHD, CHHDX  
BNP No results for input(s): BNPP in the last 72 hours. Liver Enzymes Recent Labs 10/02/20 
0435 TP 7.7 ALB 5.0 AP 60 Thyroid Studies No results found for: T4, T3U, TSH, TSHEXT Procedures/imaging: see electronic medical records for all procedures/Xrays and details which were not copied into this note but were reviewed prior to creation of Plan Ronny Aguiar MD

## 2020-10-02 NOTE — PROGRESS NOTES
Speech Therapy Note: 
 
Patient scheduled for MBSS today, however it is unable to be completed as patient has been returned to [facility] due to respiratory distress. Will re-attempt MBSS later today as medically indicated. Seda Worley M.S., CCC-SLP Speech Language Pathologist

## 2020-10-02 NOTE — PROGRESS NOTES
0718:Received verbal bedside report from off going nurse WILMER Bautista R.N. Patient care received. Patient alert and oriented to self and place. Patient resting in bed denies pain. Patient stable. Call light with in reach bed in lowest position.

## 2020-10-02 NOTE — PROGRESS NOTES
Pulmonary Specialists Pulmonary, Critical Care, and Sleep Medicine Name: Niyah Giron MRN: [id number] : 1940 Hospital: [facility] [place] Date: 10/2/2020  Room: [address] [place] [address] Note Consult requesting physician: Dr. Betty Sims Reason for Consult: COPD exacerbation IMPRESSION:  
Acute respiratory failure with hypoxia and hypercapnia (HCC) J96.01, J96.02 Chronic obstructive pulmonary disease with acute exacerbation (HCC) J44.1 Altered mental status R41.82 Dementia ([facility] F03.90 · · Patient Active Problem List  
Diagnosis Code  Tobacco dependence F17.200  Chronic obstructive pulmonary disease with acute exacerbation (HCC) J44.1  Supplemental oxygen dependent Z99.81  
 Suspected 2019 novel coronavirus infection Z20.828  
 Hypoxia R09.02  
 CAD (coronary artery disease) I25.10  Combative behavior R46.89  
 Alcohol abuse F10.10  Dementia ([facility] F03.90  
 BRANDEN (obstructive sleep apnea) G47.33  
 Acute respiratory distress R06.03  
 Acute respiratory failure with hypoxia and hypercapnia (HCC) J96.01, J96.02  
 Altered mental status R41.82  
 
 
 
· Code status: DNR/DNI  
  
RECOMMENDATIONS:  
Respiratory: Hx COPD, chronic hypoxic respiratory failure on home O2, active smoker, BRANDEN intolerant to CPAP. Admitted with acute on chronic hypoxic and hypercapnic respiratory failure, COPD exacerbation. CTA chest: Negative for PE. Bronchial wall thickening and possible basilar atelectasis versus hypoinflation. Could not tolerate BiPAP in ER due to agitation/dementia. Initially required HFNC. 
DNR/DNI status. HFNC changed to nasal cannula in ICU. Transferred to medical floor on 2020. Repeat CXR 10/1/2020: Increased diffuse bilateral interstitial marking and infiltrates. May represent edema or pneumonia. Worsening hypoxemia, now requiring HFNC 30 LPM. Alert awake but confused. At risk for aspiration. Keep n.p.o. for ST evaluation. Not in respiratory distress. Leukocytosis improving. No fever. Hemodynamically stable. Continue DuoNeb 4 times daily, Pulmicort twice daily, DuoNeb as needed. Continue Solu-Medrol 40 mg IV every 8 hour. Continue nicotine patch. Antibiotics: Continue Zosyn for bronchitis/possible aspiration pneumonia. Keep SPO2 >=92%. HOB 30 degree elevation all the time. Aggressive pulmonary toileting. Aspiration precautions. Incentive spirometry. Overall poor prognosis. DNR/DNI status. Recommend to continue to work with palliative care team and family. The following issues are not being actively managed by me and last addressed/updated on 9/30/2020 when patient was in ICU; defer to hospitalist and respective consultant. 
________________________________________________________________________________ CVS: Hx MI in 03/2020, not a candidate for CABG, 6 stent placed. Echo 9/29/2020: LVEF 55 to 60%. Grade 1 DD. Continue Plavix, Nitro-Bid, Lipitor. ID:  
Rapid COVID19 negative. COVID19 9/29/2020: Pending. CTA chest with possible bronchitis. UA negative. Urine culture pending. Not able to collect the sputum sample. Lactic acid normal.  No fever or leukocytosis. Antibiotic: Continue Zosyn for acute bronchitis. Deescalate antibiotic when appropriate. Hematology/Oncology: No acute issues. Renal: Normal creatinine. Monitor electrolytes, renal function and urine output. GI/: Continue Flomax for BPH. Will DC Fitzgerald catheter and place condom catheter. Endocrine: Monitor BS. Monitor for any hypoglycemia. Neurology: Hx dementia. Admitted with severe agitation. Required Haldol in ER and last night. Start Seroquel 25 mg p.o. nightly. Check Dilantin level and change Dilantin to IV equivalent dose, by pharmacist. 
CT head ordered. Toxicology: Hx alcohol abuse. Banana bag daily.   Continue to follow [facility] scoring and management. Pain/Sedation: No acute issues. Skin/Wound: No acute issues. Electrolytes: Replace electrolytes per ICU electrolyte replacement protocol. IVF: None. Nutrition: N.p.o.  Strict aspiration precautions. ST evaluation was not completed due to mental status yesterday. Speech therapist to follow. Prophylaxis: DVT Prophylaxis: SCD/Lovenox. GI Prophylaxis: Low risk for stress ulcer. Tonye Cogan Restraints: none Lines/Tubes: PIV Fitzgerald: Placed in ER 9/29/2020 -to be discontinued 9/30/2020 after placing condom catheter. Advance Directive/Palliative Care: consulted, discussed with palliative care team as well 
____________________________________________________________________________ Will defer respective systems problem management to primary and other respective consultant and follow patient from pulmonary perspective. Further recommendations will be based on the patient's response to recommended treatment and results of the investigation ordered. Care of plan d/w patient, RN, RT, hospitalist team. 
 
Moderate complexity decision making was performed during the evaluation of this patient at high risk for decompensation with multiple organ involvement. Subjective/History of Present Illness:  
 
Patient is a 68 y.o. male with PMHx significant for dementia, CAD, alcohol abuse, tobacco dependence/smoker, BRANDEN intolerant with CPAP, chronic hypoxic respiratory failure on home O2, COPD; admitted with combative behavior likely due to dementia, COPD exacerbation, acute hypoxic and hypercapnic respiratory failure. Patient used to live other state, not able to take care of him and so recently moved to live with his son in [place]. He had MRI in 03/2020, not a candidate for CABG, had 6 stent placed, per history. He still an active smoker and alcohol abuser. Hx BRANDEN but intolerant to CPAP and not using CPAP. He is chronic hypoxic respiratory failure on home O2, but an active smoker. Patient admitted to ICU in altered mental status/severe agitation along with acute hypoxic and hypercapnic respiratory failure with COPD exacerbation, on HF NC. 
 
10/2/2020: 
Transferred out of ICU on 9/30/2020. On 10/1/2020, CXR worsened. Hypoxemia worsened, now on 30 LPM HF NC. 
N.p.o. per speech therapy recommendation. Alert, awake and cooperative but slightly confused. Hemodynamically stable. Fever. Leukocytosis improving. [facility] was not called for any issues overnight. No overnight issues reported. I/O last 24 hrs: Intake/Output Summary (Last 24 hours) at 10/2/2020 1105 Last data filed at 10/2/2020 4699 Gross per 24 hour Intake 1291.22 ml Output  Net 1291.22 ml History taken from patient's son Joao Ulloa and  EMR Review of Systems: 
ROS not obtained due to patient factor. No Known Allergies Past Medical History:  
Diagnosis Date  CAD (coronary artery disease)  Chronic obstructive pulmonary disease (HCC)   
 on 2 liters at home  Dementia ([address]  BRANDEN (obstructive sleep apnea) Past Surgical History:  
Procedure Laterality Date  CARDIAC SURG PROCEDURE UNLIST    
 stents x6 Social History Tobacco Use  Smoking status: Current Every Day Smoker Packs/day: 2.00  Smokeless tobacco: Never Used Substance Use Topics  Alcohol use: Yes Alcohol/week: 7.0 standard drinks Types: 7 Cans of beer per week Family History Problem Relation Age of Onset  Heart Disease Father Prior to Admission medications Medication Sig Start Date End Date Taking? Authorizing Provider  
clopidogreL (Plavix) 75 mg tab Take 75 mg by mouth daily. Indications: stent placement   Yes Other, MD Evelin  
potassium chloride (KLOR-CON) 10 mEq tablet Take 10 mEq by mouth daily. Yes Other, MD Evelin  
tamsulosin (Flomax) 0.4 mg capsule Take 0.4 mg by mouth.  Indications: enlarged prostate with urination problem   Yes Kiko, MD Evelin  
 doxycycline (ADOXA) 100 mg tablet Take 100 mg by mouth two (2) times a day. Yes Kiko, MD Evelin  
phenytoin ER (Dilantin Extended) 100 mg ER capsule Take 100 mg by mouth two (2) times a day. Indications: epilepsy   Yes Kiko, MD Evelin  
furosemide (Lasix) 40 mg tablet Take 40 mg by mouth daily. Indications: visible water retention   Yes Kiko, MD Evelin  
 
Current Facility-Administered Medications Medication Dose Route Frequency  furosemide (LASIX) injection 20 mg  20 mg IntraVENous BID  
 0.45% sodium chloride infusion  50 mL/hr IntraVENous CONTINUOUS  
 insulin glargine (LANTUS) injection 10 Units  10 Units SubCUTAneous DAILY  methylPREDNISolone (PF) (Solu-MEDROL) injection 40 mg  40 mg IntraVENous Q8H  
 insulin lispro (HUMALOG) injection   SubCUTAneous Q6H  
 [Held by provider] QUEtiapine (SEROquel) tablet 25 mg  25 mg Oral QHS  albuterol-ipratropium (DUO-NEB) 2.5 MG-0.5 MG/3 ML  3 mL Nebulization QID RT  
 budesonide (PULMICORT) 500 mcg/2 ml nebulizer suspension  500 mcg Nebulization BID RT  
 phenytoin (DILANTIN) injection 100 mg  100 mg IntraVENous BID  piperacillin-tazobactam (ZOSYN) 3.375 g in 0.9% sodium chloride (MBP/ADV) 100 mL MBP  3.375 g IntraVENous Q6H  
 sodium chloride (NS) flush 5-40 mL  5-40 mL IntraVENous Q8H  
 enoxaparin (LOVENOX) injection 40 mg  40 mg SubCUTAneous DAILY  [Held by provider] clopidogreL (PLAVIX) tablet 75 mg  75 mg Oral DAILY  [Held by provider] tamsulosin (FLOMAX) capsule 0.4 mg  0.4 mg Oral DAILY  nicotine (NICODERM CQ) 21 mg/24 hr patch 1 Patch  1 Patch TransDERmal DAILY  [Held by provider] atorvastatin (LIPITOR) tablet 40 mg  40 mg Oral DAILY  0.9% sodium chloride 1,000 mL with mvi, adult no. 4 with vit K 10 mL, thiamine 317 mg, folic acid 1 mg infusion   IntraVENous DAILY Objective:  
Vital Signs:   
Visit Vitals /76 Pulse 92 Temp 98 °F (36.7 °C) Resp 22 Ht 5' 8\" (1.727 m) Wt 78.5 kg (173 lb) SpO2 95% BMI 26.30 kg/m² O2 Device: (P) Hi flow nasal cannula O2 Flow Rate (L/min): (P) 30 l/min Temp (24hrs), Av °F (36.7 °C), Min:97.7 °F (36.5 °C), Max:98.4 °F (36.9 °C) Intake/Output:  
Last shift:      No intake/output data recorded. Last 3 shifts:  1901 - 10/02 0700 In: 1491.2 [I.V.:1491.2] Out: 400 [Urine:400] Intake/Output Summary (Last 24 hours) at 10/2/2020 1105 Last data filed at 10/2/2020 7496 Gross per 24 hour Intake 1291.22 ml Output  Net 1291.22 ml Last 3 Recorded Weights in this Encounter  
 20 1401 20 2236 10/02/20 0245 Weight: 78.5 kg (173 lb) 72.9 kg (160 lb 11.5 oz) 78.5 kg (173 lb) Recent Labs 10/01/20 
0305 PHI 7.38  
PCO2I 43.4 PO2I 62* HCO3I 25.8 FIO2I 0.40 Physical Exam:  
 
General/Neurology: Alert, awake. Confused. NAD. No respiratory distress. Head:   NCAT. Eye:   EOM intact, no icterus/pallor/cyanosis. Nose:   No nasal drainage/discharge. Throat:  Moist mucosa. No oral thrush. Neck:   Trachea midline. No palpable cervical lymphadenopathy. Lung: Moderate air entry bilateral equal.  Scattered rales bilaterally. No wheezing or stridors. No prolonged expiration or accessory muscle use. Heart:   S1 S2 present. No murmur or JVD. Abdomen:  Soft. NT. ND. No palpable masses. Extremities:  No edema, cyanosis or clubbing. Pulses: 2+ and symmetric in DP. Lymphatic:  No cervical or supraclavicular palpable lymphadenopathy. Data:  
   
Recent Results (from the past 24 hour(s)) GLUCOSE, POC Collection Time: 10/01/20 12:31 PM  
Result Value Ref Range Glucose (POC) 307 (H) 70 - 110 mg/dL GLUCOSE, POC Collection Time: 10/01/20 11:05 PM  
Result Value Ref Range Glucose (POC) 149 (H) 70 - 110 mg/dL METABOLIC PANEL, COMPREHENSIVE Collection Time: 10/02/20  4:35 AM  
Result Value Ref Range Sodium 145 136 - 145 mmol/L  Potassium 3.3 (L) 3.5 - 5.5 mmol/L  
 Chloride 108 100 - 111 mmol/L  
 CO2 29 21 - 32 mmol/L Anion gap 8 3.0 - 18 mmol/L Glucose 160 (H) 74 - 99 mg/dL BUN 41 (H) 7.0 - 18 MG/DL Creatinine 0.91 0.6 - 1.3 MG/DL  
 BUN/Creatinine ratio 45 (H) 12 - 20 GFR est AA >60 >60 ml/min/1.73m2 GFR est non-AA >60 >60 ml/min/1.73m2 Calcium 9.4 8.5 - 10.1 MG/DL Bilirubin, total 0.8 0.2 - 1.0 MG/DL  
 ALT (SGPT) 19 16 - 61 U/L  
 AST (SGOT) 44 (H) 10 - 38 U/L Alk. phosphatase 60 45 - 117 U/L Protein, total 7.7 6.4 - 8.2 g/dL Albumin 5.0 3.4 - 5.0 g/dL Globulin 2.7 2.0 - 4.0 g/dL A-G Ratio 1.9 (H) 0.8 - 1.7    
CBC W/O DIFF Collection Time: 10/02/20  4:35 AM  
Result Value Ref Range WBC 14.1 (H) 4.6 - 13.2 K/uL  
 RBC 3.58 (L) 4.70 - 5.50 M/uL  
 HGB 10.8 (L) 13.0 - 16.0 g/dL HCT 34.3 (L) 36.0 - 48.0 % MCV 95.8 74.0 - 97.0 FL  
 MCH 30.2 24.0 - 34.0 PG  
 MCHC 31.5 31.0 - 37.0 g/dL  
 RDW 14.5 11.6 - 14.5 % PLATELET 840 484 - 754 K/uL MPV 9.6 9.2 - 11.8 FL  
GLUCOSE, POC Collection Time: 10/02/20  5:10 AM  
Result Value Ref Range Glucose (POC) 169 (H) 70 - 110 mg/dL Chemistry Recent Labs 10/02/20 
6056 10/01/20 
8346 09/30/20 
3091 * 189* 163*  146* 143  
K 3.3* 3.9 4.0  
 108 105 CO2 29 29 30 BUN 41* 36* 21* CREA 0.91 0.98 0.83 CA 9.4 8.9 8.8 AGAP 8 9 8 BUCR 45* 37* 25* AP 60 71 85  
TP 7.7 7.9 7.6 ALB 5.0 4.9 4.2 GLOB 2.7 3.0 3.4 AGRAT 1.9* 1.6 1.2 Lactic Acid Lactic acid Date Value Ref Range Status 09/29/2020 0.8 0.4 - 2.0 MMOL/L Final  
 
No results for input(s): LAC in the last 72 hours. Liver Enzymes Protein, total  
Date Value Ref Range Status 10/02/2020 7.7 6.4 - 8.2 g/dL Final  
 
Albumin Date Value Ref Range Status 10/02/2020 5.0 3.4 - 5.0 g/dL Final  
 
Globulin Date Value Ref Range Status 10/02/2020 2.7 2.0 - 4.0 g/dL Final  
 
A-G Ratio Date Value Ref Range Status 10/02/2020 1.9 (H) 0.8 - 1.7   Final  
 
 Alk. phosphatase Date Value Ref Range Status 10/02/2020 60 45 - 117 U/L Final  
 
Recent Labs 10/02/20 
1448 10/01/20 
3423 09/30/20 
0109 TP 7.7 7.9 7.6 ALB 5.0 4.9 4.2 GLOB 2.7 3.0 3.4 AGRAT 1.9* 1.6 1.2 AP 60 71 85 CBC w/Diff Recent Labs 10/02/20 
7677 10/01/20 
4327 09/30/20 
5688 WBC 14.1* 18.1* 16.1*  
RBC 3.58* 3.89* 4.09* HGB 10.8* 11.9* 12.3*  
HCT 34.3* 37.3 38.9  316 356 Cardiac Enzymes No results found for: CPK, CK, CKMMB, CKMB, RCK3, CKMBT, CKNDX, CKND1, SAMMY, TROPT, TROIQ, KATJA, TROPT, TNIPOC, BNP, BNPP  
 
BNP No results found for: BNP, BNPP, XBNPT Coagulation No results for input(s): PTP, INR, APTT, INREXT, INREXT in the last 72 hours. Thyroid  No results found for: T4, T3U, TSH, TSHEXT, TSHEXT No results found for: T4  
 
Urinalysis Lab Results Component Value Date/Time Color YELLOW 09/29/2020 08:56 AM  
 Appearance CLEAR 09/29/2020 08:56 AM  
 Specific gravity 1.009 09/29/2020 08:56 AM  
 pH (UA) 7.0 09/29/2020 08:56 AM  
 Protein Negative 09/29/2020 08:56 AM  
 Glucose Negative 09/29/2020 08:56 AM  
 Ketone Negative 09/29/2020 08:56 AM  
 Bilirubin Negative 09/29/2020 08:56 AM  
 Urobilinogen 0.2 09/29/2020 08:56 AM  
 Nitrites Negative 09/29/2020 08:56 AM  
 Leukocyte Esterase Negative 09/29/2020 08:56 AM  
 Epithelial cells 0 09/29/2020 08:56 AM  
 Bacteria 0 (A) 09/29/2020 08:56 AM  
 WBC 0 to 3 09/29/2020 08:56 AM  
 RBC 0 to 3 09/29/2020 08:56 AM  
  
 
Micro  No results for input(s): SDES, CULT in the last 72 hours. No results for input(s): CULT in the last 72 hours. Culture data during this hospitalization. All Micro Results Procedure Component Value Units Date/Time Essie Patton [[id number]] Collected:  09/29/20 0856 Order Status:  Completed Specimen:  Urine from Clean catch Updated:  09/30/20 1931 Special Requests: NO SPECIAL REQUESTS Culture result: No growth (<1,000 CFU/ML) Echo 9/29/20: 
Result status: Final result · Contrast used: DEFINITY. · Left Ventricle: Normal cavity size, wall thickness and systolic function (ejection fraction normal). The estimated EF is 55 - 60%. There is mild (grade 1) left ventricular diastolic dysfunction E/E' ratio = 14.08. 
· Tricuspid Valve: Tricuspid valve not well visualized. No stenosis. Tricuspid regurgitation is inadequate for estimation of right ventricular systolic pressure. CTA chest 9/28/2020: 
1. No evidence of pulmonary embolism. 2.  Bilateral bronchial thickening possibly related to bronchitis. 3. There is also bilateral lower lung field interstitial coarsening and faint 
lung opacities which may be related to hypoaeration. Edema or developing 
infiltrates considered less likely. 
  
CT head 9/28/2020: No acute intracranial abnormality. 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 
greater than right. Atrophy with chronic ischemic changes. Images report reviewed by me: 
CT (Most Recent) (CT chest reviewed by me) Results from [facility] Encounter encounter on 09/28/20 CT HEAD WO CONT Narrative EXAM: CT head INDICATION: Headache. COMPARISON: None. TECHNIQUE: Axial CT imaging of the head was performed without intravenous 
contrast. Standard multiplanar coronal and sagittal reformatted images were 
obtained and are included in interpretation. One or more dose reduction techniques were used on this CT: automated exposure 
control, adjustment of the mAs and/or kVp according to patient size, and 
iterative reconstruction techniques. The specific techniques used on this CT 
exam have been documented in the patient's electronic medical record.   Digital 
Imaging and Communications in Medicine (DICOM) format image data are available 
to nonaffiliated external healthcare facilities or entities on a secure, media 
free, reciprocally searchable basis with patient authorization for at least a 
12-month period after this study. _______________ FINDINGS: 
 
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 
bilateral frontal lobes, left greater than right. Associated ex vacuo dilatation 
of bilateral lateral ventricle frontal horns. Cerebral atrophy. Patchy 
periventricular, deep and subcortical white matter hypoattenuation which is 
nonspecific but likely represents chronic ischemic changes. No evidence of acute 
large vessel transcortical infarct or acute parenchymal hemorrhage. No midline 
shift or hydrocephalus. EXTRA-AXIAL SPACES AND MENINGES: There are no abnormal extra-axial fluid 
collections. CALVARIUM: Intact. SINUSES: Mild left frontal sinus opacification. OTHER: None. 
 
_______________ Impression IMPRESSION: 
 
No acute intracranial abnormality. 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 
greater than right. Atrophy with chronic ischemic changes. CXR reviewed by me: 
XR (Most Recent). CXR  reviewed by me and compared with previous CXR Results from [facility] Encounter encounter on 09/28/20 XR CHEST PORT Narrative --------------------------------------------------------------------------- 
<<<<<<<<<           [facility]           >>>>>>>>>  
--------------------------------------------------------------------------- CLINICAL HISTORY:  Shortness of breath. COMPARISON EXAMINATIONS:  September 28, 2020. 
 
 
---  SINGLE FRONTAL VIEW OF THE CHEST  --- The cardiac mediastinal silhouette is stable. There is diffuse bilateral 
increased initial markings and diffuse bilateral infiltrates. No significant 
osseous abnormalities are identified.   
 
 
-------------- Impression Impression: 
-------------- Diffuse bilateral increased markings and diffuse bilateral infiltrates. Findings 
could represent interstitial and pulmonary edema.  Atypical pneumonia could also 
have this appearance. There has been significant interval progression compared 
to previous. Wai Barros MD 
10/2/2020

## 2020-10-02 NOTE — PROGRESS NOTES
Problem: Pressure Injury - Risk of 
Goal: *Prevention of pressure injury Description: Document Alfredo Scale and appropriate interventions in the flowsheet. Outcome: Progressing Towards Goal 
Note: Pressure Injury Interventions: 
Sensory Interventions: Assess changes in LOC, Keep linens dry and wrinkle-free, Minimize linen layers, Pressure redistribution bed/mattress (bed type) Moisture Interventions: Absorbent underpads, Limit adult briefs, Maintain skin hydration (lotion/cream), Minimize layers Activity Interventions: Pressure redistribution bed/mattress(bed type), PT/OT evaluation Mobility Interventions: Pressure redistribution bed/mattress (bed type), PT/OT evaluation Nutrition Interventions: Document food/fluid/supplement intake Friction and Shear Interventions: Apply protective barrier, creams and emollients, Minimize layers Problem: Patient Education: Go to Patient Education Activity Goal: Patient/Family Education Outcome: Progressing Towards Goal 
  
Problem: Falls - Risk of 
Goal: *Absence of Falls Description: Document Rochele Malone Fall Risk and appropriate interventions in the flowsheet. Outcome: Progressing Towards Goal 
Note: Fall Risk Interventions: 
Mobility Interventions: OT consult for ADLs, Patient to call before getting OOB, PT Consult for mobility concerns, PT Consult for assist device competence Mentation Interventions: Door open when patient unattended, Adequate sleep, hydration, pain control Medication Interventions: Patient to call before getting OOB, Teach patient to arise slowly Elimination Interventions: Call light in reach, Patient to call for help with toileting needs Problem: Patient Education: Go to Patient Education Activity Goal: Patient/Family Education Outcome: Progressing Towards Goal

## 2020-10-02 NOTE — PROGRESS NOTES
PATIENT NOTED TO BE IN MODERATE RESPIRATORY DISTRESS. RR 24 BUT IRREGULAR/SHORT PERIODS OF APNEA/SLIGHT UPPER AIRWAY OBSTRUCTION. HFNC RESUMED @ 0745 WITH WITH SETTINGS:  30L/40%. Daisy Hawkins RN, NOTIFIED. NEB TX WAS GIVEN EARLIER WITH NO IMPROVEMENT NOTED.

## 2020-10-02 NOTE — PROGRESS NOTES
Problem: Mobility Impaired (Adult and Pediatric) Goal: *Acute Goals and Plan of Care (Insert Text) Description: PT goals to be met in 1-7 days: Pt will be able to perform supine<>sit S for transfers at home. Pt will be able to perform sit<>stand S for increased ability to transfer at home safely. Pt will be able to participate in gt training >100' w/ RW, O2 support, GB and SBA for improved ability in home upon d/c. Pt will be able to perform stair training >2 steps, B/U rail and CGA to obtain safe entry into home upon d/c. Pt will be educated regarding HEP per MD protocol for optimal AROM/strength outcomes. Outcome: Not Progressing Towards Goal 
 PHYSICAL THERAPY TREATMENT Patient: Juani William (28 y.o. male) Date: 10/2/2020 Diagnosis: COPD exacerbation ([facility] [address] [J44.1] Acute respiratory distress [R06.03] Chronic obstructive pulmonary disease with acute exacerbation ([facility] [address] Precautions: Fall, Aspiration, DNR, DNI, Seizure PLOF:  
 
ASSESSMENT: 
Pt call light on upon entering room. Pt naked, high flow nasal cannula no on. Condom cath also not on and brief wet with urine. Donned nasal cannula, clean gown and brief. Pt stood up EOB for brief change, Abdulaziz to stand. Returned to sitting EOB, bed alarm on. Transport arrived to take pt to xray. Updated CNA. Progression toward goals:  
[]      Improving appropriately and progressing toward goals 
[]      Improving slowly and progressing toward goals [x]      Not making progress toward goals PLAN: 
Patient continues to benefit from skilled intervention to address the above impairments. Continue treatment per established plan of care. Discharge Recommendations:  [address] [facility] Further Equipment Recommendations for Discharge:  N/A  
 
SUBJECTIVE:  
Patient stated I want my feet down.  OBJECTIVE DATA SUMMARY:  
Critical Behavior: 
Neurologic State: Lethargic, Confused Orientation Level: Oriented to person, Oriented to place, Disoriented to time, Disoriented to situation Cognition: Decreased command following, Decreased attention/concentration Safety/Judgement: Awareness of environment Functional Mobility Training: 
Transfers: 
Sit to Stand: Minimum assistance Stand to Sit: Contact guard assistance Balance: 
Sitting: Intact Pain: 
Pain level pre-treatment: 0/10 Pain level post-treatment: 0/10 Pain Intervention(s): Medication (see MAR); Rest, Ice, Repositioning Response to intervention: Nurse notified, See doc flow Activity Tolerance:  
Fair- 
Please refer to the flowsheet for vital signs taken during this treatment. After treatment:  
[] Patient left in no apparent distress sitting up in chair 
[x] Patient left in no apparent distress in bed 
[x] Call bell left within reach 
[] Nursing notified 
[] Caregiver present [x] Bed alarm activated 
[] SCDs applied COMMUNICATION/EDUCATION:  
[x]         Role of Physical Therapy in the acute care setting. []         Fall prevention education was provided and the patient/caregiver indicated understanding. []         Patient/family have participated as able in working toward goals and plan of care. []         Patient/family agree to work toward stated goals and plan of care. []         Patient understands intent and goals of therapy, but is neutral about his/her participation. []         Patient is unable to participate in stated goals/plan of care: ongoing with therapy staff. 
[]         Other: 
 
   
Maren Rowley PTA Time Calculation: 14 mins

## 2020-10-02 NOTE — PROGRESS NOTES
MBS WAS CANCELLED AS NOTED BY SLP. PATIENT WAS TRANSITIONED FROM HFNC TO 2L NC BY RN.  VERY SLIGHT BREATHLESSNESS NOTED AT THIS TIME.  SPO2 95-97%. SCHEDULED DUONEB TX GIVEN. BS REMAIN DIMINISHED. HFNC LEFT ON STANDBY.

## 2020-10-02 NOTE — PROGRESS NOTES
Speech Therapy Note: Modified barium swallow study attempted but was unable to be completed due to inconsistent alertness. The patient was not safe for PO intake. D/w RN, Lico Recinos. Will re-attempt next business day or as medically indicated. Yadira Sargent M.S., CCC-SLP Speech Language Pathologist

## 2020-10-02 NOTE — ROUTINE PROCESS
Bedside and Verbal shift change report given to WILMER Bautista R.N. (oncoming nurse) by ALEXANDRA Alejandro R.N. (offgoing nurse). Report included the following information SBAR, Kardex, Intake/Output, MAR, Accordion, Recent Results and Med Rec Status.

## 2020-10-02 NOTE — PROGRESS NOTES
Occupational Therapy Treatment Attempt Chart reviewed. Attempted Occupational Therapy Treatment, however, patient unable to be seen due to: 
[]  Nausea/vomiting 
[]  Eating 
[]  Pain 
[]  Patient too lethargic [x]  Off Unit for testing/procedure 
[]  Dialysis treatment in progress 
[]  Telemetry Results []  Other:  
 
Will f/u later as patient's schedule allows.   
Thank you for this referral. 
Francisca Dey OTR/HATTIE

## 2020-10-02 NOTE — PROGRESS NOTES
DC Plan: TBD Chart reviewed, Pt admitted to tele by hospitalist.  Pt with noted agitation and confusion. Palliative consult noted. This CM called pts son Manuel Lilo  to discuss dc plan and introduce CM role. Pt was living alone in [place] recently and was not managing well, so son brought him to [place]. Pt now lives with son Shantanu Aguirrester and son's significant other (who is an LPN). Son voices concerns about pt urinating in the house. Discussed HH v personal care v SNF/LTC. Ultimately son would prefer to have pt home, depending on pts presentation at time of discharge. He would accept [facility] and his significant other is willing to assist with care, as she is not working. Did place local SNF/LTC referrals for continuity. Pt has neg covid test 9/29. Pt has RW, home oxygen (thru company in [place]). Pts son has made arrangements for pt to see a PCP ANGELO Hi @ [facility]. CM will cont to follow [phone]

## 2020-10-03 NOTE — PROGRESS NOTES
Hospitalist Progress Note Patient: Trista Braun MRN: [id number]  CSN: [id number] YOB: 1940  Age: 68 y.o. Sex: male DOA: 9/28/2020 LOS:  LOS: 4 days Assessment/Plan Patient Active Problem List  
Diagnosis Code  Tobacco dependence F17.200  Chronic obstructive pulmonary disease with acute exacerbation (HCC) J44.1  Supplemental oxygen dependent Z99.81  
 Suspected 2019 novel coronavirus infection Z20.828  
 Hypoxia R09.02  
 CAD (coronary artery disease) I25.10  Combative behavior R46.89  
 Alcohol abuse F10.10  Dementia ([facility] [address] F03.90  
 BRANDEN (obstructive sleep apnea) G47.33  
 Acute respiratory distress R06.03  
 Acute respiratory failure with hypoxia and hypercapnia (HCC) J96.01, J96.02  
 Altered mental status R41.82  
  
 
 
 
69 y/o male with COPD (2L supplemental oxygen dependent), BRANDEN, CAD, alcohol abuse, and tobacco dependence is admitted with a COPD exacerbation, hypoxia. He required to be started on high flow oxygen and ICU admission. He is now on oxygen by nasal cannula, answering to questions. COVID 19 rapid test negative. Acute on chronic respiratory failure with hypoxia- 
Secondary to COPD exacerbation, now improved, he is on oxygen by nasal cannula. CTA chest negative for PE, bronchial wall thickening and possible basilar atelectasis versus hypoinflation. COPD exacerbation- 
New with IV Solu-Medrol, duo nebs, Pulmicort. Continue with empiric antibiotics. Aspiration pneumonia - On zosyn Dysphagia- 
Seen by SLP, recommend n.p.o., for MBS. CAD- No chest pain, history of stent placement. Echo with EF of 55 to 80%, grade 1 diastolic dysfunction. Medications on hold secondary to dysphagia and n.p.o. Alcohol use- 
Continue with CIWA protocol and banana bag. Tobacco abuse- 
Nicotine patch Continue with Dilantin, change dose to IV. Elevated blood sugar secondary to steroid use, continue with sliding scale insulin. DVT prophylaxis with Lovenox, Poor prognosis, palliative care following. Disposition : TBD Review of systems General: No fevers or chills. Cardiovascular: No chest pain or pressure. No palpitations. Pulmonary: see above. Gastrointestinal: No nausea, vomiting. Physical Exam: 
General: Awake, cooperative, no acute distress   
HEENT: NC, Atraumatic. PERRLA, anicteric sclerae. Lungs: CTA Bilaterally. No Wheezing/Rhonchi/Rales. Heart:  S1 S2,  No murmur, No Rubs, No Gallops Abdomen: Soft, Non distended, Non tender.  +Bowel sounds, Extremities: No c/c/e Psych:   Not anxious or agitated. Neurologic:  No acute neurological deficit. Vital signs/Intake and Output: 
Visit Vitals BP (!) 140/79 Pulse 85 Temp 97.2 °F (36.2 °C) Resp 18 Ht 5' 8\" (1.727 m) Wt 70.2 kg (154 lb 11.2 oz) SpO2 98% BMI 23.52 kg/m² Current Shift:  No intake/output data recorded. Last three shifts:  No intake/output data recorded. Labs: Results:  
   
Chemistry Recent Labs 10/03/20 
9370 10/02/20 
1811 10/01/20 
1591 * 160* 189*  145 146*  
K 3.7 3.3* 3.9  108 108 CO2 30 29 29 BUN 36* 41* 36* CREA 0.82 0.91 0.98  
CA 9.2 9.4 8.9 AGAP 7 8 9 BUCR 44* 45* 37* AP  --  60 71 TP  --  7.7 7.9 ALB  --  5.0 4.9 GLOB  --  2.7 3.0 AGRAT  --  1.9* 1.6  
  
CBC w/Diff Recent Labs 10/03/20 
1909 10/02/20 
8076 10/01/20 
2744 WBC 13.5* 14.1* 18.1*  
RBC 4.19* 3.58* 3.89* HGB 13.0 10.8* 11.9*  
HCT 40.3 34.3* 37.3  266 316 GRANS 83*  --   --   
LYMPH 10*  --   --   
EOS 0  --   --   
  
Cardiac Enzymes No results for input(s): CPK, CKND1, SAMMY in the last 72 hours. No lab exists for component: Cherry Cheryl Coagulation No results for input(s): PTP, INR, APTT, INREXT, INREXT in the last 72 hours.    
Lipid Panel No results found for: CHOL, CHOLPOCT, CHOLX, [address], [place], [id number], HDL, HDLP, LDL, LDLC, DLDLP, 509752, VLDLC, VLDL, TGLX, TRIGL, TRIGP, TGLPOCT, CHHD, CHHDX  
BNP No results for input(s): BNPP in the last 72 hours. Liver Enzymes Recent Labs 10/02/20 
0435 TP 7.7 ALB 5.0 AP 60 Thyroid Studies No results found for: T4, T3U, TSH, TSHEXT, TSHEXT Procedures/imaging: see electronic medical records for all procedures/Xrays and details which were not copied into this note but were reviewed prior to creation of Plan

## 2020-10-03 NOTE — PROGRESS NOTES
Speech Pathology Note Acknowledged new order for MBS. Study to be completed next business day or as pt status permits. Thank you, 
Osborn Brittle, MS, CCC/SLP Speech- Language Pathologist

## 2020-10-03 NOTE — PROGRESS NOTES
[facility] Pulmonary, Critical Care, and Sleep Medicine Name: Murali Mercer MRN: [id number] : 1940 Hospital: [facility] [place] Date: 10/3/2020  Room: [address] [place] [address] Note Consult requesting physician: Dr. Michael Garland Reason for Consult: COPD exacerbation Subjective/History of Present Illness:  
 
Patient is a 68 y.o. male with PMHx significant for dementia, CAD, alcohol abuse, tobacco dependence/smoker, BRANDEN intolerant with CPAP, chronic hypoxic respiratory failure on home O2, COPD; admitted with combative behavior likely due to dementia, COPD exacerbation, acute hypoxic and hypercapnic respiratory failure. Patient used to live other state, not able to take care of him and so recently moved to live with his son in [place]. He had MRI in 2020, not a candidate for CABG, had 6 stent placed, per history. He still an active smoker and alcohol abuser. Hx BRANDEN but intolerant to CPAP and not using CPAP. He is chronic hypoxic respiratory failure on home O2, but an active smoker. Patient admitted to ICU in altered mental status/severe agitation along with acute hypoxic and hypercapnic respiratory failure with COPD exacerbation, on HF NC. Patient moved out of ICU 2020. 
 
10/3/2020: 
Chart reviewed; discussed with Dr. Sosa Ashraf during signout. Patient remains in telemetry unit. He appears confused. Stable breathing; no worsening shortness of breath. Patient oxygen requirements better; wean down to nasal cannula at 3 L. Limited review of symptoms due to patient condition. No Known Allergies Past Medical History:  
Diagnosis Date  CAD (coronary artery disease)  Chronic obstructive pulmonary disease (HCC)   
 on 2 liters at home  Dementia ([address]  BRANDEN (obstructive sleep apnea) Past Surgical History:  
Procedure Laterality Date  CARDIAC SURG PROCEDURE UNLIST    
 stents x6  
  
 Social History Tobacco Use  Smoking status: Current Every Day Smoker Packs/day: 2.00  Smokeless tobacco: Never Used Substance Use Topics  Alcohol use: Yes Alcohol/week: 7.0 standard drinks Types: 7 Cans of beer per week Family History Problem Relation Age of Onset  Heart Disease Father Prior to Admission medications Medication Sig Start Date End Date Taking? Authorizing Provider  
clopidogreL (Plavix) 75 mg tab Take 75 mg by mouth daily. Indications: stent placement   Yes Kiko, MD Evelin  
potassium chloride (KLOR-CON) 10 mEq tablet Take 10 mEq by mouth daily. Yes Other, MD Evelin  
tamsulosin (Flomax) 0.4 mg capsule Take 0.4 mg by mouth. Indications: enlarged prostate with urination problem   Yes Kiko, MD Evelin  
doxycycline (ADOXA) 100 mg tablet Take 100 mg by mouth two (2) times a day. Yes Other, MD Evelin  
phenytoin ER (Dilantin Extended) 100 mg ER capsule Take 100 mg by mouth two (2) times a day. Indications: epilepsy   Yes Kiko, MD Evelin  
furosemide (Lasix) 40 mg tablet Take 40 mg by mouth daily. Indications: visible water retention   Yes Kiko, MD Evelin  
 
Current Facility-Administered Medications Medication Dose Route Frequency  furosemide (LASIX) injection 20 mg  20 mg IntraVENous BID  
 0.45% sodium chloride infusion  50 mL/hr IntraVENous CONTINUOUS  
 insulin glargine (LANTUS) injection 10 Units  10 Units SubCUTAneous DAILY  methylPREDNISolone (PF) (Solu-MEDROL) injection 40 mg  40 mg IntraVENous Q8H  
 insulin lispro (HUMALOG) injection   SubCUTAneous Q6H  
 [Held by provider] QUEtiapine (SEROquel) tablet 25 mg  25 mg Oral QHS  albuterol-ipratropium (DUO-NEB) 2.5 MG-0.5 MG/3 ML  3 mL Nebulization QID RT  
 budesonide (PULMICORT) 500 mcg/2 ml nebulizer suspension  500 mcg Nebulization BID RT  
 phenytoin (DILANTIN) injection 100 mg  100 mg IntraVENous BID  piperacillin-tazobactam (ZOSYN) 3.375 g in 0.9% sodium chloride (MBP/ADV) 100 mL MBP  3.375 g IntraVENous Q6H  
 sodium chloride (NS) flush 5-40 mL  5-40 mL IntraVENous Q8H  
 enoxaparin (LOVENOX) injection 40 mg  40 mg SubCUTAneous DAILY  [Held by provider] clopidogreL (PLAVIX) tablet 75 mg  75 mg Oral DAILY  [Held by provider] tamsulosin (FLOMAX) capsule 0.4 mg  0.4 mg Oral DAILY  nicotine (NICODERM CQ) 21 mg/24 hr patch 1 Patch  1 Patch TransDERmal DAILY  [Held by provider] atorvastatin (LIPITOR) tablet 40 mg  40 mg Oral DAILY  0.9% sodium chloride 1,000 mL with mvi, adult no. 4 with vit K 10 mL, thiamine 679 mg, folic acid 1 mg infusion   IntraVENous DAILY Objective:  
Vital Signs:   
Visit Vitals /69 (BP 1 Location: Left arm, BP Patient Position: At rest) Pulse 85 Temp 97.2 °F (36.2 °C) Resp 18 Ht 5' 8\" (1.727 m) Wt 70.2 kg (154 lb 11.2 oz) SpO2 98% BMI 23.52 kg/m² O2 Device: Nasal cannula O2 Flow Rate (L/min): 3 l/min Temp (24hrs), Av.5 °F (36.4 °C), Min:97.2 °F (36.2 °C), Max:97.7 °F (36.5 °C) Intake/Output:  
Last shift:      No intake/output data recorded. Last 3 shifts: 10/01 1901 - 10/03 0700 In: 1291.2 [I.V.:1291.2] Out: - Intake/Output Summary (Last 24 hours) at 10/3/2020 1609 Last data filed at 10/3/2020 0330 Gross per 24 hour Intake 0 ml Output  Net 0 ml Last 3 Recorded Weights in this Encounter 20 2236 10/02/20 0245 10/03/20 0277 Weight: 72.9 kg (160 lb 11.5 oz) 78.5 kg (173 lb) 70.2 kg (154 lb 11.2 oz) Physical Exam:  
Comfortable; on 3-4 lits nc; acyanotic; appears old and frail HEENT: pupils not dilated, no scleral jaundice, moist oral mucosa Neck: No adenopathy or thyroid swelling CVS: S1S2 no murmurs; JVD not elevated RS: Mod air entry bilaterally, decreased BS at bases, no wheezes; bilateral basal crackles Abd: soft, non tender, no hepatosplenomegaly, no abd distension Neuro: Confused; mumbling; moving extremities Extrm: no leg edema or swelling or clubbing Skin: no rash Lymphatic: no cervical or supraclavicular adenopathy 
 
 
Data:  
   
Recent Results (from the past 12 hour(s)) METABOLIC PANEL, BASIC Collection Time: 10/03/20  5:53 AM  
Result Value Ref Range Sodium 144 136 - 145 mmol/L Potassium 3.7 3.5 - 5.5 mmol/L Chloride 107 100 - 111 mmol/L  
 CO2 30 21 - 32 mmol/L Anion gap 7 3.0 - 18 mmol/L Glucose 119 (H) 74 - 99 mg/dL BUN 36 (H) 7.0 - 18 MG/DL Creatinine 0.82 0.6 - 1.3 MG/DL  
 BUN/Creatinine ratio 44 (H) 12 - 20 GFR est AA >60 >60 ml/min/1.73m2 GFR est non-AA >60 >60 ml/min/1.73m2 Calcium 9.2 8.5 - 10.1 MG/DL  
CBC WITH AUTOMATED DIFF Collection Time: 10/03/20  5:53 AM  
Result Value Ref Range WBC 13.5 (H) 4.6 - 13.2 K/uL  
 RBC 4.19 (L) 4.70 - 5.50 M/uL  
 HGB 13.0 13.0 - 16.0 g/dL HCT 40.3 36.0 - 48.0 % MCV 96.2 74.0 - 97.0 FL  
 MCH 31.0 24.0 - 34.0 PG  
 MCHC 32.3 31.0 - 37.0 g/dL  
 RDW 14.4 11.6 - 14.5 % PLATELET 529 253 - 453 K/uL MPV 10.4 9.2 - 11.8 FL  
 NEUTROPHILS 83 (H) 40 - 73 % LYMPHOCYTES 10 (L) 21 - 52 % MONOCYTES 7 3 - 10 % EOSINOPHILS 0 0 - 5 % BASOPHILS 0 0 - 2 %  
 ABS. NEUTROPHILS 11.2 (H) 1.8 - 8.0 K/UL  
 ABS. LYMPHOCYTES 1.4 0.9 - 3.6 K/UL  
 ABS. MONOCYTES 0.9 0.05 - 1.2 K/UL  
 ABS. EOSINOPHILS 0.0 0.0 - 0.4 K/UL  
 ABS. BASOPHILS 0.0 0.0 - 0.1 K/UL  
 DF AUTOMATED    
GLUCOSE, POC Collection Time: 10/03/20  6:02 AM  
Result Value Ref Range Glucose (POC) 125 (H) 70 - 110 mg/dL GLUCOSE, POC Collection Time: 10/03/20 11:05 AM  
Result Value Ref Range Glucose (POC) 153 (H) 70 - 110 mg/dL Lactic Acid Lactic acid Date Value Ref Range Status 09/29/2020 0.8 0.4 - 2.0 MMOL/L Final  
 
No results for input(s): LAC in the last 72 hours. Culture data during this hospitalization. All Micro Results Procedure Component Value Units Date/Time Anna Marie Fernández [[id number]] Collected:  09/29/20 0856 Order Status:  Completed Specimen:  Urine from Clean catch Updated:  09/30/20 1931 Special Requests: NO SPECIAL REQUESTS Culture result: No growth (<1,000 CFU/ML) Echo 9/29/20: 
Result status: Final result · Contrast used: DEFINITY. · Left Ventricle: Normal cavity size, wall thickness and systolic function (ejection fraction normal). The estimated EF is 55 - 60%. There is mild (grade 1) left ventricular diastolic dysfunction E/E' ratio = 14.08. 
· Tricuspid Valve: Tricuspid valve not well visualized. No stenosis. Tricuspid regurgitation is inadequate for estimation of right ventricular systolic pressure. CTA chest 9/28/2020: 
1. No evidence of pulmonary embolism. 2.  Bilateral bronchial thickening possibly related to bronchitis. 3. There is also bilateral lower lung field interstitial coarsening and faint 
lung opacities which may be related to hypoaeration. Edema or developing 
infiltrates considered less likely. 
  
 
Images report reviewed by me: 
CT 9/30 [place] [facility] [place]20 CT HEAD WO CONT Narrative EXAM: CT head INDICATION: Headache. COMPARISON: None. TECHNIQUE: Axial CT imaging of the head was performed without intravenous 
contrast. Standard multiplanar coronal and sagittal reformatted images were 
obtained and are included in interpretation. One or more dose reduction techniques were used on this CT: automated exposure 
control, adjustment of the mAs and/or kVp according to patient size, and 
iterative reconstruction techniques. The specific techniques used on this CT 
exam have been documented in the patient's electronic medical record. Digital 
Imaging and Communications in Medicine (DICOM) format image data are available 
to nonaffiliated external healthcare facilities or entities on a secure, media free, reciprocally searchable basis with patient authorization for at least a 
12-month period after this study. _______________ FINDINGS: 
 
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 
bilateral frontal lobes, left greater than right. Associated ex vacuo dilatation 
of bilateral lateral ventricle frontal horns. Cerebral atrophy. Patchy 
periventricular, deep and subcortical white matter hypoattenuation which is 
nonspecific but likely represents chronic ischemic changes. No evidence of acute 
large vessel transcortical infarct or acute parenchymal hemorrhage. No midline 
shift or hydrocephalus. EXTRA-AXIAL SPACES AND MENINGES: There are no abnormal extra-axial fluid 
collections. CALVARIUM: Intact. SINUSES: Mild left frontal sinus opacification. OTHER: None. 
 
_______________ Impression IMPRESSION: 
 
No acute intracranial abnormality. 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 
greater than right. Atrophy with chronic ischemic changes. CXR reviewed by me: 
XR 10/1 Results from [facility] Encounter encounter on 09/28/20 XR CHEST PORT Narrative --------------------------------------------------------------------------- 
<<<<<<<<<           [facility]  [facility]           >>>>>>>>>  
--------------------------------------------------------------------------- CLINICAL HISTORY:  Shortness of breath. COMPARISON EXAMINATIONS:  September 28, 2020. 
 
 
---  SINGLE FRONTAL VIEW OF THE CHEST  --- The cardiac mediastinal silhouette is stable. There is diffuse bilateral 
increased initial markings and diffuse bilateral infiltrates. No significant 
osseous abnormalities are identified.   
 
 
-------------- Impression Impression: 
-------------- Diffuse bilateral increased markings and diffuse bilateral infiltrates. Findings could represent interstitial and pulmonary edema. Atypical pneumonia could also 
have this appearance. There has been significant interval progression compared 
to previous. IMPRESSION:  
Acute respiratory failure with hypoxia and hypercapnia (HCC) J96.01, J96.02 Chronic obstructive pulmonary disease with acute exacerbation (HCC) J44.1 Altered mental status R41.82 Dementia ([facility] F03.90 · · Patient Active Problem List  
Diagnosis Code  Tobacco dependence F17.200  Chronic obstructive pulmonary disease with acute exacerbation (HCC) J44.1  Supplemental oxygen dependent Z99.81  
 Suspected 2019 novel coronavirus infection Z20.828  
 Hypoxia R09.02  
 CAD (coronary artery disease) I25.10  Combative behavior R46.89  
 Alcohol abuse F10.10  Dementia ([facility] [address] [facility] F03.90  
 BRANDEN (obstructive sleep apnea) G47.33  
 Acute respiratory distress R06.03  
 Acute respiratory failure with hypoxia and hypercapnia (HCC) J96.01, J96.02  
 Altered mental status R41.82  
 
 
 
· Code status: DNR/DNI  
  
RECOMMENDATIONS:  
Stable respirations; improved nasal cannula oxygen supportdown to 3 L; plan for repeating chest x-ray tomorrow. Hx COPD, chronic hypoxic respiratory failure on home O2, active smoker, BRANDEN intolerant to CPAP. CTA chest on admission negative for PEs; chest x-ray shows worsened left-sided pneumonialikely aspiration pneumonia. Patient could not tolerate BiPAP on admission due to agitation/dementia; he is currently DNR/DNI status. Continue DuoNeb 4 times a day. Budesonide nebulizer twice daily. Keep oxygen greater than 91%. Patient is a poor candidate for incentive spirometry due to dementia. Wean IV Solu-Medrol 40 mg every 12. AntibioticsZosyncomplete 7 days. Hx MI in 03/2020, not a candidate for CABG, 6 stent placed. Echo 9/29/2020: LVEF 55 to 60%. Grade 1 DD. Cardiac medications currently on hold since patient n.p.o.  
 Rapid COVID19 negative; COVID-19 PCR also negative. Nicotine patch. Patient currently n.p.o.; SLP on case. Aspiration precautions. Lasix 20 mg twice daily. DVT/GI prophylaxis Lovenox and pantoprazole. IV fluids nutritional bag with thiamine and folic acid and normal saline. Overall poor prognosis. DNR/DNI status. Palliative care team on case. 
____________________________________________________________________________ Will defer respective systems problem management to primary and other respective consultant and follow patient from pulmonary perspective. Further recommendations will be based on the patient's response to recommended treatment and results of the investigation ordered. High complexity decision making was performed during the evaluation of this patient Mich Church MD 
10/3/2020

## 2020-10-03 NOTE — PROGRESS NOTES
0715: Assumed patient care from off going [address], [address] This nurse moved patient closer to nurse station due to patient continuous attempts to exit the bed this AM. 0830: Patient assessment completed at this time. Bed locked in lowest position bed alarm on and call light in reach of patient. 0930: CIWAW reassessment rating 1, patient resting. Bed alarm in place

## 2020-10-03 NOTE — PROGRESS NOTES
Bedside shift change report given to Richard GUILLEN (oncoming nurse) by Cher Landau (offgoing nurse). Report included the following information SBAR, Intake/Output and Recent Results.

## 2020-10-03 NOTE — PROGRESS NOTES
1407: Pt sleeping but easily aroused. Falling back asleep mid sentence. Will follow up as schedule permits. 1440: Pt sleeping, did not arouse.

## 2020-10-03 NOTE — PROGRESS NOTES
1925 Assumed patient care at this time. Report received from Asia Lee RN.  
0775 Shift assessment completed and documented in flow sheets at this time. 2200 PRN Benadryl administered for itching. Patient scratching arms. 0023 PRN Haldol administered for agitation. 0715 Bedside and verbal shift change report given to Asia Lee RN (oncoming nurse) by Oliver Soliman RN (offgoing nurse). Report included the following information: SBAR, Kardex, MAR, and recent results.

## 2020-10-03 NOTE — PROGRESS NOTES
Problem: Pressure Injury - Risk of 
Goal: *Prevention of pressure injury Description: Document Alfredo Scale and appropriate interventions in the flowsheet. Outcome: Progressing Towards Goal 
Note: Pressure Injury Interventions: 
Sensory Interventions: Assess changes in LOC Moisture Interventions: Absorbent underpads, Check for incontinence Q2 hours and as needed Activity Interventions: Pressure redistribution bed/mattress(bed type) Mobility Interventions: Pressure redistribution bed/mattress (bed type) Nutrition Interventions: Document food/fluid/supplement intake Friction and Shear Interventions: HOB 30 degrees or less

## 2020-10-04 PROBLEM — R13.10 DYSPHAGIA: Status: ACTIVE | Noted: 2020-01-01

## 2020-10-04 NOTE — PROGRESS NOTES
Problem: Pressure Injury - Risk of 
Goal: *Prevention of pressure injury Description: Document Alfredo Scale and appropriate interventions in the flowsheet. Outcome: Progressing Towards Goal 
Note: Pressure Injury Interventions: 
Sensory Interventions: Assess changes in LOC Moisture Interventions: Absorbent underpads, Check for incontinence Q2 hours and as needed Activity Interventions: PT/OT evaluation, Pressure redistribution bed/mattress(bed type) Mobility Interventions: HOB 30 degrees or less, Pressure redistribution bed/mattress (bed type), PT/OT evaluation Nutrition Interventions: Document food/fluid/supplement intake Friction and Shear Interventions: Feet elevated on foot rest, HOB 30 degrees or less

## 2020-10-04 NOTE — PROGRESS NOTES
Hospitalist Progress Note Patient: Henny Cummings MRN: [id number]  CSN: [id number] YOB: 1940  Age: 68 y.o. Sex: male DOA: 9/28/2020 LOS:  LOS: 5 days Assessment/Plan Patient Active Problem List  
Diagnosis Code  Tobacco dependence F17.200  Chronic obstructive pulmonary disease with acute exacerbation (HCC) J44.1  Supplemental oxygen dependent Z99.81  
 Hypoxia R09.02  
 CAD (coronary artery disease) I25.10  Combative behavior R46.89  
 Alcohol abuse F10.10  Dementia ([address] F03.90  
 BRANDEN (obstructive sleep apnea) G47.33  
 Acute respiratory distress R06.03  
 Acute respiratory failure with hypoxia and hypercapnia (HCC) J96.01, J96.02  
 Altered mental status R41.82  Dysphagia R13.10  
  
 
 
 
67 y/o male with COPD (2L supplemental oxygen dependent), BRANDEN, CAD, alcohol abuse, and tobacco dependence is admitted with a COPD exacerbation, hypoxia. He required to be started on high flow oxygen and ICU admission. He is now on oxygen by nasal cannula, answering to questions. COVID 19 rapid test negative. Leukocytosis secondary to solumedrol, Sodium elevated, started on 1/2 NS, echo with EF of 55-60%. Still NPO for significant dysphagia and aspiration risk. For MBS tomorrow. Called and spoke with son on phone at length, discussed his hospitalization, especially dysphagia, he is choking on food prompting NPO. Will try MBS tomorrow. Discussed options of PEG tube vs continue oral intake with risk of aspiration vs hospice. He will talk to other family members and make decision. Acute on chronic respiratory failure with hypoxia- 
Secondary to COPD exacerbation, now improved, he is on oxygen by nasal cannula. CTA chest negative for PE, bronchial wall thickening and possible basilar atelectasis versus hypoinflation. COPD exacerbation- 
continue with IV Solu-Medrol, duo nebs, Pulmicort. Continue with empiric antibiotics. Aspiration pneumonia - Antibiotics, including anaerobic coverage. On zosyn Oxygen support. Head of the bed elevated to 30 degrees. Strict aspiration precautions Swallow evaluation. If needed follow up CXR. Dysphagia- 
Seen by SLP, recommend n.p.o., for MBS. CAD- No chest pain, history of stent placement. Echo with EF of 55 to 06%, grade 1 diastolic dysfunction. Medications on hold secondary to dysphagia and n.p.o. Alcohol use- 
Continue with CIWA protocol and banana bag. Tobacco abuse- 
Nicotine patch Hypernatremia - 
Started on 1/2 NS, follow sodium levels. Continue with Dilantin, change dose to IV. Elevated blood sugar secondary to steroid use, continue with sliding scale insulin. DVT prophylaxis with Lovenox, Poor prognosis, palliative care following. Disposition : TBD Review of systems General: No fevers or chills. Cardiovascular: No chest pain or pressure. No palpitations. Pulmonary: see above. Gastrointestinal: No nausea, vomiting. Physical Exam: 
General: Awake, cooperative, no acute distress   
HEENT: NC, Atraumatic. PERRLA, anicteric sclerae. Lungs: CTA Bilaterally. No Wheezing/Rhonchi/Rales. Heart:  S1 S2,  No murmur, No Rubs, No Gallops Abdomen: Soft, Non distended, Non tender.  +Bowel sounds, Extremities: No c/c/e Psych:   Not anxious or agitated. Neurologic:  No acute neurological deficit. Vital signs/Intake and Output: 
Visit Vitals BP (!) 147/76 Pulse 93 Temp 97.5 °F (36.4 °C) Resp 18 Ht 5' 8\" (1.727 m) Wt 71.3 kg (157 lb 3.2 oz) SpO2 96% BMI 23.90 kg/m² Current Shift:  No intake/output data recorded. Last three shifts:  No intake/output data recorded. Labs: Results:  
   
Chemistry Recent Labs 10/04/20 
1951 10/03/20 
5554 10/02/20 
5594 GLU 94 119* 160* * 144 145  
K 3.8 3.7 3.3*  
 107 108 CO2 29 30 29 BUN 35* 36* 41* CREA 0.83 0.82 0.91  
CA 9.1 9.2 9.4 AGAP 9 7 8 BUCR 42* 44* 45* AP  --   --  60  
TP  --   --  7.7 ALB  --   --  5.0  
GLOB  --   --  2.7 AGRAT  --   --  1.9*  
  
CBC w/Diff Recent Labs 10/04/20 
7488 10/03/20 
5302 10/02/20 
2264 WBC 15.7* 13.5* 14.1*  
RBC 4.54* 4.19* 3.58* HGB 13.7 13.0 10.8* HCT 43.9 40.3 34.3*  
 273 266 GRANS 82* 83*  --   
LYMPH 10* 10*  --   
EOS 0 0  --   
  
Cardiac Enzymes No results for input(s): CPK, CKND1, SAMMY in the last 72 hours. No lab exists for component: Roly Granda Coagulation No results for input(s): PTP, INR, APTT, INREXT, INREXT in the last 72 hours. Lipid Panel No results found for: CHOL, CHOLPOCT, CHOLX, CHLST, CHOLV, 615513, HDL, HDLP, LDL, LDLC, DLDLP, 105507, VLDLC, VLDL, TGLX, TRIGL, TRIGP, TGLPOCT, CHHD, CHHDX  
BNP No results for input(s): BNPP in the last 72 hours. Liver Enzymes Recent Labs 10/02/20 
0435 TP 7.7 ALB 5.0 AP 60 Thyroid Studies No results found for: T4, T3U, TSH, TSHEXT, TSHEXT Procedures/imaging: see electronic medical records for all procedures/Xrays and details which were not copied into this note but were reviewed prior to creation of Plan

## 2020-10-04 NOTE — PROGRESS NOTES
Physical Therapy Treatment Attempt Chart reviewed. Attempted Physical Therapy Treatment, however, patient unable to be seen due to: 
[]  Nausea/vomiting 
[]  Eating 
[]  Pain 
[x]  Patient too lethargic: pt sleeping, aroused to name, but with difficulty keeping eyes open with conversation []  Off Unit for testing/procedure 
[]  Dialysis treatment in progress  
[]  Telemetry Results []  Other:  
  
Will follow up later as patient's schedule allows.   
Thank you for this referral. 
 
Ron Aranda, PT, DPT

## 2020-10-04 NOTE — PROGRESS NOTES
[facility] Pulmonary, Critical Care, and Sleep Medicine Name: Keny Swanson MRN: [id number] : 1940 Hospital: [facility] [place] Date: 10/4/2020  Room: [facility]/[address] [place] [address] Note Consult requesting physician: Dr. Anahy Stacy Reason for Consult: COPD exacerbation Subjective/History of Present Illness:  
 
Patient is a 68 y.o. male with PMHx significant for dementia, CAD, alcohol use, tobacco dependence/smoker, BRANDEN intolerant with CPAP, chronic hypoxic respiratory failure on home O2, COPD; admitted with combative behavior likely due to dementia, COPD exacerbation, acute hypoxic and hypercapnic respiratory failure. Patient used to live other state, not able to take care of him and so recently moved to live with his son in [place]. He had MRI in 2020, not a candidate for CABG, had 6 stent placed, per history. He still an active smoker and alcohol abuser. Hx BRANDEN but intolerant to CPAP and not using CPAP. He is chronic hypoxic respiratory failure on home O2, but an active smoker. Patient admitted to ICU in altered mental status/severe agitation along with acute hypoxic and hypercapnic respiratory failure with COPD exacerbation, on HF NC. Patient moved out of ICU 2020. 
 
10/4/2020: 
Patient remains in telemetry unit. Patient appears confused and nonverbal at baseline; breathing with his mouth open. His son is at bedside and discussed with him. Son denies history of alcohol abuse; patient came from [place] about a month ago to live with son; history of confusion like issues in [place]; about 6 to 7 months ago, patient had swallow eval for unclear reasons, and was recommended thickened diet; patient son suspect that patient is having mild dementia prior to coming here; last drink of alcohol [1 view with steak] was few weeks ago during dinner with son. No worsening shortness of breath. No vomiting reported. Limited review of symptoms due to patient condition. No Known Allergies Past Medical History:  
Diagnosis Date  CAD (coronary artery disease)  Chronic obstructive pulmonary disease (HCC)   
 on 2 liters at home  Dementia ([address]  BRANDEN (obstructive sleep apnea) Past Surgical History:  
Procedure Laterality Date  CARDIAC SURG PROCEDURE UNLIST    
 stents x6 Social History Tobacco Use  Smoking status: Current Every Day Smoker Packs/day: 2.00  Smokeless tobacco: Never Used Substance Use Topics  Alcohol use: Yes Alcohol/week: 7.0 standard drinks Types: 7 Cans of beer per week Family History Problem Relation Age of Onset  Heart Disease Father Prior to Admission medications Medication Sig Start Date End Date Taking? Authorizing Provider  
clopidogreL (Plavix) 75 mg tab Take 75 mg by mouth daily. Indications: stent placement   Yes Kiko, MD Evelin  
potassium chloride (KLOR-CON) 10 mEq tablet Take 10 mEq by mouth daily. Yes Other, MD Evelin  
tamsulosin (Flomax) 0.4 mg capsule Take 0.4 mg by mouth. Indications: enlarged prostate with urination problem   Yes Kiko, MD Evelin  
doxycycline (ADOXA) 100 mg tablet Take 100 mg by mouth two (2) times a day. Yes Kiko, MD Evelin  
phenytoin ER (Dilantin Extended) 100 mg ER capsule Take 100 mg by mouth two (2) times a day. Indications: epilepsy   Yes Kiko, MD Evelin  
furosemide (Lasix) 40 mg tablet Take 40 mg by mouth daily. Indications: visible water retention   Yes Kiko, MD Evelin  
 
Current Facility-Administered Medications Medication Dose Route Frequency  0.45% sodium chloride infusion  50 mL/hr IntraVENous CONTINUOUS  
 methylPREDNISolone (PF) (SOLU-MEDROL) injection 40 mg  40 mg IntraVENous Q12H  pantoprazole (PROTONIX) 40 mg in 0.9% sodium chloride 10 mL injection  40 mg IntraVENous Q24H  
 insulin glargine (LANTUS) injection 10 Units  10 Units SubCUTAneous DAILY  insulin lispro (HUMALOG) injection   SubCUTAneous Q6H  
 [Held by provider] QUEtiapine (SEROquel) tablet 25 mg  25 mg Oral QHS  albuterol-ipratropium (DUO-NEB) 2.5 MG-0.5 MG/3 ML  3 mL Nebulization QID RT  
 budesonide (PULMICORT) 500 mcg/2 ml nebulizer suspension  500 mcg Nebulization BID RT  
 phenytoin (DILANTIN) injection 100 mg  100 mg IntraVENous BID  piperacillin-tazobactam (ZOSYN) 3.375 g in 0.9% sodium chloride (MBP/ADV) 100 mL MBP  3.375 g IntraVENous Q6H  
 sodium chloride (NS) flush 5-40 mL  5-40 mL IntraVENous Q8H  
 enoxaparin (LOVENOX) injection 40 mg  40 mg SubCUTAneous DAILY  [Held by provider] clopidogreL (PLAVIX) tablet 75 mg  75 mg Oral DAILY  [Held by provider] tamsulosin (FLOMAX) capsule 0.4 mg  0.4 mg Oral DAILY  nicotine (NICODERM CQ) 21 mg/24 hr patch 1 Patch  1 Patch TransDERmal DAILY  [Held by provider] atorvastatin (LIPITOR) tablet 40 mg  40 mg Oral DAILY  0.9% sodium chloride 1,000 mL with mvi, adult no. 4 with vit K 10 mL, thiamine 175 mg, folic acid 1 mg infusion   IntraVENous DAILY Objective:  
Vital Signs:   
Visit Vitals BP (!) 147/74 Pulse 89 Temp 97.7 °F (36.5 °C) Resp 20 Ht 5' 8\" (1.727 m) Wt 71.3 kg (157 lb 3.2 oz) SpO2 100% BMI 23.90 kg/m² O2 Device: Nasal cannula O2 Flow Rate (L/min): 3 l/min Temp (24hrs), Av.7 °F (36.5 °C), Min:97.2 °F (36.2 °C), Max:98.3 °F (36.8 °C) Intake/Output:  
Last shift:      No intake/output data recorded. Last 3 shifts: No intake/output data recorded. Intake/Output Summary (Last 24 hours) at 10/4/2020 1323 Last data filed at 10/4/2020 8215 Gross per 24 hour Intake 0 ml Output  Net 0 ml Last 3 Recorded Weights in this Encounter 10/02/20 0245 10/03/20 0397 10/04/20 9975 Weight: 78.5 kg (173 lb) 70.2 kg (154 lb 11.2 oz) 71.3 kg (157 lb 3.2 oz) Physical Exam:  
Comfortable; on 3 lits nc; acyanotic; appears old and frail HEENT: pupils not dilated, no scleral jaundice, moist oral mucosa Neck: No adenopathy or thyroid swelling CVS: Normal heart sounds with no murmurs; JVD not elevated RS: Symmetrical breath sounds; decreased breath sounds at bases; few basal crackles; no wheezes; not tachypneic or in distress; patient appears to be mouth breather Abd: soft, non tender, no hepatosplenomegaly, no abd distension Neuro: Confused; mumbling; moving extremities Extrm: no leg edema or swelling or clubbing Skin: no rash Lymphatic: no cervical or supraclavicular adenopathy 
 
 
Data:  
   
Recent Results (from the past 12 hour(s)) METABOLIC PANEL, BASIC Collection Time: 10/04/20  3:15 AM  
Result Value Ref Range Sodium 149 (H) 136 - 145 mmol/L Potassium 3.8 3.5 - 5.5 mmol/L Chloride 111 100 - 111 mmol/L  
 CO2 29 21 - 32 mmol/L Anion gap 9 3.0 - 18 mmol/L Glucose 94 74 - 99 mg/dL BUN 35 (H) 7.0 - 18 MG/DL Creatinine 0.83 0.6 - 1.3 MG/DL  
 BUN/Creatinine ratio 42 (H) 12 - 20 GFR est AA >60 >60 ml/min/1.73m2 GFR est non-AA >60 >60 ml/min/1.73m2 Calcium 9.1 8.5 - 10.1 MG/DL  
CBC WITH AUTOMATED DIFF Collection Time: 10/04/20  3:15 AM  
Result Value Ref Range WBC 15.7 (H) 4.6 - 13.2 K/uL  
 RBC 4.54 (L) 4.70 - 5.50 M/uL  
 HGB 13.7 13.0 - 16.0 g/dL HCT 43.9 36.0 - 48.0 % MCV 96.7 74.0 - 97.0 FL  
 MCH 30.2 24.0 - 34.0 PG  
 MCHC 31.2 31.0 - 37.0 g/dL  
 RDW 14.4 11.6 - 14.5 % PLATELET 446 751 - 829 K/uL MPV 10.0 9.2 - 11.8 FL  
 NEUTROPHILS 82 (H) 40 - 73 % LYMPHOCYTES 10 (L) 21 - 52 % MONOCYTES 8 3 - 10 % EOSINOPHILS 0 0 - 5 % BASOPHILS 0 0 - 2 %  
 ABS. NEUTROPHILS 12.9 (H) 1.8 - 8.0 K/UL  
 ABS. LYMPHOCYTES 1.6 0.9 - 3.6 K/UL  
 ABS. MONOCYTES 1.3 (H) 0.05 - 1.2 K/UL  
 ABS. EOSINOPHILS 0.0 0.0 - 0.4 K/UL  
 ABS. BASOPHILS 0.0 0.0 - 0.1 K/UL  
 DF AUTOMATED    
GLUCOSE, POC Collection Time: 10/04/20  5:03 AM  
Result Value Ref Range Glucose (POC) 114 (H) 70 - 110 mg/dL GLUCOSE, POC Collection Time: 10/04/20 11:46 AM  
Result Value Ref Range Glucose (POC) 128 (H) 70 - 110 mg/dL Lactic Acid Lactic acid Date Value Ref Range Status 09/29/2020 0.8 0.4 - 2.0 MMOL/L Final  
 
No results for input(s): LAC in the last 72 hours. Culture data during this hospitalization. All Micro Results Procedure Component Value Units Date/Time William Babb [[id number]] Collected:  09/29/20 0856 Order Status:  Completed Specimen:  Urine from Clean catch Updated:  09/30/20 1931 Special Requests: NO SPECIAL REQUESTS Culture result: No growth (<1,000 CFU/ML) Echo 9/29/20: 
Result status: Final result · Contrast used: DEFINITY. · Left Ventricle: Normal cavity size, wall thickness and systolic function (ejection fraction normal). The estimated EF is 55 - 60%. There is mild (grade 1) left ventricular diastolic dysfunction E/E' ratio = 14.08. 
· Tricuspid Valve: Tricuspid valve not well visualized. No stenosis. Tricuspid regurgitation is inadequate for estimation of right ventricular systolic pressure. CTA chest 9/28/2020: 
1. No evidence of pulmonary embolism. 2.  Bilateral bronchial thickening possibly related to bronchitis. 3. There is also bilateral lower lung field interstitial coarsening and faint 
lung opacities which may be related to hypoaeration. Edema or developing 
infiltrates considered less likely. 
  
 
Images report reviewed by me: 
CT 9/30 [place] [facility] [place] 09[place]28/20 CT HEAD WO CONT Narrative EXAM: CT head INDICATION: Headache. COMPARISON: None. TECHNIQUE: Axial CT imaging of the head was performed without intravenous 
contrast. Standard multiplanar coronal and sagittal reformatted images were 
obtained and are included in interpretation. One or more dose reduction techniques were used on this CT: automated exposure control, adjustment of the mAs and/or kVp according to patient size, and 
iterative reconstruction techniques. The specific techniques used on this CT 
exam have been documented in the patient's electronic medical record. Digital 
Imaging and Communications in Medicine (DICOM) format image data are available 
to nonaffiliated external healthcare facilities or entities on a secure, media 
free, reciprocally searchable basis with patient authorization for at least a 
12-month period after this study. _______________ FINDINGS: 
 
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 
bilateral frontal lobes, left greater than right. Associated ex vacuo dilatation 
of bilateral lateral ventricle frontal horns. Cerebral atrophy. Patchy 
periventricular, deep and subcortical white matter hypoattenuation which is 
nonspecific but likely represents chronic ischemic changes. No evidence of acute 
large vessel transcortical infarct or acute parenchymal hemorrhage. No midline 
shift or hydrocephalus. EXTRA-AXIAL SPACES AND MENINGES: There are no abnormal extra-axial fluid 
collections. CALVARIUM: Intact. SINUSES: Mild left frontal sinus opacification. OTHER: None. 
 
_______________ Impression IMPRESSION: 
 
No acute intracranial abnormality. 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 
greater than right. Atrophy with chronic ischemic changes. CXR reviewed by me: 
XR 10/4 Results from [facility] [place]counter encounter on 09/28/20 XR CHEST PORT Narrative A portable AP radiograph of the chest was obtained at 0516 hours: 
INDICATION:  Pneumonia. COMPARISON:  10/1/2020. FINDINGS:  
  
Heart and mediastinum: Unremarkable. Lungs and pleura: There has been significant decrease in the perihilar 
infiltrates since the prior study.  Small residual patchy densities remain in the 
 left base. Mild diffuse interstitial prominence persists. No definite pleural 
effusion. Aorta: Thoracic aorta is mildly tortuous and partially calcified. Philbert Tanya Bones: Within normal limits for age. Other: None. Impression Impression: 
 
Significant improvement since the prior study with decrease in the perihilar 
infiltrates particularly on the left side likely due to resolving edema. Patchy densities in the left base representing either atelectasis or minimal 
residual infiltrates. Mild underlying interstitial prominence could represent chronic fibrosis. IMPRESSION:  
Acute respiratory failure with hypoxia and hypercapnia (HCC) J96.01, J96.02 Chronic obstructive pulmonary disease with acute exacerbation (HCC) J44.1 Altered mental status R41.82 Dementia ([facility] [address] F03.90 · · Patient Active Problem List  
Diagnosis Code  Tobacco dependence F17.200  Chronic obstructive pulmonary disease with acute exacerbation (HCC) J44.1  Supplemental oxygen dependent Z99.81  
 Suspected 2019 novel coronavirus infection Z20.828  
 Hypoxia R09.02  
 CAD (coronary artery disease) I25.10  Combative behavior R46.89  
 Alcohol abuse F10.10  [facility] F03.90  
 BRANDEN (obstructive sleep apnea) G47.33  
 Acute respiratory distress R06.03  
 Acute respiratory failure with hypoxia and hypercapnia (HCC) J96.01, J96.02  
 Altered mental status R41.82  
 
 
 
· Code status: DNR/DNI  
  
RECOMMENDATIONS:  
Respirations remain stable Stable oxygen requirements on 3 L nasal cannula oxygen currently; significant improvement from recent high flow oxygen support Chest x-ray reviewedsignificant improvement bilateral infiltrates suggestive of improved pneumonia Hx COPD, chronic hypoxic respiratory failure on home O2, active smoker, BRANDEN intolerant to CPAP. CTA chest on admission negative for PEs; chest x-ray shows worsened left-sided pneumonialikely aspiration pneumonia. Patient could not tolerate BiPAP on admission due to agitation/dementia; he is currently DNR/DNI status. Continue nebulizersDuoNeb and budesonide irometry due to dementia. Wean IV Solu-Medroldecreased to 20 mg every 12 hours. AntibioticsZosyncomplete 7 days. Hx MI in 03/2020, not a candidate for CABG, 6 stent placed. Echo 9/29/2020: LVEF 55 to 60%. Grade 1 DD. Cardiac medications currently on hold since patient n.p.o. Rapid COVID19 negative; COVID-19 PCR also negative. Nicotine patch. Patient currently n.p.o.; SLP on case; discussed with son at bedside that patient high risk for aspirations; consider PEG tube feeding tube versus goals of care; advised patient to discuss with hospitalist and palliative care team on Monday. Continue aspiration precautions. DVT/GI prophylaxis Lovenox and pantoprazole. IV fluids half-normal saline started today for mild hyponatremia. Overall poor prognosis. DNR/DNI status. Palliative care team on case. 
____________________________________________________________________________ Pulmonary will sign off; please call if needed. High complexity decision making was performed during the evaluation of this patient Anderson Garcia MD 
10/4/2020

## 2020-10-05 NOTE — PALLIATIVE CARE
Telephone call placed to Lizette Artis, son of the patient. Left voicemail asking him to return my call.

## 2020-10-05 NOTE — PROGRESS NOTES
Problem: Falls - Risk of 
Goal: *Absence of Falls Description: Document Nishant Ulloa Fall Risk and appropriate interventions in the flowsheet. Outcome: Progressing Towards Goal 
Note: Fall Risk Interventions: 
Mobility Interventions: Communicate number of staff needed for ambulation/transfer Mentation Interventions: Adequate sleep, hydration, pain control Medication Interventions: Bed/chair exit alarm Elimination Interventions: Call light in reach, Bed/chair exit alarm History of Falls Interventions: Bed/chair exit alarm

## 2020-10-05 NOTE — ROUTINE PROCESS
Bedside shift change report given to Sharonda Briceno (oncoming nurse) by Mariama Carolina (offgoing nurse). Report included the following information SBAR, Procedure Summary, Intake/Output, MAR and Recent Results.

## 2020-10-05 NOTE — PROGRESS NOTES
Palliative Medicine Consult DR. [facility]: [phone] ([phone]) [facility]: [phone] [facility]: [phone] Patient Name: Mira Jimenez YOB: 1940 Date of Initial Consult: 9/29/2020; follow up: 10/5/2020 Reason for Consult: care decisions Requesting Provider: Augie Escobar MD 
Primary Care Physician: Other, MD Evelin 
  
 SUMMARY:  
Mira Jimenez is a 68 y.o. male with a past history of MI, CAD with stents, BRANDEN, COPD, chronic respiratory failure with home oxygen, traumatic brain injury and seizures who was admitted on 9/28/2020 from home with a diagnosis of exacerbation of COPD. Current medical issues leading to Palliative Medicine involvement include: COPD, dementia and goals of care. 9/30/2020: Palliative care team including INDIGO Gamboa and this NP met with patient in his ICU room. He is awake and alert. He was engaged in conversation with the team and appears to answer simple questions appropriately. He was able to tell us family members names and locations. He stated he has some documents with his  but was unable to identify them. At that time, we instructed patient we would follow up with his family. 10/5/2020: Palliative care team including Kevin Page RN and this NP met with patient at bedside. He easily awakens to voice. Patient is lethargic and falls asleep when being asked questions. PALLIATIVE DIAGNOSES:  
1. Advanced care decisions 2. Exacerbation of COPD 3. Acute on chronic respiratory failure 4. Altered mental status PLAN:  
 
10/5/2020: Telephone call placed to patient's son, Digna Schulz. Explained that patient is more lethargic and remains confused today. He is concerned about the results of the MBS and whether the records from [place] have been forwarded yet. We discussed the benefits and risks of the feeding tube including aspiration.  Digna Schulz agrees that knowing his father, he would not want any tubes in his body. I explained that the results of the MBS would only help us to determine whether his father needed a feeding tube. A feeding tube would not improve his COPD nor his significant CAD. In view of his advanced disease and ongoing mental status changes, suggested hospice services for discharge. Nunu Field stated he would like to visit with his father tonight and talk with his brother, Reji Sharma before making any further decisions. GOALS OF CARE: DNR/DNI  NO INTUBATION FOR RESPIRATORY DISTRESS. POST is on file with limited interventions. See previous notes shown below:  
 
9/30/2020: Telephone call placed to son, Nunu Field who the patient lives with. Patty Gonzalez stated he has a Durable POA that was done in [place], but unsure if it includes medical. He also stated he was his father's medical point of contact previously. Nunu Field is coming this afternoon to visit with his father and we will meet with them at that time to discuss AMD and goals of care. Follow up to this morning's call/visit, met with patient and his son, Shantanu Malik, in patient's room. Patient identified 4 children this morning: Francisco Rodney and Nunu Field. In meeting this afternoon, it was discovered that [address] [place] [address] are estranged from the family and neither the patient or Shantanu Meekser or Reji Sharma have any idea how to get in touch with them. Reji Sharma was on the cell phone with Delclaudiae  for much of this meeting and is in agreement with plans as agreed to by Arthure . Patient stated during our meeting that he would have [facility] as his MPO[facility]. Jennifer Tavera is patient's  from time he lived in [place]. Patient lacks insight into the appropriateness of appointee for MPOA. It was agreed by all to wait for further mental clearing before completing documentation. OF NOTE: PATIENT'S SON STATES HIS FATHER DOES NOT DRINK ALCOHOL nor have a diagnosis of dementia.  Patient has had a traumatic brain injury decades ago and is on dilantin for seizures. He has been instructed to not drink. No change in GOALS OF CARE: DNR/DNI, NO INTUBATION FOR RESPIRATORY DISTRESS. 
 
9/29/2020 1. Advanced care decisions: Palliative care team including INDIGO Nunn and this NP met with patient at bedside. He is currently resting on HFNC in the ICU. Patient's son, Shantanu Malik, had just left after conversing with the team. Patient lives with Shantanu Malik and his wife. Per conversations with the admitting physician, Shantanu Mlaik is the [place]. Will need to obtain copies of the AMD. Shantanu Malik has also instructed the physicians regarding patient's code status. Will follow for improvement vs decline in health for additional conversations and to obtain POST. GOALS OF CARE: DNR/DNI, no intubation for respiratory distress. 2.  Exacerbation of COPD: presented with 2 day history of increasing shortness of breath refractory to rescue inhaler. IV antibiotics & steroids initiated, currently on HFNC. Primary team managing. 3.  Acute on chronic respiratory failure: on home oxygen of 2 LPM baseline. Refuses CPAP for BRANDEN and uses supplemental oxygen per concentrator. 4.  Alcohol abuse: per history daily beer drinker. CIWA scale observation. This information later clarified and is untrue. 5.  Dementia: per history complicated by severe agitation and combative behavior. This information later clarified and is untrue. 6.  Initial consult note routed to primary continuity provider 7. Communicated plan of care with: Palliative IDT 
 
GOALS OF CARE: 
Patient/Health Care Proxy Stated Goals: Prolong life TREATMENT PREFERENCES:  
Code Status: DNR/DNI Advance Care Planning: No flowsheet data found. Medical Interventions: Limited additional interventions Other: As far as possible, the palliative care team has discussed with patient/health care proxy about goals of care/treatment preferences for patient.  
 
 HISTORY:  
 
 History obtained from: chart CHIEF COMPLAINT: lethargic HPI/SUBJECTIVE: The patient is:  
[] Verbal and participatory [x] Non-participatory due to:  
lethargy Clinical Pain Assessment (nonverbal scale for nonverbal patients): Clinical Pain Assessment Severity: 0 Activity (Movement): Lying quietly, normal position Duration: for how long has pt been experiencing pain (e.g., 2 days, 1 month, years) Frequency: how often pain is an issue (e.g., several times per day, once every few days, constant) FUNCTIONAL ASSESSMENT:  
 
Palliative Performance Scale (PPS): PPS: 20 
 
ECOG 
ECOG Status : Completely disabled PSYCHOSOCIAL/SPIRITUAL SCREENING:  
  
Any spiritual / Tenriism concerns: 
[] Yes /  [x] No 
 
Caregiver Burnout: 
[] Yes /  [] No /  [x] No Caregiver Present Anticipatory grief assessment:  
[x] Normal  / [] Maladaptive REVIEW OF SYSTEMS:  
 
Positive and pertinent negative findings in ROS are noted above in HPI. The following systems were [] reviewed / [x] unable to be reviewed as noted in HPI Other findings are noted below. Systems: constitutional, ears/nose/mouth/throat, respiratory, gastrointestinal, genitourinary, musculoskeletal, integumentary, neurologic, psychiatric, endocrine. Positive findings noted below. Modified ESAS Completed by: provider Pain: 0 Nausea: 0 Dyspnea: 2 PHYSICAL EXAM:  
 
Wt Readings from Last 3 Encounters:  
10/05/20 69.8 kg (153 lb 12.8 oz) Blood pressure 137/75, pulse (!) 101, temperature 97.8 °F (36.6 °C), resp. rate 19, height 5' 8\" (1.727 m), weight 69.8 kg (153 lb 12.8 oz), SpO2 98 %. Pain: 
Pain Scale 1: Visual 
Pain Intensity 1: 0 Pain Location 1: Generalized, Shoulder Pain Orientation 1: Right, Left Constitutional: Lethargic, elderly male, lying in bed in no apparent distress.   
Eyes: anicteric, pupil equal 
 Respiratory: breathing mildly labored with some accessory muscle use, oxygen per nasal cannula. Musculoskeletal: no deformity Skin: warm, dry Neurological: Following commands, moving all extremities, oriented X 2 (self and place). HISTORY:  
 
Principal Problem: 
  Chronic obstructive pulmonary disease with acute exacerbation ([address] (9/28/2020) Active Problems: 
  Tobacco dependence (9/28/2020) Supplemental oxygen dependent (9/29/2020) Hypoxia (9/29/2020) CAD (coronary artery disease) (9/29/2020) Combative behavior (9/29/2020) Alcohol abuse (9/29/2020) Dementia ([address] (9/29/2020) BRANDEN (obstructive sleep apnea) (9/29/2020) Acute respiratory distress (9/29/2020) Acute respiratory failure with hypoxia and hypercapnia ([address] (9/29/2020) Altered mental status (9/29/2020) Dysphagia (10/4/2020) Past Medical History:  
Diagnosis Date  CAD (coronary artery disease)  Chronic obstructive pulmonary disease (HCC)   
 on 2 liters at home  Dementia ([address]  BRANDEN (obstructive sleep apnea) Past Surgical History:  
Procedure Laterality Date  CARDIAC SURG PROCEDURE UNLIST    
 stents x6 Family History Problem Relation Age of Onset  Heart Disease Father History reviewed, no pertinent family history. Social History Tobacco Use  Smoking status: Current Every Day Smoker Packs/day: 2.00  Smokeless tobacco: Never Used Substance Use Topics  Alcohol use: Yes Alcohol/week: 7.0 standard drinks Types: 7 Cans of beer per week No Known Allergies Current Facility-Administered Medications Medication Dose Route Frequency  0.45% sodium chloride infusion  50 mL/hr IntraVENous CONTINUOUS  
 methylPREDNISolone (PF) (SOLU-MEDROL) injection 20 mg  20 mg IntraVENous Q12H  pantoprazole (PROTONIX) 40 mg in 0.9% sodium chloride 10 mL injection  40 mg IntraVENous Q24H  albuterol (PROVENTIL VENTOLIN) nebulizer solution 2.5 mg  2.5 mg Nebulization Q4H PRN  
 ipratropium (ATROVENT) 0.02 % nebulizer solution 0.5 mg  0.5 mg Nebulization Q4H PRN  
 insulin lispro (HUMALOG) injection   SubCUTAneous Q6H  
 glucose chewable tablet 16 g  4 Tab Oral PRN  
 glucagon (GLUCAGEN) injection 1 mg  1 mg IntraMUSCular PRN  
 dextrose 10% infusion 125-250 mL  125-250 mL IntraVENous PRN  
 [Held by provider] QUEtiapine (SEROquel) tablet 25 mg  25 mg Oral QHS  albuterol-ipratropium (DUO-NEB) 2.5 MG-0.5 MG/3 ML  3 mL Nebulization QID RT  
 budesonide (PULMICORT) 500 mcg/2 ml nebulizer suspension  500 mcg Nebulization BID RT  
 phenytoin (DILANTIN) injection 100 mg  100 mg IntraVENous BID  piperacillin-tazobactam (ZOSYN) 3.375 g in 0.9% sodium chloride (MBP/ADV) 100 mL MBP  3.375 g IntraVENous Q6H  
 sodium chloride (NS) flush 5-40 mL  5-40 mL IntraVENous Q8H  
 acetaminophen (TYLENOL) tablet 650 mg  650 mg Oral Q6H PRN Or  
 acetaminophen (TYLENOL) suppository 650 mg  650 mg Rectal Q6H PRN  polyethylene glycol (MIRALAX) packet 17 g  17 g Oral DAILY PRN  promethazine (PHENERGAN) tablet 12.5 mg  12.5 mg Oral Q6H PRN Or  
 ondansetron (ZOFRAN) injection 4 mg  4 mg IntraVENous Q6H PRN  
 enoxaparin (LOVENOX) injection 40 mg  40 mg SubCUTAneous DAILY  [Held by provider] clopidogreL (PLAVIX) tablet 75 mg  75 mg Oral DAILY  [Held by provider] tamsulosin (FLOMAX) capsule 0.4 mg  0.4 mg Oral DAILY  diphenhydrAMINE (BENADRYL) injection 12.5 mg  12.5 mg IntraVENous Q6H PRN  
 LORazepam (ATIVAN) tablet 1 mg  1 mg Oral Q1H PRN Or  
 LORazepam (ATIVAN) injection 1 mg  1 mg IntraVENous Q1H PRN  
 LORazepam (ATIVAN) tablet 2 mg  2 mg Oral Q1H PRN Or  
 LORazepam (ATIVAN) injection 2 mg  2 mg IntraVENous Q1H PRN  
 LORazepam (ATIVAN) injection 3 mg  3 mg IntraVENous Q15MIN PRN  
 nicotine (NICODERM CQ) 21 mg/24 hr patch 1 Patch  1 Patch TransDERmal DAILY  haloperidol lactate (HALDOL) injection 3 mg  3 mg IntraVENous Q4H PRN  
 [Held by provider] atorvastatin (LIPITOR) tablet 40 mg  40 mg Oral DAILY  0.9% sodium chloride 1,000 mL with mvi, adult no. 4 with vit K 10 mL, thiamine 508 mg, folic acid 1 mg infusion   IntraVENous DAILY  
 
 
 LAB AND IMAGING FINDINGS:  
 
Lab Results Component Value Date/Time WBC 15.2 (H) 10/05/2020 01:00 AM  
 HGB 14.1 10/05/2020 01:00 AM  
 PLATELET 237 65/31/8281 01:00 AM  
 
Lab Results Component Value Date/Time Sodium 152 (H) 10/05/2020 01:00 AM  
 Potassium 3.5 10/05/2020 01:00 AM  
 Chloride 116 (H) 10/05/2020 01:00 AM  
 CO2 28 10/05/2020 01:00 AM  
 BUN 35 (H) 10/05/2020 01:00 AM  
 Creatinine 0.78 10/05/2020 01:00 AM  
 Calcium 9.5 10/05/2020 01:00 AM  
  
Lab Results Component Value Date/Time Alk. phosphatase 60 10/02/2020 04:35 AM  
 Protein, total 7.7 10/02/2020 04:35 AM  
 Albumin 5.0 10/02/2020 04:35 AM  
 Globulin 2.7 10/02/2020 04:35 AM  
 
Lab Results Component Value Date/Time INR 1.0 09/28/2020 09:02 PM  
 Prothrombin time 13.5 09/28/2020 09:02 PM  
 aPTT 33.0 09/28/2020 09:02 PM  
  
No results found for: IRON, FE, TIBC, IBCT, PSAT, FERR No results found for: PH, PCO2, PO2 No components found for: [place] Lab Results Component Value Date/Time  09/29/2020 03:04 PM  
 CK - MB 2.6 09/29/2020 03:04 PM  
  
 
   
 
Total time: 25 minutes Counseling / coordination time, spent as noted above > 50% counseling / coordination: 20 minutes with patient, family, RN Prolonged service was provided for  []30 min   []75 min in face to face time in the presence of the patient, spent as noted above. Time Start:  
Time End:  
Note: this can only be billed with 59747 (initial) or 86202 (follow up). If multiple start / stop times, list each separately.

## 2020-10-05 NOTE — DIABETES MGMT
GLYCEMIC CONTROL PROGRESS NOTE: 
 
- chart reviewed, no known h/o DM 
- Solumedrol 20 mg Q 12 hours , steroid associated hyperglycemia - TDD = Lantus 10 units  
- FBG trending down, NPO for swallowing evaluation, recommend hold Lantus Recent Glucose Results:  
Lab Results Component Value Date/Time GLU 94 10/05/2020 01:00 AM  
 GLUCPOC 90 10/05/2020 06:09 AM  
 GLUCPOC 82 10/05/2020 12:03 AM  
 GLUCPOC 104 10/04/2020 06:03 PM  
 
Yanely Armenta MS, RN, CDE Glycemic Control Team 
[phone] Pager [phone] (M-TH 8:00-4:30P) *After Hours pager [phone]

## 2020-10-05 NOTE — PROGRESS NOTES
Hospitalist Progress Note Patient: Soraida Gilbert MRN: [id number]  CSN: [id number] YOB: 1940  Age: 68 y.o. Sex: male DOA: 9/28/2020 LOS:  LOS: 6 days Assessment/Plan Patient Active Problem List  
Diagnosis Code  Tobacco dependence F17.200  Chronic obstructive pulmonary disease with acute exacerbation (HCC) J44.1  Supplemental oxygen dependent Z99.81  
 Hypoxia R09.02  
 CAD (coronary artery disease) I25.10  Combative behavior R46.89  
 Alcohol abuse F10.10  Dementia ([address] F03.90  
 BRANDEN (obstructive sleep apnea) G47.33  
 Acute respiratory distress R06.03  
 Acute respiratory failure with hypoxia and hypercapnia (HCC) J96.01, J96.02  
 Altered mental status R41.82  Dysphagia R13.10  
  
 
 
 
67 y/o male with COPD (2L supplemental oxygen dependent), BRANDEN, CAD, alcohol abuse, and tobacco dependence is admitted with a COPD exacerbation, hypoxia. He required to be started on high flow oxygen and ICU admission. He is now on oxygen by nasal cannula, answering to questions. COVID 19 rapid test negative. Leukocytosis secondary to solumedrol, Sodium elevated, started on 1/2 NS, echo with EF of 55-60%. Still NPO for significant dysphagia and aspiration risk. For MBS tomorrow. Called and spoke with son on phone at length, discussed his hospitalization, especially dysphagia, he is choking on food prompting NPO. Will try MBS tomorrow. Discussed options of PEG tube vs continue oral intake with risk of aspiration vs hospice. He will talk to other family members and make decision. Acute on chronic respiratory failure with hypoxia- 
Secondary to COPD exacerbation, now improved, he is on oxygen by nasal cannula. CTA chest negative for PE, bronchial wall thickening and possible basilar atelectasis versus hypoinflation. COPD exacerbation- 
continue with IV Solu-Medrol, duo nebs, Pulmicort. Continue with empiric antibiotics. Aspiration pneumonia - Antibiotics, including anaerobic coverage. On zosyn Oxygen support. Head of the bed elevated to 30 degrees. Strict aspiration precautions Swallow evaluation. If needed follow up CXR. Dysphagia- 
Seen by SLP, recommend n.p.o., for MBS. CAD- No chest pain, history of stent placement. Echo with EF of 55 to 69%, grade 1 diastolic dysfunction. Medications on hold secondary to dysphagia and n.p.o. Alcohol use- 
Continue with CIWA protocol and banana bag. Tobacco abuse- 
Nicotine patch Hypernatremia - 
Started on 1/2 NS, follow sodium levels. Continue with Dilantin, change dose to IV. Elevated blood sugar secondary to steroid use, continue with sliding scale insulin. DVT prophylaxis with Lovenox, Poor prognosis, palliative care following. Disposition : TBD Review of systems General: No fevers or chills. Cardiovascular: No chest pain or pressure. No palpitations. Pulmonary: see above. Gastrointestinal: No nausea, vomiting. Physical Exam: 
General: Awake, cooperative, no acute distress   
HEENT: NC, Atraumatic. PERRLA, anicteric sclerae. Lungs: CTA Bilaterally. No Wheezing/Rhonchi/Rales. Heart:  S1 S2,  No murmur, No Rubs, No Gallops Abdomen: Soft, Non distended, Non tender.  +Bowel sounds, Extremities: No c/c/e Psych:   Not anxious or agitated. Neurologic:  No acute neurological deficit. Vital signs/Intake and Output: 
Visit Vitals BP (!) 140/76 Pulse 100 Temp 98 °F (36.7 °C) Resp 18 Ht 5' 8\" (1.727 m) Wt 69.8 kg (153 lb 12.8 oz) SpO2 98% BMI 23.39 kg/m² Current Shift:  No intake/output data recorded. Last three shifts:  No intake/output data recorded. Labs: Results:  
   
Chemistry Recent Labs 10/05/20 
0100 10/04/20 
0315 10/03/20 
1534 GLU 94 94 119* * 149* 144  
K 3.5 3.8 3.7 * 111 107 CO2 28 29 30 BUN 35* 35* 36* CREA 0.78 0.83 0.82 CA 9.5 9.1 9.2 AGAP 8 9 7 BUCR 45* 42* 44* CBC w/Diff Recent Labs 10/05/20 
0100 10/04/20 
0315 10/03/20 
0335 WBC 15.2* 15.7* 13.5*  
RBC 4.65* 4.54* 4.19* HGB 14.1 13.7 13.0 HCT 45.4 43.9 40.3  293 273 GRANS 89* 82* 83* LYMPH 6* 10* 10* EOS 0 0 0 Cardiac Enzymes No results for input(s): CPK, CKND1, SAMMY in the last 72 hours. No lab exists for component: Loyce Factor Coagulation No results for input(s): PTP, INR, APTT, INREXT, INREXT in the last 72 hours. Lipid Panel No results found for: CHOL, CHOLPOCT, CHOLX, CHLST, CHOLV, 944053, HDL, HDLP, LDL, LDLC, DLDLP, 761703, VLDLC, VLDL, TGLX, TRIGL, TRIGP, TGLPOCT, CHHD, CHHDX  
BNP No results for input(s): BNPP in the last 72 hours. Liver Enzymes No results for input(s): TP, ALB, TBIL, AP in the last 72 hours. No lab exists for component: SGOT, GPT, DBIL Thyroid Studies No results found for: T4, T3U, TSH, TSHEXT, TSHEXT Procedures/imaging: see electronic medical records for all procedures/Xrays and details which were not copied into this note but were reviewed prior to creation of Plan

## 2020-10-05 NOTE — PROGRESS NOTES
Assumed care of pt at this time. Pt is on 3L NC, lungs coarse with expiratory wheezing. Bowel sounds active in all 4 quadrants. Pt left in rm with no distress, resting. Will continue to monitor. 0009- Called Respirtory due to pt sating at 80 with 3L NC. Was informed given history not to worry, but since pt is a mouth breather try placing canula in mouth and call back if Sat is under 87. 
 
0011- Pt is now Sating at >90 
 
0555- Pt tried getting out of bed and pulled IV hub out. PT cleaned up, IV hub replaced, fluids infusing. Pt left in the room no signs of distress. Bedside and Verbal shift change report given to Keaton Limon RN (oncoming nurse) by Phil Walden RN (offgoing nurse). Report included the following information SBAR, Kardex, Intake/Output, MAR and Recent Results.

## 2020-10-05 NOTE — ROUTINE PROCESS
Bedside shift change report given to Seng Gross  (oncoming nurse) by Dot Mcguire (offgoing nurse). Report included the following information SBAR, Kardex, Procedure Summary, Intake/Output, MAR and Recent Results.

## 2020-10-05 NOTE — PROGRESS NOTES
Comprehensive Nutrition Assessment Type and Reason for Visit:   
 
Nutrition Recommendations/Plan: Diet: Adv diet per MD; Avoid prolonged NPO diet order as it does not meet estimated needs (Day 5) EN: if unable to adv diet pt will need nutrition support to meet estimated needs-will continue to monitor for further recc Other: Start bowel regimen as pt has not had a BM this admission (day 6) Nutrition Assessment:  pt admitted w/ acute on chronic resp failure w/ hypoxia, COPD exacerbation, leukocytosis, aspiration PNA, dysphagia-schedule for MBS today, hypernatremia, hx of ETOH abuse, CAD Estimated Daily Nutrient Needs: 
Energy (kcal):  (P) 2166 Protein (g):  (P)  Fluid (ml/day):  (P) 2166 Nutrition Related Findings:  (P) Meds: banana bag, lantus, humalog, methylprednisolone, protonix, dilatin, miralax, SLP following pt MBS schedule for today, Na-152 No BM during admission day 6 Wounds:   
(P) None Current Nutrition Therapies: DIET NPO Anthropometric Measures: 
· Height:  5' 8\" (172.7 cm) · Current Body Wt:  69.4 kg (153 lb) · Admission Body Wt:      
· Usual Body Wt:  (no wt hx in chart) · Ideal Body Wt:  154 lbs:  99.4 % · Adjusted Body Weight:   ; Weight Adjustment for: · Adjusted BMI:      
· BMI Category:       
 
Nutrition Diagnosis:  
· (P) Inadequate oral intake related to (P) biting/chewing (masticatory) difficulty, swallowing difficulty as evidenced by (P) NPO or clear liquid status due to medical condition Nutrition Interventions:  
Food and/or Nutrient Delivery: (P) Start oral diet, Start oral nutrition supplement Nutrition Education and Counseling: (P) No recommendations at this time Coordination of Nutrition Care: (P) Continued inpatient monitoring, Interdisciplinary rounds, Other (specify)(Bowel Regimen no BM during this admission) Goals: (P) Find safe diet and adv diet per MD in the next 1-2days Nutrition Monitoring and Evaluation:  
Behavioral-Environmental Outcomes:   
Food/Nutrient Intake Outcomes: (P) Diet advancement/tolerance Physical Signs/Symptoms Outcomes: (P) Biochemical data, Chewing or swallowing, GI status, Nutrition focused physical findings, Skin, Weight Discharge Planning:   
(P) Too soon to determine Electronically signed by Bruce Gross on 10/5/2020 at 9:54 AM

## 2020-10-05 NOTE — PROGRESS NOTES
1942: Bedside shift change report given to Jennifer Schwartz RN (oncoming nurse) by Sergio DUMONT RN (offgoing nurse). Report included the following information SBAR, Kardex and MAR. 
 
1950: Bed alarm is on. Patient attempting to jump out of bed. Ripped out IV. Removed oxygen and O2 monitor as well as hospital gown. Patient is very restless, agitated and confused. 2220: 24 gauge in right hand, wrapped in kerlix and coban. 2225: Ativan 2mg given for agitated state. 0000: Patient is resting quietly. 3L of oxygen on. 9553: Incontinence care completed. Gown and bed linens changed. 0404: Patient is restless again. 1mg Ativan given PRN. Mouth care performed. 200: Called Respiratory -- stated she was in ICU and that she would come access the patient later. 9694: Respiratory stated that he was like this the other day too and that his O2 is within range. Shift summary: Patient remained NSR. Patient's O2 ranged from 91s to 98s on 3L NC. Patient is a mouth breather. Required Ativan for combative episodes. Patient will pull at gown and leads. 0720: Bedside shift change report given to Ryder Laboy RN (oncoming nurse) by Sergio DUMONT RN (offgoing nurse). Report included the following information SBAR, Kardex and MAR.

## 2020-10-05 NOTE — PROGRESS NOTES
Speech-Language Pathology ST f/u. Patient is scheduled for MBS for tomorrow, 10/6/20 at 1000 in Radiology. D/w patient's nurse, Tomy Morin, at length. ST f/u. Thank you. MILTON Hernández.Ed, [id number] [facility]

## 2020-10-05 NOTE — PROGRESS NOTES
Problem: Dysphagia (Adult) Goal: *Acute Goals and Plan of Care (Insert Text) Description: Recommendations: 
Diet: NPO Meds: Non-oral 
Aspiration Precautions Oral Care TID Goals:  Patient will: 1. Tolerate PO trials with 0 s/s overt distress in 4/5 trials 2. Utilize compensatory swallow strategies/maneuvers (decrease bite/sip, size/rate, alt. liq/sol) with min cues in 4/5 trials 3. Perform oral-motor/laryngeal exercises to increase oropharyngeal swallow function with min cues 4. Complete an objective swallow study (i.e., MBSS) to assess swallow integrity, r/o aspiration, and determine of safest LRD, min A Outcome: Progressing Towards Goal 
 
SPEECH LANGUAGE PATHOLOGY DYSPHAGIA TREATMENT Patient: Soraida Gilbert (82 y.o. male) Date: 10/5/2020 Diagnosis: COPD exacerbation ([facility]) [J44.1] Acute respiratory distress [R06.03] Chronic obstructive pulmonary disease with acute exacerbation ([facility]) Precautions: Aspiration, Fall, DNR, DNI, Seizure PLOF: Living with family ASSESSMENT: 
Followed up with dysphagia intervention. Patient was unable to verbalize orientation. Presented with ice chip trials x2 and honey-thick liquid via 1/2 tsp; demo delayed oral bolus manipulation, decreased hyolaryngeal elevation, delayed weak, persistent cough. Recommend patient remain NPO with strict aspiration precautions. Ice chips sparingly for pleasure. Recommend MBSS next day to further assess swallow integrity and rule out aspiration. SLP will continue to follow as indicated. Progression toward goals: 
[]         Improving appropriately and progressing toward goals [x]         Improving slowly and progressing toward goals 
[]         Not making progress toward goals and plan of care will be adjusted PLAN: 
Recommendations and Planned Interventions: NPO Patient continues to benefit from skilled intervention to address the above impairments. Continue treatment per established plan of care. Discharge Recommendations: To Be Determined SUBJECTIVE:  
Patient stated N/A. OBJECTIVE:  
Cognitive and Communication Status: 
Neurologic State: Sleeping Orientation Level: Unable to verbalize Cognition: Unable to assess (comment)(Pt sleeping) Perception: Appears intact Perseveration: Perseverates during mobility Safety/Judgement: Awareness of environment Dysphagia Treatment: 
Oral Assessment: 
Oral Assessment Labial: No impairment Dentition: Edentulous Oral Hygiene: [address] Lingual: Decreased rate, Decreased strength Velum: No impairment Mandible: No impairment P.O. Trials: 
 Patient Position: TMA 54 Vocal quality prior to P.O.: Low volume Consistency Presented: Ice chips How Presented: SLP-fed/presented, Spoon Bolus Acceptance: No impairment Bolus Formation/Control: Impaired Type of Impairment: Delayed, Mastication Propulsion: Delayed (# of seconds) Oral Residue: None Initiation of Swallow: Delayed (# of seconds) Laryngeal Elevation: Weak Aspiration Signs/Symptoms: Change vocal quality, Increase in RR, Strong cough, Facial redness Pharyngeal Phase Characteristics: Altered vocal quality, Audible swallow, Poor endurance Effective Modifications: None Cues for Modifications: Moderate Oral Phase Severity: Mild-moderate Pharyngeal Phase Severity : Severe PAIN: 
Pain level pre-treatment: 0/10 Pain level post-treatment: 0/10 After treatment:  
[]              Patient left in no apparent distress sitting up in chair 
[x]              Patient left in no apparent distress in bed 
[x]              Call bell left within reach [x]              Nursing notified 
[]              Family present 
[]              Caregiver present 
[]              Bed alarm activated COMMUNICATION/EDUCATION:  
[x] Aspiration precautions; swallow safety; compensatory techniques [x]        Patient unable to participate in education; education ongoing with staff []  Posted safety precautions in patient's room. [] Oral-motor/laryngeal strengthening exercises Sapna Zavala, SLP Time Calculation: 13 mins

## 2020-10-05 NOTE — PROGRESS NOTES
Problem: Self Care Deficits Care Plan (Adult) Goal: *Acute Goals and Plan of Care (Insert Text) Description: Initial Occupational Therapy Goals (9/30/2020) Within 7 day(s): 1. Patient will perform grooming seated EOB with setup x 10 minutes for increased independence with ADLs. 2. Patient will perform UB dressing with setup/Abdulaziz for increased independence with ADLs. 3. Patient will perform LB dressing with Abdulaziz & A/E PRN for increased independence with ADLs. 4. Patient will perform all aspects of toileting with minimal A for increased independence in ADLs 5. Patient will independently apply energy conservation techniques with 1 verbal cue(s) for increased independence with ADLs. 6. Patient will utilize good body mechanics during ADLs with 1 verbal cue(s). 7. Patient will perform functional transfer with minimal assist in preparation for ADLs. Outcome: Not Progressing Towards Goal 
 
OCCUPATIONAL THERAPY TREATMENT Patient: Dante Pan (43 y.o. male) Date: 10/5/2020 Diagnosis: COPD exacerbation ([facility] [address] [J44.1] Acute respiratory distress [R06.03] Chronic obstructive pulmonary disease with acute exacerbation ([facility] [address] Precautions: Aspiration, Fall, DNR, DNI, Seizure PLOF: patient was independent at baseline; had been living alone in [place], but recently moved to [place] to live w/ son Chart, occupational therapy assessment, plan of care, and goals were reviewed. ASSESSMENT: 
Patient attempting to exit bed. Agonal breathing and pointing to chair as if he wanted to sit up in chair. Extensive time to decrease agitation and slow breathing. Pt w/o clothing. Able to distract patient and pt willing to don gown. With increased time and story sharing, pt able to return self back into bed and scoot self up in bed. Pt educated on MBSS as pt wants to eat. Pt initially declining, but then when reinforced that if he passes, then he could eat, pt agreeable.  And pt coaxed into returning to bed to rest before exam. Pt without significant improvement in ADLs and lack of nutrition source is concerning. Will attempt to treat 1-2 more times and await MBSS results as pt may be best suited going comfort care. Progression toward goals: 
[]          Improving appropriately and progressing toward goals 
[]          Improving slowly and progressing toward goals [x]          Not making progress toward goals and plan of care will be monitored & adjusted on next session s/p MBSS PLAN: 
Patient continues to benefit from skilled intervention to address the above impairments. Continue treatment per established plan of care. Discharge Recommendations:  LTC vs Hospice Further Equipment Recommendations for Discharge:  N/A  
 
SUBJECTIVE:  
Patient stated Robin Mehta eat.  OBJECTIVE DATA SUMMARY:  
Cognitive/Behavioral Status: 
Neurologic State: Sleeping Orientation Level: Unable to verbalize Cognition: Unable to assess (comment)(Pt sleeping) Safety/Judgement: Awareness of environment Functional Mobility and Transfers for ADLs: 
 Bed Mobility: 
Rolling: Contact guard assistance Supine to Sit: Contact guard assistance Sit to Supine: Minimum assistance Scooting: Contact guard assistance Balance: 
Sitting: Intact Standing: Impaired; With support Standing - Static: Fair;Poor Standing - Dynamic : Poor ADL Intervention: 
Grooming Washing Face: Set-up Cognitive Retraining Problem Solving: Inductive reason; Identifying the task; Identifying the problem;General alternative solution;Deductive reason; Awareness of environment Executive Functions: Executing cognitive plans;Managing time;Regulating behavior Organizing/Sequencing: Breaking task down;Prioritizing Attention to Task: Distractibility; Single task Pain: 
Pain level pre-treatment: 0/10 Pain level post-treatment: 0/10 Pain Intervention(s): Medication administered by RN (see MAR); Rest, Ice, Repositioning Response to intervention: Nurse notified, See doc flow sheet Please refer to the flowsheet for vital signs taken during this treatment. After treatment:  
[]  Patient left in no apparent distress sitting up in chair 
[x]  Patient left in no apparent distress in bed 
[x]  Call bell left within reach [x]  Nursing notified/[employer] 
[]  Caregiver present 
[]  Bed alarm activated COMMUNICATION/EDUCATION:  
[x] Role of Occupational Therapy in the acute care setting 
[x] Home safety education was provided and the patient/caregiver indicated understanding. [x] Patient/family have participated as able in working towards goals and plan of care. [x] Patient/family agree to work toward stated goals and plan of care. [] Patient understands intent and goals of therapy, but is neutral about his/her participation. [] Patient is unable to participate in goal setting and plan of care. Thank you for this referral. 
Milagros Cedillo, OTR/L, CSRS, CDCS, CFPS Time Calculation: 38 mins

## 2020-10-05 NOTE — PROGRESS NOTES
DC Plan: TBD pending care decisions Chart reviewed. Pt admitted to tele. Palliative following. Per palliative notes, plan is to have pts family visit today to determine if they agree with home hospice. Per CMs previous conversation with pts son he would prefer a home dc plan, rather than facility if possible. Local SNF referrals were placed in the event this changes. Pt does have neg covid. CM will cont to follow for transition of care needs [phone]

## 2020-10-05 NOTE — PROGRESS NOTES
Problem: Mobility Impaired (Adult and Pediatric) Goal: *Acute Goals and Plan of Care (Insert Text) Description: PT goals to be met in 1-7 days: Pt will be able to perform supine<>sit S for transfers at home. Pt will be able to perform sit<>stand S for increased ability to transfer at home safely. Pt will be able to participate in gt training >100' w/ RW, O2 support, GB and SBA for improved ability in home upon d/c. Pt will be able to perform stair training >2 steps, B/U rail and CGA to obtain safe entry into home upon d/c. Pt will be educated regarding HEP per MD protocol for optimal AROM/strength outcomes. Outcome: Progressing Towards Goal 
 PHYSICAL THERAPY TREATMENT Patient: Nishant Conteh (16 y.o. male) Date: 10/5/2020 Diagnosis: COPD exacerbation ([facility] [address] [J44.1] Acute respiratory distress [R06.03] Chronic obstructive pulmonary disease with acute exacerbation ([facility] [address] Precautions: Fall, Aspiration, DNR, DNI, Seizure Chart, physical therapy assessment, plan of care and goals were reviewed. ASSESSMENT: 
Pt found deeply resting, but able to wake for session. Pt provided with assist to EOB, where SPO2 readings are difficult to obtain. 79-92% on 3L NC. Pt stands, with immediate LOB toward L side. Pt requires 3 standing attempts, to initiate gait. Pt with poor RW spacing and manipulation, pushing device into objects and changing directions without warning. Significant SOB and mouth breathing,cause for extended seated rest, before second gait trial. SOB remains diffciult to read, but 89-92% maintained with increase to 4L NC. Identical SOB observed, lasting 3 min. Returned to supine and positioned for comfort. Bed alarm activated. Placement is suggested. RN and CM informed of findings. CGA/min A needed for all OOB. Progression toward goals: 
[]      Improving appropriately and progressing toward goals [x]      Improving slowly and progressing toward goals []      Not making progress toward goals and plan of care will be adjusted PLAN: 
Patient continues to benefit from skilled intervention to address the above impairments. Continue treatment per established plan of care. Discharge Recommendations:  [facility] Further Equipment Recommendations for Discharge:  rolling walker SUBJECTIVE:  
Patient stated ([place]) OBJECTIVE DATA SUMMARY:  
Critical Behavior: 
Neurologic State: Sleeping Orientation Level: Unable to verbalize Cognition: Unable to assess (comment)(Pt sleeping) Safety/Judgement: Awareness of environment Functional Mobility Training: 
Bed Mobility: 
Rolling: Contact guard assistance Supine to Sit: Contact guard assistance Sit to Supine: Minimum assistance Scooting: Contact guard assistance Transfers: 
Sit to Stand: Minimum assistance Stand to Sit: Contact guard assistance Balance: 
Sitting: Intact Standing: Impaired; With support Standing - Static: Fair;Poor Standing - Dynamic : Poor Ambulation/Gait Training: 
Distance (ft): 70 Feet (ft)(30+40) Assistive Device: Gait belt Gait Abnormalities: Ataxic;Scissoring Base of Support: Narrowed; Center of gravity altered Speed/Netta: Slow Step Length: Right shortened;Left shortened Therapeutic Exercises:  
 
 
EXERCISE Sets Reps Active Active Assist  
Passive Self ROM Comments Ankle Pumps 1 30 [x] [] [] [] Quad Sets/Glut Sets 1 10 [x] [] [] [] Hamstring Sets   [] [] [] [] Short Arc Quads   [] [] [] [] Heel Slides 1 5 [x] [] [] [] Straight Leg Raises   [] [] [] [] Hip Abd/Add 1 10 [x] [] [] [] Long Arc Quads 1 10 [x] [] [] [] Seated Marching   [] [] [] []   
Standing Marching   [] [] [] []   
   [] [] [] []   
 
 
Pain: 
Pain Scale 1: Visual 
Pain Intensity 1: 0 Pain out: 0 Activity Tolerance:  
Fair- 
Please refer to the flowsheet for vital signs taken during this treatment. After treatment: [] Patient left in no apparent distress sitting up in chair 
[x] Patient left in no apparent distress in bed 
[x] Call bell left within reach [x] Nursing notified 
[] Caregiver present [x] Bed alarm activated Laura Alegria PTA Time Calculation: 33 mins

## 2020-10-06 NOTE — PROGRESS NOTES
Problem: Mobility Impaired (Adult and Pediatric) Goal: *Acute Goals and Plan of Care (Insert Text) Description: PT goals to be met in 1-7 days: Pt will be able to perform supine<>sit S for transfers at home. Pt will be able to perform sit<>stand S for increased ability to transfer at home safely. Pt will be able to participate in gt training >100' w/ RW, O2 support, GB and SBA for improved ability in home upon d/c. Pt will be able to perform stair training >2 steps, B/U rail and CGA to obtain safe entry into home upon d/c. Pt will be educated regarding HEP per MD protocol for optimal AROM/strength outcomes. Outcome: Not Progressing Towards Goal 
  
PT session held due to: 
[x]  Pt with eyes closed and combative with attempts to move his extremities. []  RN Communication/ suggestion 
[]  Extreme Pain 
[]  Dialysis treatment in progress. Will f/u tomorrow. Thank you.  
Lucie Han, PTA

## 2020-10-06 NOTE — PROGRESS NOTES
Hospitalist Progress Note Patient: Megan Muir MRN: [id number]  CSN: [id number] YOB: 1940  Age: 68 y.o. Sex: male DOA: 9/28/2020 LOS:  LOS: 7 days Assessment/Plan Patient Active Problem List  
Diagnosis Code  Tobacco dependence F17.200  Chronic obstructive pulmonary disease with acute exacerbation (HCC) J44.1  Supplemental oxygen dependent Z99.81  
 Hypoxia R09.02  
 CAD (coronary artery disease) I25.10  Combative behavior R46.89  
 Alcohol abuse F10.10  Dementia ([address] F03.90  
 BRANDEN (obstructive sleep apnea) G47.33  
 Acute respiratory distress R06.03  
 Acute respiratory failure with hypoxia and hypercapnia (HCC) J96.01, J96.02  
 Altered mental status R41.82  Dysphagia R13.10  
  
 
 
 
67 y/o male with COPD (2L supplemental oxygen dependent), BRANDEN, CAD, alcohol abuse, and tobacco dependence is admitted with a COPD exacerbation, hypoxia. He required to be started on high flow oxygen and ICU admission. He is now on oxygen by nasal cannula, answering to questions. COVID 19 rapid test negative. Leukocytosis secondary to solumedrol, Sodium elevated, started on 1/2 NS, echo with EF of 55-60%. Still NPO for significant dysphagia and aspiration risk. Called and spoke with son on phone at length, discussed his hospitalization, especially dysphagia, he is choking on food prompting NPO. Will try MBS tomorrow. Discussed options of PEG tube vs continue oral intake with risk of aspiration vs hospice. He will talk to other family members and make decision. Acute on chronic respiratory failure with hypoxia- 
Secondary to COPD exacerbation, now improved, he is on oxygen by nasal cannula. CTA chest negative for PE, bronchial wall thickening and possible basilar atelectasis versus hypoinflation. COPD exacerbation- 
continue with IV Solu-Medrol, duo nebs, Pulmicort. Continue with empiric antibiotics.  
 
Aspiration pneumonia - 
 Antibiotics, including anaerobic coverage. On zosyn Oxygen support. Head of the bed elevated to 30 degrees. Strict aspiration precautions Swallow evaluation. If needed follow up CXR. Dysphagia- 
Seen by SLP, recommend n.p.o. MBS cancelled due to high risk of aspiration. CAD- No chest pain, history of stent placement. Echo with EF of 55 to 88%, grade 1 diastolic dysfunction. Medications on hold secondary to dysphagia and n.p.o. Alcohol use- 
Continue with CIWA protocol and banana bag. Tobacco abuse- 
Nicotine patch Hypernatremia - 
Started on 1/2 NS, follow sodium levels. Continue with Dilantin, change dose to IV. Elevated blood sugar secondary to steroid use, continue with sliding scale insulin. DVT prophylaxis with Lovenox, Poor prognosis, palliative care following. Disposition : TBD Review of systems General: No fevers or chills. Cardiovascular: No chest pain or pressure. No palpitations. Pulmonary: see above. Gastrointestinal: No nausea, vomiting. Physical Exam: 
General: Awake, cooperative, no acute distress   
HEENT: NC, Atraumatic. PERRLA, anicteric sclerae. Lungs: CTA Bilaterally. No Wheezing/Rhonchi/Rales. Heart:  S1 S2,  No murmur, No Rubs, No Gallops Abdomen: Soft, Non distended, Non tender.  +Bowel sounds, Extremities: No c/c/e Psych:   Not anxious or agitated. Neurologic:  No acute neurological deficit. Vital signs/Intake and Output: 
Visit Vitals BP (!) 143/78 (BP 1 Location: Right arm, BP Patient Position: At rest) Pulse (!) 103 Temp 97.8 °F (36.6 °C) Resp 30 Ht 5' 8\" (1.727 m) Wt 69.8 kg (153 lb 12.8 oz) SpO2 95% BMI 23.39 kg/m² Current Shift:  No intake/output data recorded. Last three shifts:  No intake/output data recorded. Labs: Results:  
   
Chemistry Recent Labs 10/06/20 
0318 10/05/20 
0100 10/04/20 
0315 * 94 94 * 152* 149*  
K 3.7 3.5 3.8 * 116* 111  
 CO2 28 28 29 BUN 31* 35* 35* CREA 0.74 0.78 0.83 CA 9.1 9.5 9.1 AGAP 5 8 9 BUCR 42* 45* 42* CBC w/Diff Recent Labs 10/06/20 
0318 10/05/20 
0100 10/04/20 
0315 WBC 15.0* 15.2* 15.7*  
RBC 4.40* 4.65* 4.54* HGB 13.2 14.1 13.7 HCT 43.0 45.4 43.9  334 293 GRANS 88* 89* 82* LYMPH 8* 6* 10* EOS 0 0 0 Cardiac Enzymes No results for input(s): CPK, CKND1, SAMMY in the last 72 hours. No lab exists for component: Thelda Sharmin Coagulation No results for input(s): PTP, INR, APTT, INREXT, INREXT in the last 72 hours. Lipid Panel No results found for: CHOL, CHOLPOCT, CHOLX, CHLST, CHOLV, 268841, HDL, HDLP, LDL, LDLC, DLDLP, 318610, VLDLC, VLDL, TGLX, TRIGL, TRIGP, TGLPOCT, CHHD, CHHDX  
BNP No results for input(s): BNPP in the last 72 hours. Liver Enzymes No results for input(s): TP, ALB, TBIL, AP in the last 72 hours. No lab exists for component: SGOT, GPT, DBIL Thyroid Studies No results found for: T4, T3U, TSH, TSHEXT, TSHEXT Procedures/imaging: see electronic medical records for all procedures/Xrays and details which were not copied into this note but were reviewed prior to creation of Plan

## 2020-10-06 NOTE — PROGRESS NOTES
Occupational Therapy Discharge Chart reviewed. Attempted Occupational Therapy Treatment, however, patient continues to have waxing/waning arousal and confusion. Pt has not made any progress with ADLs during tx yesterday. Pt unable to tolerate MBSS. Without a nutritional source, it's contraindicated to continue OT services. Recommend comfort-driven care. Will sign off. Thank you for allowing me to assist in the care of this patient.  
Milagros Corona, OTR/L, CSRS, CDCS, LOOKSIMA

## 2020-10-06 NOTE — PROGRESS NOTES
Speech-Language Pathology At 1000 patient in radiology to attempt MBS. Patient not alerting appropriately to safely complete study. Not verbalizing, but swatting and pinching at staff during repositioning, pulling and tugging at all lines. Patient o2 saturation at 97%, however breath sounds are coarse and crackling. Patient remains inappropriate to attempt MBS, and not safe for PO feeding. Findings relayed to patient's nurse, Holland Lacey, Care management, as well as 3n staff during interdisciplinary rounds. Dr. Mone Blair was paged to inform incompletion of study. Care management to coordinate with Dr. Mone Blair to pursue palliative/comfort at this time, as patient is DNR/DNI, and is not safe to attempt alternative means of nutrition. Thank you. ST f/u as patient able. MILTON Field.Ed, [id number] [facility]

## 2020-10-06 NOTE — DIABETES MGMT
GLYCEMIC CONTROL PROGRESS NOTE: 
 
- discussed in rounds, no known h/o DM 
- Solumedrol 20 mg Q 12 hours - BG in target range non-ICU: < 180 mg/dL - Lantus held yesterday r/t NPO status no coverage needed recommend monitor BG trends and make adjustments as needed Recent Glucose Results:  
Lab Results Component Value Date/Time  (H) 10/06/2020 03:18 AM  
 GLUCPOC 127 (H) 10/06/2020 05:57 AM  
 GLUCPOC 112 (H) 10/05/2020 11:38 PM  
 GLUCPOC 122 (H) 10/05/2020 09:20 PM  
 
Jennifer Conn MS, RN, CDE Glycemic Control Team 
[phone] Pager [phone] (M-TH 8:00-4:30P) *After Hours pager [phone]

## 2020-10-06 NOTE — PROGRESS NOTES
0725 Bedside and Verbal shift change report given to KEYANNA Veronica, RN (oncoming nurse) by Vladimir Woody. Craig Roman RN (offgoing nurse). Report included the following information SBAR, Kardex, Intake/Output, MAR, and Recent Results. Pt in bed, call light in reach. 0855 Pt assessed. Pt has labored breathing. 1255 Pt assessed. Pt having labored breaths [facility] 7715 to pt's son Lory Carmona. Gave update regarding pt not having swallow study today. 1630 Pt assessed, pt has labored breaths. 1800 Spoke to pt's son, gave updates on the status of his father, Lory Carmona (son) stated he will try to call palliative tonight to talk about options. He sounds open to options to keep his father comfortable. 1930 Bedside and Verbal shift change report given to DANGELO Rockwell (oncoming nurse) by Jimenez Loco. Adriana Veronica RN (offgoing nurse). Report included the following information SBAR, Kardex, Intake/Output and MAR. Pt in bed, call light in reach.

## 2020-10-06 NOTE — PROGRESS NOTES
DC Plan: TBD pending care decisions, If hospice decided will need hospice order 
  
Chart reviewed. Pt admitted to tele. Palliative following. Pt was unable to tolerate MBS. Awaiting care decisions at this point. Per this CMs previous conversation with pts son he would prefer a home dc plan, rather than facility if possible. Local SNF referrals were placed in the event this changes. Pt does have neg covid. CM will cont to follow for transition of care needs [phone]

## 2020-10-06 NOTE — PROGRESS NOTES
Problem: Pressure Injury - Risk of 
Goal: *Prevention of pressure injury Description: Document Alfredo Scale and appropriate interventions in the flowsheet. Outcome: Progressing Towards Goal 
Note: Pressure Injury Interventions: 
Sensory Interventions: Assess changes in LOC Moisture Interventions: Absorbent underpads Activity Interventions: Pressure redistribution bed/mattress(bed type), PT/OT evaluation Mobility Interventions: HOB 30 degrees or less, PT/OT evaluation, Pressure redistribution bed/mattress (bed type) Nutrition Interventions: Document food/fluid/supplement intake Friction and Shear Interventions: HOB 30 degrees or less

## 2020-10-07 NOTE — ROUTINE PROCESS
Bedside and Verbal shift change report given to Luis Alfredo Sethi RN (oncoming nurse) by Pooja Beltran RN (offgoing nurse). Report included the following information SBAR and Kardex.

## 2020-10-07 NOTE — PROGRESS NOTES
5351 - Bedside shift report received from MELE Ca RN. Assumed care of patient. Patient noted resting in bed with eyes closed at this time. Respirations even and unlabored. Call light in reach. 1015 - Assessment completed as per flowsheet. Patient noted resting in bed with eyes closed. Respirations even and unlabored. On 3L of O2 via nasal cannula. Scattered bruising noted to BUE. Incontinent of bowel and bladder. Incontinent brief in place. 1110 - Call from Fidel Saunders NP stating that patient is on comfort measures and may have thickened liquids. 1530 - Patient with multiple attempts to get OOB by himself. Patient assisted back into bed each time. Bed alarm activated.

## 2020-10-07 NOTE — PROGRESS NOTES
Problem: Pressure Injury - Risk of 
Goal: *Prevention of pressure injury Description: Document Alfredo Scale and appropriate interventions in the flowsheet. Outcome: Progressing Towards Goal 
Note: Pressure Injury Interventions: 
Sensory Interventions: Assess changes in LOC, Check visual cues for pain, Monitor skin under medical devices, Pressure redistribution bed/mattress (bed type) Moisture Interventions: Absorbent underpads, Apply protective barrier, creams and emollients, Check for incontinence Q2 hours and as needed, Limit adult briefs, Maintain skin hydration (lotion/cream) Activity Interventions: Pressure redistribution bed/mattress(bed type) Mobility Interventions: Pressure redistribution bed/mattress (bed type) Nutrition Interventions: (PT NPO) Friction and Shear Interventions: Apply protective barrier, creams and emollients Problem: Falls - Risk of 
Goal: *Absence of Falls Description: Document Gabby Cavlillo Fall Risk and appropriate interventions in the flowsheet. Outcome: Progressing Towards Goal 
Note: Fall Risk Interventions: 
Mobility Interventions: Bed/chair exit alarm, Communicate number of staff needed for ambulation/transfer Mentation Interventions: Adequate sleep, hydration, pain control, Bed/chair exit alarm, Door open when patient unattended, Evaluate medications/consider consulting pharmacy, More frequent rounding, Room close to nurse's station, Toileting rounds, Update white board Medication Interventions: Evaluate medications/consider consulting pharmacy, Bed/chair exit alarm Elimination Interventions: Bed/chair exit alarm, Call light in reach History of Falls Interventions: Bed/chair exit alarm, Door open when patient unattended, Room close to nurse's station

## 2020-10-07 NOTE — PROGRESS NOTES
DC Plan: Hospice 
  
Chart reviewed. Pt admitted to tele.  Palliative following.  Pt previously was unable to tolerate MBS. Awaiting care decisions at this point. Per this CMs previous conversation with pts son he would prefer a home dc plan, rather than facility if possible.  Local SNF referrals were placed in the event this changes. Pt does have neg covid. CM will cont to follow for transition of care needs [phone] 
 
1150 Spoke with MD Felicita Fletcher, pts son has decided on hospice. LM with pts son to discuss dc plan. Referral placed with [facility]. Hospice order noted. Brain Leon Spoke with pts son Farhad Gaines, he confirms plan for [facility] dc tomorrow, pt will need hospital bed

## 2020-10-07 NOTE — PROGRESS NOTES
Palliative Medicine Consult [facility]: [phone] ([phone]) [facility]: [phone] [facility]: [phone] Patient Name: Sally Gross YOB: 1940 Date of Initial Consult: 9/29/2020; follow up: 10/7/2020 Reason for Consult: care decisions Requesting Provider: Stewart Guy MD 
Primary Care Physician: Evelin Hoover MD 
  
 SUMMARY:  
Salyl Gross is a 68 y.o. male with a past history of MI, CAD with stents, BRANDEN, COPD, chronic respiratory failure with home oxygen, traumatic brain injury and seizures who was admitted on 9/28/2020 from home with a diagnosis of exacerbation of COPD. Current medical issues leading to Palliative Medicine involvement include: COPD, dementia and goals of care. 9/30/2020: Palliative care team including INDIGO Mcmanus and this NP met with patient in his ICU room. He is awake and alert. He was engaged in conversation with the team and appears to answer simple questions appropriately. He was able to tell us family members names and locations. He stated he has some documents with his  but was unable to identify them. At that time, we instructed patient we would follow up with his family. 10/5/2020: Palliative care team including Cheikh Ivy RN and this NP met with patient at bedside. He easily awakens to voice. Patient is lethargic and falls asleep when being asked questions. 10/7/2020: This NP met with patient at bedside. PALLIATIVE DIAGNOSES:  
1. Advanced care decisions 2. Exacerbation of COPD 3. Acute on chronic respiratory failure 4. Altered mental status PLAN:  
 
10/7/2020: Telephone call placed to patient's son, Sanju Taylor to discuss goals of care. Sanju Taylor fixated on his father not eating or drinking and his \"tongue being chapped\". Sanju Taylor feels his father could never complete the MBS because he is too dry to swallow.  Patient does have IV fluids being administered. Discussed the end goal with him. Pointed out that as we talked before his father would never tolerate a feeding tube. The MBS could provide info that he needs a feeding tube but would not change anything. Recommendations would be to allow his father comfort feeds knowing he is at risk for aspiration. He is aspirating own secretions. Also discussed comfort feeds as part of a total comfort package with medications to control symptoms and goal of him coming home with hospice to better control some of the behaviors he had developed prior to this hospitalization. Discussed that this is likely the patient's new baseline. Joseluis Swanson stated that he wishes to move to comfort measures now and start feedings while he is here as he cannot watch him starve. He also stated he would discuss with his brother Gerri Rojas. I also left message for Gerri Rojas to call me. POST form emailed to [email or web address] via [email or web address] for signature. Will allow patient to start diet with thickened liquids. Hospice consult placed. Dr. Lexii Pineda and RANDY Wall notified of change in goals of care. GOALS OF CARE: DNR/DNI, comfort measures only. ADDENDUM: Three way call this afternoon to discuss goals of care with [facility] and his significant other Qasim Ramos (who is LPN at [employer] [employer] [facility] [place] Discussed improvement of pneumonia per CXR and that patient is and always will be an aspiration risk. Discussed the use of medications to control symptoms in hospice care especially the increased work of breathing patient had prior to admission. All questions answered to their satisfaction and they will be in contact with hospice agency. See previous notes shown below:  
 
10/5/2020: Telephone call placed to patient's son, Raghu Lees. Explained that patient is more lethargic and remains confused today. He is concerned about the results of the MBS and whether the records from [place] have been forwarded yet.  We discussed the benefits and risks of the feeding tube including aspiration. Nunu Field agrees that knowing his father, he would not want any tubes in his body. I explained that the results of the MBS would only help us to determine whether his father needed a feeding tube. A feeding tube would not improve his COPD nor his significant CAD. In view of his advanced disease and ongoing mental status changes, suggested hospice services for discharge. Nunu Field stated he would like to visit with his father tonight and talk with his brother, Reji Sharma before making any further decisions. GOALS OF CARE: DNR/DNI  NO INTUBATION FOR RESPIRATORY DISTRESS. POST is on file with limited interventions. 9/30/2020: Telephone call placed to son, Nunu Field who the patient lives with. Patty Gonzalez stated he has a Durable POA that was done in [place], but unsure if it includes medical. He also stated he was his father's medical point of contact previously. Nunu Field is coming this afternoon to visit with his father and we will meet with them at that time to discuss AMD and goals of care. Follow up to this morning's call/visit, met with patient and his son, Shantanu Malik, in patient's room. Patient identified 4 children this morning: Francisco Rodney and Nunu Field. In meeting this afternoon, it was discovered that [address] [place] [address] are estranged from the family and neither the patient or Shantanu  or Reji Sharma have any idea how to get in touch with them. Reji Sharma was on the cell phone with Dele  for much of this meeting and is in agreement with plans as agreed to by Dele . Patient stated during our meeting that he would have [facility] as his MPOA. Jennifer Tavera is patient's  from time he lived in [place]. Patient lacks insight into the appropriateness of appointee for MPOA. It was agreed by all to wait for further mental clearing before completing documentation. OF NOTE: PATIENT'S SON STATES HIS FATHER DOES NOT DRINK ALCOHOL nor have a diagnosis of dementia.  Patient has had a traumatic brain injury decades ago and is on dilantin for seizures. He has been instructed to not drink. No change in GOALS OF CARE: DNR/DNI, NO INTUBATION FOR RESPIRATORY DISTRESS. 
 
9/29/2020 1. Advanced care decisions: Palliative care team including INDIGO Purdy and this NP met with patient at bedside. He is currently resting on HFNC in the ICU. Patient's son, Matt Landon, had just left after conversing with the team. Patient lives with Matt Landon and his wife. Per conversations with the admitting physician, Matt Landon is the [place]. Will need to obtain copies of the AMD. Matt Landon has also instructed the physicians regarding patient's code status. Will follow for improvement vs decline in health for additional conversations and to obtain POST. GOALS OF CARE: DNR/DNI, no intubation for respiratory distress. 2.  Exacerbation of COPD: presented with 2 day history of increasing shortness of breath refractory to rescue inhaler. IV antibiotics & steroids initiated, currently on HFNC. Primary team managing. 3.  Acute on chronic respiratory failure: on home oxygen of 2 LPM baseline. Refuses CPAP for BRANDEN and uses supplemental oxygen per concentrator. 4.  Alcohol abuse: per history daily beer drinker. CIWA scale observation. This information later clarified and is untrue. 5.  Dementia: per history complicated by severe agitation and combative behavior. This information later clarified and is untrue. 6.  Initial consult note routed to primary continuity provider 7. Communicated plan of care with: Palliative IDT 
 
GOALS OF CARE: 
Patient/Health Care Proxy Stated Goals: Comfort TREATMENT PREFERENCES:  
Code Status: DNR/DNI Advance Care Planning: No flowsheet data found. Medical Interventions: Comfort measures Artificially Administered Nutrition: No feeding tube Other: As far as possible, the palliative care team has discussed with patient/health care proxy about goals of care/treatment preferences for patient. HISTORY:  
 
History obtained from: chart CHIEF COMPLAINT: lethargic HPI/SUBJECTIVE: The patient is:  
[] Verbal and participatory [x] Non-participatory due to:  
lethargy Clinical Pain Assessment (nonverbal scale for nonverbal patients): Clinical Pain Assessment Severity: 0 Activity (Movement): Seeking attention through movement or slow, cautious movement Duration: for how long has pt been experiencing pain (e.g., 2 days, 1 month, years) Frequency: how often pain is an issue (e.g., several times per day, once every few days, constant) FUNCTIONAL ASSESSMENT:  
 
Palliative Performance Scale (PPS): PPS: 20 
 
ECOG 
ECOG Status : Completely disabled PSYCHOSOCIAL/SPIRITUAL SCREENING:  
  
Any spiritual / Congregation concerns: 
[] Yes /  [x] No 
 
Caregiver Burnout: 
[] Yes /  [] No /  [x] No Caregiver Present Anticipatory grief assessment:  
[x] Normal  / [] Maladaptive REVIEW OF SYSTEMS:  
 
Positive and pertinent negative findings in ROS are noted above in HPI. The following systems were [] reviewed / [x] unable to be reviewed as noted in HPI Other findings are noted below. Systems: constitutional, ears/nose/mouth/throat, respiratory, gastrointestinal, genitourinary, musculoskeletal, integumentary, neurologic, psychiatric, endocrine. Positive findings noted below. Modified ESAS Completed by: provider Pain: 0 Nausea: 0 Dyspnea: 0 PHYSICAL EXAM:  
 
Wt Readings from Last 3 Encounters:  
10/05/20 69.8 kg (153 lb 12.8 oz) Blood pressure (!) 144/71, pulse 97, temperature 97.7 °F (36.5 °C), resp. rate 24, height 5' 8\" (1.727 m), weight 69.8 kg (153 lb 12.8 oz), SpO2 98 %. Pain: 
Pain Scale 1: FLACC Pain Intensity 1: 0 Pain Location 1: Generalized, Shoulder Pain Orientation 1: Right, Left Constitutional: Lethargic, elderly male, lying in bed in no apparent distress.   
Eyes: anicteric, pupil equal 
 Respiratory: breathing mildly labored with some accessory muscle use, oxygen per nasal cannula. Musculoskeletal: no deformity Skin: warm, dry Neurological: Following commands, moving all extremities, confused. HISTORY:  
 
Principal Problem: 
  Chronic obstructive pulmonary disease with acute exacerbation ([address] (9/28/2020) Active Problems: 
  Tobacco dependence (9/28/2020) Supplemental oxygen dependent (9/29/2020) Hypoxia (9/29/2020) CAD (coronary artery disease) (9/29/2020) Combative behavior (9/29/2020) Alcohol abuse (9/29/2020) Dementia ([facility] (9/29/2020) BRANDEN (obstructive sleep apnea) (9/29/2020) Acute respiratory distress (9/29/2020) Acute respiratory failure with hypoxia and hypercapnia ([facility] (9/29/2020) Altered mental status (9/29/2020) Dysphagia (10/4/2020) Past Medical History:  
Diagnosis Date  CAD (coronary artery disease)  Chronic obstructive pulmonary disease (HCC)   
 on 2 liters at home  Dementia ([facility]  BRANDEN (obstructive sleep apnea) Past Surgical History:  
Procedure Laterality Date  CARDIAC SURG PROCEDURE UNLIST    
 stents x6 Family History Problem Relation Age of Onset  Heart Disease Father History reviewed, no pertinent family history. Social History Tobacco Use  Smoking status: Current Every Day Smoker Packs/day: 2.00  Smokeless tobacco: Never Used Substance Use Topics  Alcohol use: Yes Alcohol/week: 7.0 standard drinks Types: 7 Cans of beer per week No Known Allergies Current Facility-Administered Medications Medication Dose Route Frequency  LORazepam (INTENSOL) 2 mg/mL oral concentrate 0.5 mg  0.5 mg SubLINGual Q1H PRN  
 morphine (ROXANOL) concentrated oral syringe 5 mg  5 mg SubLINGual Q1H PRN  
 0.45% sodium chloride infusion  100 mL/hr IntraVENous CONTINUOUS  
 pantoprazole (PROTONIX) 40 mg in 0.9% sodium chloride 10 mL injection 40 mg IntraVENous Q24H  
 albuterol (PROVENTIL VENTOLIN) nebulizer solution 2.5 mg  2.5 mg Nebulization Q4H PRN  
 ipratropium (ATROVENT) 0.02 % nebulizer solution 0.5 mg  0.5 mg Nebulization Q4H PRN  
 [Held by provider] QUEtiapine (SEROquel) tablet 25 mg  25 mg Oral QHS  albuterol-ipratropium (DUO-NEB) 2.5 MG-0.5 MG/3 ML  3 mL Nebulization QID RT  
 budesonide (PULMICORT) 500 mcg/2 ml nebulizer suspension  500 mcg Nebulization BID RT  
 phenytoin (DILANTIN) injection 100 mg  100 mg IntraVENous BID  sodium chloride (NS) flush 5-40 mL  5-40 mL IntraVENous Q8H  
 acetaminophen (TYLENOL) tablet 650 mg  650 mg Oral Q6H PRN Or  
 acetaminophen (TYLENOL) suppository 650 mg  650 mg Rectal Q6H PRN  polyethylene glycol (MIRALAX) packet 17 g  17 g Oral DAILY PRN  promethazine (PHENERGAN) tablet 12.5 mg  12.5 mg Oral Q6H PRN Or  
 ondansetron (ZOFRAN) injection 4 mg  4 mg IntraVENous Q6H PRN  
 clopidogreL (PLAVIX) tablet 75 mg  75 mg Oral DAILY  [Held by provider] tamsulosin (FLOMAX) capsule 0.4 mg  0.4 mg Oral DAILY  diphenhydrAMINE (BENADRYL) injection 12.5 mg  12.5 mg IntraVENous Q6H PRN  
 LORazepam (ATIVAN) injection 1 mg  1 mg IntraVENous Q1H PRN  
 nicotine (NICODERM CQ) 21 mg/24 hr patch 1 Patch  1 Patch TransDERmal DAILY  haloperidol lactate (HALDOL) injection 3 mg  3 mg IntraVENous Q4H PRN  
 0.9% sodium chloride 1,000 mL with mvi, adult no. 4 with vit K 10 mL, thiamine 546 mg, folic acid 1 mg infusion   IntraVENous DAILY  
 
 
 LAB AND IMAGING FINDINGS:  
 
Lab Results Component Value Date/Time WBC 13.3 (H) 10/07/2020 03:00 AM  
 HGB 12.5 (L) 10/07/2020 03:00 AM  
 PLATELET 198 19/84/1002 03:00 AM  
 
Lab Results Component Value Date/Time  Sodium 153 (H) 10/07/2020 03:00 AM  
 Potassium 3.4 (L) 10/07/2020 03:00 AM  
 Chloride 121 (H) 10/07/2020 03:00 AM  
 CO2 27 10/07/2020 03:00 AM  
 BUN 21 (H) 10/07/2020 03:00 AM  
 Creatinine 0.65 10/07/2020 03:00 AM  
 Calcium 8.7 10/07/2020 03:00 AM  
  
Lab Results Component Value Date/Time Alk. phosphatase 60 10/02/2020 04:35 AM  
 Protein, total 7.7 10/02/2020 04:35 AM  
 Albumin 5.0 10/02/2020 04:35 AM  
 Globulin 2.7 10/02/2020 04:35 AM  
 
Lab Results Component Value Date/Time INR 1.0 09/28/2020 09:02 PM  
 Prothrombin time 13.5 09/28/2020 09:02 PM  
 aPTT 33.0 09/28/2020 09:02 PM  
  
No results found for: IRON, FE, TIBC, IBCT, PSAT, FERR No results found for: PH, PCO2, PO2 No components found for: [place] Lab Results Component Value Date/Time  09/29/2020 03:04 PM  
 CK - MB 2.6 09/29/2020 03:04 PM  
  
 
   
 
Total time: 25 minutes Counseling / coordination time, spent as noted above > 50% counseling / coordination: 20 minutes with patient, family, RN Prolonged service was provided for  []30 min   []75 min in face to face time in the presence of the patient, spent as noted above. Time Start:  
Time End:  
Note: this can only be billed with 67403 (initial) or 35129 (follow up). If multiple start / stop times, list each separately.

## 2020-10-07 NOTE — PROGRESS NOTES
Hospitalist Progress Note Patient: Gamaliel Millan MRN: [id number]  CSN: [id number] YOB: 1940  Age: 68 y.o. Sex: male DOA: 9/28/2020 LOS:  LOS: 8 days Assessment/Plan Patient Active Problem List  
Diagnosis Code  Tobacco dependence F17.200  Chronic obstructive pulmonary disease with acute exacerbation (HCC) J44.1  Supplemental oxygen dependent Z99.81  
 Hypoxia R09.02  
 CAD (coronary artery disease) I25.10  Combative behavior R46.89  
 Alcohol abuse F10.10  Dementia ([address] F03.90  
 BRANDEN (obstructive sleep apnea) G47.33  
 Acute respiratory distress R06.03  
 Acute respiratory failure with hypoxia and hypercapnia (HCC) J96.01, J96.02  
 Altered mental status R41.82  Dysphagia R13.10  
  
 
 
 
69 y/o male with COPD (2L supplemental oxygen dependent), BRANDEN, CAD, alcohol abuse, and tobacco dependence is admitted with a COPD exacerbation, hypoxia. He required to be started on high flow oxygen and ICU admission. He is now on oxygen by nasal cannula, answering to questions. COVID 19 rapid test negative. Leukocytosis secondary to solumedrol, Sodium elevated, started on 1/2 NS, echo with EF of 55-60%. Still NPO for significant dysphagia and aspiration risk. Called and spoke with son on phone at length, discussed his hospitalization, especially dysphagia, he is choking on food prompting NPO. Will try MBS tomorrow. Discussed options of PEG tube vs continue oral intake with risk of aspiration vs hospice. Son now agrees to comfort measures only. Acute on chronic respiratory failure with hypoxia- 
Secondary to COPD exacerbation, now improved, he is on oxygen by nasal cannula. CTA chest negative for PE, bronchial wall thickening and possible basilar atelectasis versus hypoinflation. COPD exacerbation- 
continue with IV Solu-Medrol, duo nebs, Pulmicort. Continue with empiric antibiotics.  
 
Aspiration pneumonia - 
 Antibiotics, including anaerobic coverage. On zosyn Oxygen support. Head of the bed elevated to 30 degrees. Strict aspiration precautions Dysphagia- 
Seen by SLP, recommend n.p.o. MBS cancelled due to high risk of aspiration. CAD- No chest pain, history of stent placement. Echo with EF of 55 to 44%, grade 1 diastolic dysfunction. Medications on hold secondary to dysphagia and n.p.o. Alcohol use- 
Continue with CIWA protocol and banana bag. Tobacco abuse- 
Nicotine patch Hypernatremia - 
Started on 1/2 NS, follow sodium levels. Continue with Dilantin, change dose to IV. Elevated blood sugar secondary to steroid use, continue with sliding scale insulin. DVT prophylaxis with Lovenox, Poor prognosis, palliative care following. Son now agrees on comfort measures. Disposition : TBD Review of systems General: No fevers or chills. Cardiovascular: No chest pain or pressure. No palpitations. Pulmonary: see above. Gastrointestinal: No nausea, vomiting. Physical Exam: 
General: Awake, cooperative, no acute distress   
HEENT: NC, Atraumatic. PERRLA, anicteric sclerae. Lungs: CTA Bilaterally. No Wheezing/Rhonchi/Rales. Heart:  S1 S2,  No murmur, No Rubs, No Gallops Abdomen: Soft, Non distended, Non tender.  +Bowel sounds, Extremities: No c/c/e Psych:   Not anxious or agitated. Neurologic:  No acute neurological deficit. Vital signs/Intake and Output: 
Visit Vitals BP (!) 144/71 Pulse 97 Temp 97.7 °F (36.5 °C) Resp 24 Ht 5' 8\" (1.727 m) Wt 69.8 kg (153 lb 12.8 oz) SpO2 99% BMI 23.39 kg/m² Current Shift:  No intake/output data recorded. Last three shifts:  10/05 1901 - 10/07 0700 In: 478.3 [I.V.:478.3] Out: -  
 
 
 
 
 
Labs: Results:  
   
Chemistry Recent Labs 10/07/20 
0300 10/06/20 
0318 10/05/20 
0100 * 139* 94 * 153* 152* K 3.4* 3.7 3.5 * 120* 116* CO2 27 28 28 BUN 21* 31* 35* CREA 0.65 0.74 0.78  
CA 8.7 9.1 9.5 AGAP 5 5 8 BUCR 32* 42* 45* CBC w/Diff Recent Labs 10/07/20 
0300 10/06/20 
0318 10/05/20 
0100 WBC 13.3* 15.0* 15.2*  
RBC 4.20* 4.40* 4.65* HGB 12.5* 13.2 14.1 HCT 41.1 43.0 45.4  319 334 GRANS 87* 88* 89* LYMPH 8* 8* 6*  
EOS 0 0 0 Cardiac Enzymes No results for input(s): CPK, CKND1, SAMMY in the last 72 hours. No lab exists for component: [place] Coagulation No results for input(s): PTP, INR, APTT, INREXT, INREXT in the last 72 hours. Lipid Panel No results found for: CHOL, CHOLPOCT, CHOLX, CHLST, CHOLV, 332868, HDL, HDLP, LDL, LDLC, DLDLP, 009360, VLDLC, VLDL, TGLX, TRIGL, TRIGP, TGLPOCT, CHHD, CHHDX  
BNP No results for input(s): BNPP in the last 72 hours. Liver Enzymes No results for input(s): TP, ALB, TBIL, AP in the last 72 hours. No lab exists for component: SGOT, GPT, DBIL Thyroid Studies No results found for: T4, T3U, TSH, TSHEXT, TSHEXT Procedures/imaging: see electronic medical records for all procedures/Xrays and details which were not copied into this note but were reviewed prior to creation of Plan

## 2020-10-07 NOTE — PROGRESS NOTES
2347-In bed, resting quietly. Bed alarm activated. 0020-Patient repositioned in bed and changed for incontinence. Assessment complete.

## 2020-10-07 NOTE — PROGRESS NOTES
NUTRITION update ASSESSMENT/PLAN:  
 
Current Diet: Full Liquid w/ honey thick for comfort feeds per order Chart reviewed, note change in goals of care now focused on comfort measures only. Aggressive nutrition intervention is not indicated at this time. Will assist as needed; please consult if nutrition intervention is medically indicated and consistent with patient's goals of care. Dilip Mayo RD Pager:  [phone]

## 2020-10-07 NOTE — ROUTINE PROCESS
2347-Resting in bed. Call light within reach. Bed alarm activated. 0020-Resting in bed, eye closed, arouses easily, confused. Restless after aroused, calms slightly with communication. Tele-box in place and leads are placed away from patient reach posteriorly. Will continue to monitor. Changed for incontinence of bladder. 0102-Resting in bed, eyes closed, arouses. Calm at this time. 0519-Calm, resting quietly. Bed alarm activated. 0600-Restless, constantly attempting to get out of bed, redirected easily at times; pulling at oxygen tubing and attempting to get out of bed. 
 
0630-Medicated for restlessness and agitation. Shift Summary:  Uneventful shift. Stable at shift change report.

## 2020-10-07 NOTE — PROGRESS NOTES
1930 Pt received from offgoing nurse without any signs or symptoms of distress. Pt vitals are stable and within normal limits. Pt bed in low position with wheels locked and call bell within reach. 2007 Assessment completed and documented in flow sheet. Pt denies any further needs at this time. Pt in NAD with bed in low position, wheels locked and call bell within reach. 2230 Scheduled medications administered as ordered. Haldol administered per PRN order for increased agitation. 2330 Bedside and Verbal shift change report given to Chon Wilson RN (oncoming nurse) by Pia Staley RN (offgoing nurse). Report included the following information SBAR, Intake/Output, MAR and Recent Results.

## 2020-10-07 NOTE — HOSPICE
[address] 36 to [facility] to follow up previous hospice discussion. States he is aware that nothing else can be done to make his dad better and wishes for him to come home. Advised he was going to give his S/O Erroll Rogelio this writers number to call to discuss hospice and DME needs. Spoke with [facility], pt's son via phone Discussed [facility] philosophy, services, criteria, and IDT. Discussed caregiver need for round the clock care with [facility] 
primary caregiver identified as [facility] and his wife Caregiver concerns identified as The mahesh wants to f/u with palliative before deciding on hospice. States they are working on getting him to eat to see if he can regain some of his stregnth. Advised with hospice pt could have comfort feeds. Answered all questions. Discussed hospice at home vs a facility and advised pt/family would have to pay privately for room and board at a facility. States he wants him to come home. He advised his wife is an LPN will be providing his care. Provided with 24/7 contact information. Thank you for the referral to [facility]. If we can be of further assistance please contact [phone]. Hospice referral received. Chart review in process. Thank you for the referral to [facility]. If we can be of further assistance please contact [phone]. Negrita White RN [place] [address] [place], [facility] [address] 
[phone] Email: [email or web address]

## 2020-10-08 NOTE — PROGRESS NOTES
Chart reviewed and spoke with son regarding discharge planning, son plans for pt to return back home using [facility], at this time planning for tomorrow afternoon,cm spoke with [address] home equipment will be ordered today including hospital bed and home 02, aware of discharge plan and agreeable, cm will finalize case tomorrow and remain available, cm contact number if needed x [phone].

## 2020-10-08 NOTE — PROGRESS NOTES
1949 - Assumed care at this time. Pt A&Ox1, 3L NC. Respirations even, labored breathing due to pt attempting to get OOB. Pt denies pain. Lung sounds clear/diminished. Pt on bed alarm, door left open. Will continue to monitor.

## 2020-10-08 NOTE — ROUTINE PROCESS
Bedside and Verbal shift change report given to MAGUE Corona RN by Belle Bansal RN. Report included the following information SBAR, Kardex, OR Summary, Intake/Output and MAR.

## 2020-10-08 NOTE — PROGRESS NOTES
Problem: Pressure Injury - Risk of 
Goal: *Prevention of pressure injury Description: Document Alfredo Scale and appropriate interventions in the flowsheet. Outcome: Progressing Towards Goal 
Note: Pressure Injury Interventions: 
Sensory Interventions: Pressure redistribution bed/mattress (bed type) Moisture Interventions: Absorbent underpads, Limit adult briefs Activity Interventions: Pressure redistribution bed/mattress(bed type) Mobility Interventions: Pressure redistribution bed/mattress (bed type) Nutrition Interventions: Document food/fluid/supplement intake Friction and Shear Interventions: HOB 30 degrees or less Problem: Patient Education: Go to Patient Education Activity Goal: Patient/Family Education Outcome: Progressing Towards Goal 
  
Problem: Falls - Risk of 
Goal: *Absence of Falls Description: Document Diane Galeasccasin Fall Risk and appropriate interventions in the flowsheet. Outcome: Progressing Towards Goal 
Note: Fall Risk Interventions: 
Mobility Interventions: Patient to call before getting OOB, Utilize walker, cane, or other assistive device Mentation Interventions: Bed/chair exit alarm, Door open when patient unattended, More frequent rounding, Reorient patient Medication Interventions: Patient to call before getting OOB, Teach patient to arise slowly Elimination Interventions: Call light in reach, Patient to call for help with toileting needs, Urinal in reach History of Falls Interventions: Bed/chair exit alarm, Door open when patient unattended, Room close to nurse's station Problem: Patient Education: Go to Patient Education Activity Goal: Patient/Family Education Outcome: Progressing Towards Goal 
  
Problem: Patient Education: Go to Patient Education Activity Goal: Patient/Family Education Outcome: Progressing Towards Goal 
  
Problem: Patient Education: Go to Patient Education Activity Goal: Patient/Family Education Outcome: Progressing Towards Goal 
  
Problem: Patient Education: Go to Patient Education Activity Goal: Patient/Family Education Outcome: Progressing Towards Goal 
  
Problem: Chronic Obstructive Pulmonary Disease (COPD) Goal: *Oxygen saturation during activity within specified parameters Outcome: Progressing Towards Goal 
Goal: *Able to remain out of bed as prescribed Outcome: Progressing Towards Goal 
Goal: *Absence of hypoxia Outcome: Progressing Towards Goal 
Goal: *Optimize nutritional status Outcome: Progressing Towards Goal 
  
Problem: Patient Education: Go to Patient Education Activity Goal: Patient/Family Education Outcome: Progressing Towards Goal 
  
Problem: Tissue Perfusion - Cardiopulmonary, Altered Goal: *Optimize tissue perfusion Outcome: Progressing Towards Goal 
Goal: *Absence of hypoxia Outcome: Progressing Towards Goal 
  
Problem: Patient Education: Go to Patient Education Activity Goal: Patient/Family Education Outcome: Progressing Towards Goal 
  
Problem: Diabetes Self-Management Goal: *Disease process and treatment process Description: Define diabetes and identify own type of diabetes; list 3 options for treating diabetes. Outcome: Progressing Towards Goal 
Goal: *Incorporating nutritional management into lifestyle Description: Describe effect of type, amount and timing of food on blood glucose; list 3 methods for planning meals. Outcome: Progressing Towards Goal 
Goal: *Incorporating physical activity into lifestyle Description: State effect of exercise on blood glucose levels. Outcome: Progressing Towards Goal 
Goal: *Developing strategies to promote health/change behavior Description: Define the ABC's of diabetes; identify appropriate screenings, schedule and personal plan for screenings. Outcome: Progressing Towards Goal 
Goal: *Using medications safely Description: State effect of diabetes medications on diabetes; name diabetes medication taking, action and side effects. Outcome: Progressing Towards Goal 
Goal: *Monitoring blood glucose, interpreting and using results Description: Identify recommended blood glucose targets  and personal targets. Outcome: Progressing Towards Goal 
Goal: *Prevention, detection, treatment of acute complications Description: List symptoms of hyper- and hypoglycemia; describe how to treat low blood sugar and actions for lowering  high blood glucose level. Outcome: Progressing Towards Goal 
Goal: *Prevention, detection and treatment of chronic complications Description: Define the natural course of diabetes and describe the relationship of blood glucose levels to long term complications of diabetes. Outcome: Progressing Towards Goal 
Goal: *Developing strategies to address psychosocial issues Description: Describe feelings about living with diabetes; identify support needed and support network Outcome: Progressing Towards Goal 
Goal: *Insulin pump training Outcome: Progressing Towards Goal 
Goal: *Sick day guidelines Outcome: Progressing Towards Goal 
Goal: *Patient Specific Goal (EDIT GOAL, INSERT TEXT) Outcome: Progressing Towards Goal 
  
Problem: Patient Education: Go to Patient Education Activity Goal: Patient/Family Education Outcome: Progressing Towards Goal

## 2020-10-08 NOTE — ROUTINE PROCESS
Bedside and Verbal shift change report given to QASIM Reyna RN (oncoming nurse) by BELL Lynn RN (offgoing nurse). Report included the following information SBAR, Kardex, ED Summary, Intake/Output, MAR and Recent Results.

## 2020-10-08 NOTE — HOSPICE
Pt/family pursuing hospice:yes Admission dx approved by Hospice Medical Director: chronic respiratory failure. Conditions related to hospice diagnosis and contributing to terminal prognosis-COPD, coronary artery disease, obstructive sleep apnea, dementia, dysphagia. Anticipated hospital d/c date:10/9/20 Discussion occurred with patient and/or family about preference of hospitalization once admitted to hospice: yes Reviewed and discussed hospice philosophy and keeping the patient comfortable in their residence: yes (Document additional information below) Discussion with patient/family regarding around the clock caregiver in place due to patient safety in the home once discharged: yes Identified caregiver: (Document specifics discussed and/or any caregiver concerns identified). Narrative of events and/or assessment if applicable: [facility] bed w/full rails, gel overlay, over the bed table, 10L oxygen concentrator  to be delivered today btw 5-8pm  conf# [id number]. Transportation requested for 9-10am.  Will update when concern. Shari Dowd RN [place] [address] [place], [facility] [address] 
[phone] Email: [email or web address]

## 2020-10-08 NOTE — PROGRESS NOTES
Hospitalist Progress Note Patient: Gamaliel Millan MRN: [id number]  CSN: [id number] YOB: 1940  Age: 68 y.o. Sex: male DOA: 9/28/2020 LOS:  LOS: 9 days Assessment/Plan Patient Active Problem List  
Diagnosis Code  Tobacco dependence F17.200  Chronic obstructive pulmonary disease with acute exacerbation (HCC) J44.1  Supplemental oxygen dependent Z99.81  
 Hypoxia R09.02  
 CAD (coronary artery disease) I25.10  Combative behavior R46.89  
 Alcohol abuse F10.10  Dementia ([address] F03.90  
 BRANDEN (obstructive sleep apnea) G47.33  
 Acute respiratory distress R06.03  
 Acute respiratory failure with hypoxia and hypercapnia (HCC) J96.01, J96.02  
 Altered mental status R41.82  Dysphagia R13.10  
  
 
 
 
69 y/o male with COPD (2L supplemental oxygen dependent), BRANDEN, CAD, alcohol abuse, and tobacco dependence is admitted with a COPD exacerbation, hypoxia. He required to be started on high flow oxygen and ICU admission. He is now on oxygen by nasal cannula, answering to questions. COVID 19 rapid test negative. Leukocytosis secondary to solumedrol, Sodium elevated, started on 1/2 NS, echo with EF of 55-60%. Still NPO for significant dysphagia and aspiration risk. Called and spoke with son on phone at length, discussed his hospitalization, especially dysphagia, he is choking on food prompting NPO. Will try MBS tomorrow. Discussed options of PEG tube vs continue oral intake with risk of aspiration vs hospice. Son now agrees to comfort measures only. Acute on chronic respiratory failure with hypoxia- 
Secondary to COPD exacerbation, now improved, he is on oxygen by nasal cannula. CTA chest negative for PE, bronchial wall thickening and possible basilar atelectasis versus hypoinflation. COPD exacerbation- 
continue with IV Solu-Medrol, duo nebs, Pulmicort. Continue with empiric antibiotics.  
 
Aspiration pneumonia - 
 Antibiotics, including anaerobic coverage. On zosyn Oxygen support. Head of the bed elevated to 30 degrees. Strict aspiration precautions Dysphagia- 
Seen by SLP, recommend n.p.o. MBS cancelled due to high risk of aspiration. CAD- No chest pain, history of stent placement. Echo with EF of 55 to 17%, grade 1 diastolic dysfunction. Medications on hold secondary to dysphagia and n.p.o. Alcohol use- 
Continue with CIWA protocol and banana bag. Tobacco abuse- 
Nicotine patch Hypernatremia - 
Started on 1/2 NS, follow sodium levels. Continue with Dilantin, change dose to IV. Elevated blood sugar secondary to steroid use, continue with sliding scale insulin. DVT prophylaxis with Lovenox, Poor prognosis, palliative care following. Son now agrees on comfort measures. Plan to discharge home with hospice Disposition : awaiting hospice arrangements. Review of systems General: No fevers or chills. Cardiovascular: No chest pain or pressure. No palpitations. Pulmonary: see above. Gastrointestinal: No nausea, vomiting. Physical Exam: 
General: Awake, cooperative, no acute distress   
HEENT: NC, Atraumatic. PERRLA, anicteric sclerae. Lungs: CTA Bilaterally. No Wheezing/Rhonchi/Rales. Heart:  S1 S2,  No murmur, No Rubs, No Gallops Abdomen: Soft, Non distended, Non tender.  +Bowel sounds, Extremities: No c/c/e Psych:   Not anxious or agitated. Neurologic:  No acute neurological deficit. Vital signs/Intake and Output: 
Visit Vitals BP (!) 118/58 Pulse 85 Temp 97.9 °F (36.6 °C) Resp 22 Ht 5' 8\" (1.727 m) Wt 69.8 kg (153 lb 12.8 oz) SpO2 100% BMI 23.39 kg/m² Current Shift:  10/08 0701 - 10/08 1900 In: 480 [P.O.:480] Out: - Last three shifts:  10/06 1901 - 10/08 0700 In: 958.3 [P.O.:480; I.V.:478.3] Out: -  
 
 
 
 
 
Labs: Results:  
   
Chemistry Recent Labs 10/07/20 
0300 10/06/20 
8319 * 139* * 153* K 3.4* 3.7 * 120* CO2 27 28 BUN 21* 31* CREA 0.65 0.74 CA 8.7 9.1 AGAP 5 5 BUCR 32* 42* CBC w/Diff Recent Labs 10/07/20 
0300 10/06/20 
9085 WBC 13.3* 15.0*  
RBC 4.20* 4.40* HGB 12.5* 13.2 HCT 41.1 43.0  319 GRANS 87* 88* LYMPH 8* 8*  
EOS 0 0 Cardiac Enzymes No results for input(s): CPK, CKND1, SAMMY in the last 72 hours. No lab exists for component: [place] Coagulation No results for input(s): PTP, INR, APTT, INREXT, INREXT in the last 72 hours. Lipid Panel No results found for: CHOL, CHOLPOCT, CHOLX, CHLST, CHOLV, 199620, HDL, HDLP, LDL, LDLC, DLDLP, 343090, VLDLC, VLDL, TGLX, TRIGL, TRIGP, TGLPOCT, CHHD, CHHDX  
BNP No results for input(s): BNPP in the last 72 hours. Liver Enzymes No results for input(s): TP, ALB, TBIL, AP in the last 72 hours. No lab exists for component: SGOT, GPT, DBIL Thyroid Studies No results found for: T4, T3U, TSH, TSHEXT, TSHEXT Procedures/imaging: see electronic medical records for all procedures/Xrays and details which were not copied into this note but were reviewed prior to creation of Plan

## 2020-10-08 NOTE — ROUTINE PROCESS
Bedside and Verbal shift change report given to Neal Enriquez (oncoming nurse) by Carolina Winter RN (offgoing nurse). Report included the following information SBAR, Kardex, MAR and Recent Results.

## 2020-10-08 NOTE — PROGRESS NOTES
[facility] care at this time 0935-Assessment complete at this time. 1220-Reassessment complete 1530-Reassessment complete at this time. Shift Summary- 
Pt is alert and oriented x1. Pt had uneventful shift. Pt is incontinent of bowl and urine. Pt refuses to take capsule medications.  Pt is on 3L NC.

## 2020-10-09 NOTE — PROGRESS NOTES
[address] [facility] [address] care at this time. 1950 - Pt A&Ox1, 3L NC. Respirations even and unlabored. Pt denies pain. Lung sounds clear/diminished. Pt on bed alarm, door left open. Will continue to monitor.

## 2020-10-09 NOTE — PALLIATIVE CARE
Telephone call placed to patient's son, Farhad Gaines. The POST form has not been returned sign. He stated that [employer] wouldn't let him open the document. POST form resent and asked him to return now.

## 2020-10-09 NOTE — PROGRESS NOTES
Assumed care of pt from [address]. Pt alert no sign of distress.  
Pt transport set up for 12pm.

## 2020-10-09 NOTE — PROGRESS NOTES
Spoke with [address], medical equipment did not get delivered as planned yesterday due to son's request/schedule, equipment planned for 10 am delivery today, unit informed to please schedule medical transportation after 10 am possibly no later than 12N.

## 2020-10-09 NOTE — DISCHARGE SUMMARY
Discharge Summary Patient: Dante Pan MRN: [id number]  CSN: [id number] YOB: 1940  Age: 68 y.o. Sex: male DOA: 9/28/2020 LOS:  LOS: 10 days   Discharge Date:   
 
Primary Care Provider:  Other, MD Evelin 
 
Admission Diagnoses: COPD exacerbation ([facility] [J44.1] Acute respiratory distress [R06.03] Discharge Diagnoses:   
Problem List as of 10/9/2020 Date Reviewed: 9/29/2020 Codes Class Noted - Resolved Dysphagia ICD-10-CM: R13.10 ICD-9-CM: 787.20  10/4/2020 - Present Supplemental oxygen dependent ICD-10-CM: Z99.81 ICD-9-CM: V46.2  9/29/2020 - Present Hypoxia ICD-10-CM: R09.02 
ICD-9-CM: 799.02  9/29/2020 - Present CAD (coronary artery disease) ICD-10-CM: I25.10 ICD-9-CM: 414.00  9/29/2020 - Present Combative behavior ICD-10-CM: R46.89 
ICD-9-CM: 780.99  9/29/2020 - Present Alcohol abuse ICD-10-CM: F10.10 ICD-9-CM: 305.00  9/29/2020 - Present Dementia ([facility] ICD-10-CM: F03.90 ICD-9-CM: 294.20  9/29/2020 - Present BRANDEN (obstructive sleep apnea) ICD-10-CM: Z55.23 
ICD-9-CM: 327.23  9/29/2020 - Present Acute respiratory distress ICD-10-CM: R06.03 
ICD-9-CM: 518.82  9/29/2020 - Present Acute respiratory failure with hypoxia and hypercapnia (HCC) ICD-10-CM: J96.01, J96.02 
ICD-9-CM: 518.81  9/29/2020 - Present Altered mental status ICD-10-CM: R41.82 
ICD-9-CM: 780.97  9/29/2020 - Present Tobacco dependence ICD-10-CM: A09.818 ICD-9-CM: 305.1  9/28/2020 - Present * (Principal) Chronic obstructive pulmonary disease with acute exacerbation (HCC) ICD-10-CM: J44.1 ICD-9-CM: 491.21  9/28/2020 - Present Discharge Medications:    
Current Discharge Medication List  
  
CONTINUE these medications which have NOT CHANGED Details  
clopidogreL (Plavix) 75 mg tab Take 75 mg by mouth daily. Indications: stent placement  
  
tamsulosin (Flomax) 0.4 mg capsule Take 0.4 mg by mouth.  Indications: enlarged prostate with urination problem  
  
phenytoin ER (Dilantin Extended) 100 mg ER capsule Take 100 mg by mouth two (2) times a day. Indications: epilepsy STOP taking these medications  
  
 potassium chloride (KLOR-CON) 10 mEq tablet Comments:  
Reason for Stopping:   
   
 doxycycline (ADOXA) 100 mg tablet Comments:  
Reason for Stopping:   
   
 furosemide (Lasix) 40 mg tablet Comments:  
Reason for Stopping:   
   
  
 
 
Discharge Condition: Poor Procedures : none Consults: Pulmonary/Critical Care PHYSICAL EXAM  
Visit Vitals BP (!) 147/83 (BP 1 Location: Right arm, BP Patient Position: At rest) Pulse 88 Temp 97.5 °F (36.4 °C) Resp 20 Ht 5' 8\" (1.727 m) Wt 72.5 kg (159 lb 14.4 oz) SpO2 100% BMI 24.31 kg/m² General: Awake, cooperative, no acute distress, demented. HEENT: NC, Atraumatic. PERRLA, EOMI. Anicteric sclerae. Lungs:  CTA Bilaterally. No Wheezing/Rhonchi/Rales. Heart:  Regular  rhythm,  No murmur, No Rubs, No Gallops Abdomen: Soft, Non distended, Non tender. +Bowel sounds, Extremities: No c/c/e Psych:   Not anxious or agitated. Neurologic:  No acute neurological deficits. Admission HPI :  
Paul Bowers is a 68 y.o. male with COPD on 2L home oxygen, BRANDEN (on oxygen concentrator at night) and severe CAD with 6 stents placed in March 2020 who presents with worsening shortness of breath for 2 days that is refractory to his rescue inhaler. His son is at bedside and provides all of the history as the patient is tachypneic but also has dementia and can't provide any history. His son reports that he was living in [place] until last month when he moved in with him and his wife. He had a heart attack in March and was not a candidate for a CABG but 6 stents were placed.  He was hospitalized for an extended period of time which was complicated by severe agitation and very combative behavior that has continued since then. His son says that he does not do well in hospitals at all and is very difficult to sedate. He and his wife want to place him in a nursing home following this hospitalization because they can't take care of him in this state. He says that he urinates all over the house and is agitated and wakes up at night frequently. He currently smokes 2 packs a day. His son denies any known CHF but he is taking lasix.  
  
 
Hospital Course :  
Mr. Mario Petty was initially admitted to intensive care unit, due to his respiratory status he was started on high flow oxygen. He was seen and followed by pulmonary critical care. He was placed on Solu-Medrol and breathing treatment, once his breathing status improved he was transitioned to oxygen by nasal cannula. His respiratory status now improved. Severe oropharyngeal dysphagia- He was seen and followed by SLP. He had severe oropharyngeal dysphagia. Modified barium swallow was planned however given his significant oropharyngeal dysphasia it was canceled due to high risk of aspiration of barium. SLP recommended that he should be kept n.p.o. Called and spoke with son on phone at length, discussed his hospitalization, especially dysphagia, he is choking on food prompting NPO. Will try MBS tomorrow. Discussed options of PEG tube vs continue oral intake with risk of aspiration vs hospice. Son now agrees to comfort measures only. He is now getting comfort foods only. He will be discharged home with hospice. Activity: Activity as tolerated Diet: comfort foods Follow-up: home hospice Disposition: home hospice Minutes spent on discharge: 45 Labs: Results:  
   
Chemistry Recent Labs 10/07/20 
0300 * * K 3.4*  
* CO2 27 BUN 21* CREA 0.65 CA 8.7 AGAP 5  
BUCR 32* CBC w/Diff Recent Labs 10/07/20 
0300 WBC 13.3*  
RBC 4.20* HGB 12.5* HCT 41.1  GRANS 87* LYMPH 8*  
EOS 0  
  
 Cardiac Enzymes No results for input(s): CPK, CKND1, SAMMY in the last 72 hours. No lab exists for component: Hermelinda Quarto Coagulation No results for input(s): PTP, INR, APTT, INREXT in the last 72 hours. Lipid Panel No results found for: CHOL, CHOLPOCT, CHOLX, CHLST, CHOLV, 193992, HDL, HDLP, LDL, LDLC, DLDLP, 756845, VLDLC, VLDL, TGLX, TRIGL, TRIGP, TGLPOCT, CHHD, CHHDX  
BNP No results for input(s): BNPP in the last 72 hours. Liver Enzymes No results for input(s): TP, ALB, TBIL, AP in the last 72 hours. No lab exists for component: SGOT, GPT, DBIL Thyroid Studies No results found for: T4, T3U, TSH, TSHEXT Significant Diagnostic Studies: Ct Head Wo Cont Result Date: 9/30/2020 EXAM: CT head INDICATION: Headache. COMPARISON: None. TECHNIQUE: Axial CT imaging of the head was performed without intravenous contrast. Standard multiplanar coronal and sagittal reformatted images were obtained and are included in interpretation. One or more dose reduction techniques were used on this CT: automated exposure control, adjustment of the mAs and/or kVp according to patient size, and iterative reconstruction techniques. The specific techniques used on this CT exam have been documented in the patient's electronic medical record. Digital Imaging and Communications in Medicine (DICOM) format image data are available to nonaffiliated external healthcare facilities or entities on a secure, media free, reciprocally searchable basis with patient authorization for at least a 12-month period after this study. 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 Associated ex vacuo dilatation of bilateral lateral ventricle frontal horns. Cerebral atrophy. Patchy periventricular, deep and subcortical white matter hypoattenuation which is nonspecific but likely represents chronic ischemic changes.  No evidence of acute large vessel transcortical infarct or acute parenchymal hemorrhage. No midline shift or hydrocephalus. EXTRA-AXIAL SPACES AND MENINGES: There are no abnormal extra-axial fluid collections. CALVARIUM: Intact. SINUSES: Mild left frontal sinus opacification. OTHER: None. _______________ IMPRESSION: No acute intracranial abnormality. 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 Atrophy with chronic ischemic changes. Cta Chest W Or W Wo Cont Result Date: 9/29/2020 EXAM: CTA chest INDICATION: Respiratory distress. COMPARISON: None TECHNIQUE: Axial CT imaging from the thoracic inlet through the diaphragm with intravenous contrast. Coronal and sagittal MIP reformats were generated. Dose reduction techniques used: automated exposure control, adjustment of the mAs and/or kVp according to patient size, and iterative reconstruction techniques. Digital imaging and communications in Medicine (DICOM) format image data are available to nonaffiliated external healthcare facilities or entities on a secure, media free, reciprocally searchable basis with patient authorization for at least 12 months after this study. _______________ FINDINGS: EXAM QUALITY: Non-diagnostic/Adequate/Excellent PULMONARY ARTERIES: No evidence of pulmonary embolism. MEDIASTINUM: Normal heart size. No evidence of right heart strain. Aorta is unremarkable. No pericardial effusion. LUNGS: There is bilateral bronchial thickening and lower lung field interstitial coarsening and faint lower lung field opacities. PLEURA: Normal. AIRWAY: Normal. LYMPH NODES: No enlarged nodes. UPPER ABDOMEN: There is bilateral adrenal gland thickening. OTHER: No acute or aggressive osseous abnormalities identified. _______________ IMPRESSION: 1. No evidence of pulmonary embolism. 2.  Bilateral bronchial thickening possibly related to bronchitis.  3. There is also bilateral lower lung field interstitial coarsening and faint lung opacities which may be related to hypoaeration. Edema or developing infiltrates considered less likely. Xr Chest [facility] Result Date: 10/4/2020 A portable AP radiograph of the chest was obtained at 0516 hours: INDICATION:  Pneumonia. COMPARISON:  10/1/2020. FINDINGS:  Heart and mediastinum: Unremarkable. Lungs and pleura: There has been significant decrease in the perihilar infiltrates since the prior study. Small residual patchy densities remain in the left base. Mild diffuse interstitial prominence persists. No definite pleural effusion. Aorta: Thoracic aorta is mildly tortuous and partially calcified. . Bones: Within normal limits for age. Other: None. Impression: Significant improvement since the prior study with decrease in the perihilar infiltrates particularly on the left side likely due to resolving edema. Patchy densities in the left base representing either atelectasis or minimal residual infiltrates. Mild underlying interstitial prominence could represent chronic fibrosis. Xr Chest [facility] Result Date: 10/1/2020 
--------------------------------------------------------------------------- <<<<<<<<<           [facility]           >>>>>>>>> --------------------------------------------------------------------------- CLINICAL HISTORY:  Shortness of breath. COMPARISON EXAMINATIONS:  September 28, 2020. ---  SINGLE FRONTAL VIEW OF THE CHEST  --- The cardiac mediastinal silhouette is stable. There is diffuse bilateral increased initial markings and diffuse bilateral infiltrates. No significant osseous abnormalities are identified.  -------------- Impression: -------------- Diffuse bilateral increased markings and diffuse bilateral infiltrates. Findings could represent interstitial and pulmonary edema. Atypical pneumonia could also have this appearance.  There has been significant interval progression compared to previous. Xr Chest [facility] Result Date: 9/28/2020 EXAM:  AP Portable Chest X-ray 1 view INDICATION: Shortness of breath COMPARISON: None _______________ FINDINGS:  Heart size appears enlarged partly due to AP magnification and mediastinal contours are within normal limits for portable radiograph. There are increased interstitial lung markings suggesting mild pulmonary edema with prominence of the central pulmonary vasculature. There are no pleural effusions. No acute osseous findings. ________________ IMPRESSION: Increased interstitial lung markings and prominence of the central pulmonary vasculature suggesting mild pulmonary edema. No results found for this or any previous visit. Please note that this dictation was completed with [employer], the computer voice recognition software. Quite often unanticipated grammatical, syntax, homophones, and other interpretive errors are inadvertently transcribed by the computer software. Please disregard these errors. Please excuse any errors that have escaped final proofreading.   
 
CC: Kiko, MD [place]

## 2020-10-09 NOTE — ROUTINE PROCESS
Bedside and Verbal shift change report given to WILMER Mcduffie RN by Kamila Trujillo RN. Report included the following information SBAR, Kardex, OR Summary, Intake/Output and MAR.

## 2020-10-13 PROBLEM — Z51.5 HOSPICE CARE: Status: ACTIVE | Noted: 2020-01-01

## 2022-12-06 NOTE — DIABETES MGMT
GLYCEMIC CONTROL PROGRESS NOTE: 
 
- chart reviewed, no known h/o DM 
- Solumedrol 40 mg Q 6 hours , steroid associated hyperglycemia - TDD = 4 Humalog Normal Insulin Sensitivity Corrective Coverage Recent Glucose Results:  
Lab Results Component Value Date/Time  (H) 10/01/2020 06:35 AM  
 GLUCPOC 207 (H) 10/01/2020 06:02 AM  
 GLUCPOC 138 (H) 09/30/2020 11:31 PM  
 GLUCPOC 160 (H) 09/30/2020 06:00 PM  
 
Michael Jones MS, RN, CDE Glycemic Control Team 
[phone] Pager [phone] (M-TH 8:00-4:30P) *After Hours pager [phone] Odomzo Counseling- I discussed with the patient the risks of Odomzo including but not limited to nausea, vomiting, diarrhea, constipation, weight loss, changes in the sense of taste, decreased appetite, muscle spasms, and hair loss.  The patient verbalized understanding of the proper use and possible adverse effects of Odomzo.  All of the patient's questions and concerns were addressed.